# Patient Record
Sex: FEMALE | Race: WHITE | NOT HISPANIC OR LATINO | Employment: OTHER | URBAN - METROPOLITAN AREA
[De-identification: names, ages, dates, MRNs, and addresses within clinical notes are randomized per-mention and may not be internally consistent; named-entity substitution may affect disease eponyms.]

---

## 2017-01-14 ENCOUNTER — MYC MEDICAL ADVICE (OUTPATIENT)
Dept: INTERNAL MEDICINE | Facility: CLINIC | Age: 77
End: 2017-01-14

## 2017-01-14 DIAGNOSIS — N18.30 TYPE 2 DIABETES MELLITUS WITH STAGE 3 CHRONIC KIDNEY DISEASE, WITHOUT LONG-TERM CURRENT USE OF INSULIN (H): Primary | ICD-10-CM

## 2017-01-14 DIAGNOSIS — E11.22 TYPE 2 DIABETES MELLITUS WITH STAGE 3 CHRONIC KIDNEY DISEASE, WITHOUT LONG-TERM CURRENT USE OF INSULIN (H): Primary | ICD-10-CM

## 2017-01-14 DIAGNOSIS — E78.5 HYPERLIPIDEMIA LDL GOAL <70: ICD-10-CM

## 2017-01-24 DIAGNOSIS — I10 ESSENTIAL HYPERTENSION: Primary | ICD-10-CM

## 2017-01-24 RX ORDER — DILTIAZEM HYDROCHLORIDE 360 MG/1
360 CAPSULE, EXTENDED RELEASE ORAL DAILY
Qty: 90 CAPSULE | Refills: 1 | Status: SHIPPED | OUTPATIENT
Start: 2017-01-24 | End: 2017-06-02

## 2017-01-24 NOTE — TELEPHONE ENCOUNTER
diltiazem (CARDIZEM CD) 360 MG 24 hr CD capsule         Last Written Prescription Date: 5/12/2016  Last Fill Quantity: 90, # refills: 3    Last Office Visit with [facility], [facility] or [facility] prescribing provider:  6/14/2016   Future Office Visit:      BP Readings from Last 3 Encounters:   06/14/16 130/68   05/12/16 110/62   03/11/16 140/78     ALT       19   7/26/2016  CHOL      196   7/26/2016  HDL       59   7/26/2016  LDL      105   7/26/2016  LDL      107   4/11/2013  TRIG      158   7/26/2016  CHOLHDLRATIO      4.0   8/3/2015

## 2017-02-20 ENCOUNTER — TELEPHONE (OUTPATIENT)
Dept: INTERNAL MEDICINE | Facility: CLINIC | Age: 77
End: 2017-02-20

## 2017-02-20 NOTE — TELEPHONE ENCOUNTER
Prior authorization    Medication name labetolol  Insurance [facility]  Insurance ID number [id number]  Prior authorization faxed through cover my meds

## 2017-04-27 DIAGNOSIS — J45.30 MILD PERSISTENT ASTHMA WITHOUT COMPLICATION: Primary | ICD-10-CM

## 2017-04-27 NOTE — TELEPHONE ENCOUNTER
Advair Diskus 500-50 mcg/dose diskus inhaler  Last Written Prescription Date: 08/10/2011  Last Fill Quantity: 60, # refills: 11    Last Office Visit with [facility], [facility] or [facility] prescribing provider:  06/14/2016   Future Office Visit:    Next 5 appointments (look out 90 days)     Jun 01, 2017  7:30 AM CDT   [facility] De Jesus [facility]   [facility]    [address] [place] [address] [phone]                   Date of Last Asthma Action Plan Letter:   Asthma Action Plan Q1 Year    Topic Date Due     Asthma Action Plan - yearly  08/10/2016      Asthma Control Test:   ACT Total Scores 5/12/2016   ACT TOTAL SCORE -   ASTHMA ER VISITS -   ASTHMA HOSPITALIZATIONS -   ACT TOTAL SCORE (Goal Greater than or Equal to 20) 18   In the past 12 months, how many times did you visit the emergency room for your asthma without being admitted to the hospital? 0   In the past 12 months, how many times were you hospitalized overnight because of your asthma? 0       Date of Last Spirometry Test:   No results found for this or any previous visit.

## 2017-04-27 NOTE — TELEPHONE ENCOUNTER
Reason for Call:  Medication or medication refill: med refill    Do you use a [facility]?  NO  Name of the pharmacy and phone number for the current request:  [facility] #[id number] [address] - [phone] Fax [phone]    Name of the medication requested:  ADVAIR DISKUS 500-50 MCG/DOSE IN MISC  (inhaler)    Other request: the insurance company doesn't cover the current inhaler, the patient would like to go back onto her old one / advair - please confirm the dosage    Can we leave a detailed message on this number? YES    Phone number patient can be reached at: Home number on file [phone] (home)    Best Time: anytime    Call taken on 4/27/2017 at 9:08 AM by Meredith Granger

## 2017-04-29 DIAGNOSIS — I10 ESSENTIAL HYPERTENSION: ICD-10-CM

## 2017-05-02 RX ORDER — LABETALOL 100 MG/1
TABLET, FILM COATED ORAL
Qty: 180 TABLET | Refills: 0 | Status: SHIPPED | OUTPATIENT
Start: 2017-05-02 | End: 2017-06-02

## 2017-05-02 NOTE — TELEPHONE ENCOUNTER
labetalol (NORMODYNE) 100 MG tablet      Last Written Prescription Date: 5/12/16  Last Fill Quantity: 180, # refills: 3    Last Office Visit with [facility], [facility] or [facility] prescribing provider:  6/14/16   Future Office Visit:    Next 5 appointments (look out 90 days)     Jun 01, 2017  7:30 AM CDT   Office Visit with Jose De Jesus Snow MD   [facility]    [address] [address] [phone]                    BP Readings from Last 3 Encounters:   06/14/16 130/68   05/12/16 110/62   03/11/16 140/78

## 2017-06-02 ENCOUNTER — OFFICE VISIT (OUTPATIENT)
Dept: INTERNAL MEDICINE | Facility: CLINIC | Age: 77
End: 2017-06-02
Payer: COMMERCIAL

## 2017-06-02 VITALS
TEMPERATURE: 98.2 F | SYSTOLIC BLOOD PRESSURE: 102 MMHG | OXYGEN SATURATION: 96 % | HEIGHT: 63 IN | DIASTOLIC BLOOD PRESSURE: 60 MMHG | WEIGHT: 141.7 LBS | BODY MASS INDEX: 25.11 KG/M2 | HEART RATE: 73 BPM

## 2017-06-02 DIAGNOSIS — K21.9 GASTROESOPHAGEAL REFLUX DISEASE WITHOUT ESOPHAGITIS: ICD-10-CM

## 2017-06-02 DIAGNOSIS — N18.30 CKD (CHRONIC KIDNEY DISEASE) STAGE 3, GFR 30-59 ML/MIN (H): ICD-10-CM

## 2017-06-02 DIAGNOSIS — E78.5 HYPERLIPIDEMIA LDL GOAL <70: ICD-10-CM

## 2017-06-02 DIAGNOSIS — I25.10 CORONARY ARTERY DISEASE INVOLVING NATIVE CORONARY ARTERY OF NATIVE HEART WITHOUT ANGINA PECTORIS: ICD-10-CM

## 2017-06-02 DIAGNOSIS — J45.30 MILD PERSISTENT ASTHMA WITHOUT COMPLICATION: ICD-10-CM

## 2017-06-02 DIAGNOSIS — I10 ESSENTIAL HYPERTENSION: ICD-10-CM

## 2017-06-02 DIAGNOSIS — E11.22 TYPE 2 DIABETES MELLITUS WITH STAGE 3 CHRONIC KIDNEY DISEASE, WITHOUT LONG-TERM CURRENT USE OF INSULIN (H): Primary | ICD-10-CM

## 2017-06-02 DIAGNOSIS — K63.5 COLON POLYPS: ICD-10-CM

## 2017-06-02 DIAGNOSIS — N18.30 TYPE 2 DIABETES MELLITUS WITH STAGE 3 CHRONIC KIDNEY DISEASE, WITHOUT LONG-TERM CURRENT USE OF INSULIN (H): Primary | ICD-10-CM

## 2017-06-02 LAB
ALBUMIN SERPL-MCNC: 3.8 G/DL (ref 3.4–5)
ALP SERPL-CCNC: 86 U/L (ref 40–150)
ALT SERPL W P-5'-P-CCNC: 19 U/L (ref 0–50)
ANION GAP SERPL CALCULATED.3IONS-SCNC: 10 MMOL/L (ref 3–14)
AST SERPL W P-5'-P-CCNC: 13 U/L (ref 0–45)
BILIRUB SERPL-MCNC: 0.6 MG/DL (ref 0.2–1.3)
BUN SERPL-MCNC: 20 MG/DL (ref 7–30)
CALCIUM SERPL-MCNC: 8.8 MG/DL (ref 8.5–10.1)
CHLORIDE SERPL-SCNC: 110 MMOL/L (ref 94–109)
CHOLEST SERPL-MCNC: 195 MG/DL
CO2 SERPL-SCNC: 24 MMOL/L (ref 20–32)
CREAT SERPL-MCNC: 1.09 MG/DL (ref 0.52–1.04)
DEPRECATED CALCIDIOL+CALCIFEROL SERPL-MC: 21 UG/L (ref 20–75)
GFR SERPL CREATININE-BSD FRML MDRD: 49 ML/MIN/1.7M2
GLUCOSE SERPL-MCNC: 178 MG/DL (ref 70–99)
HBA1C MFR BLD: 6.7 % (ref 4.3–6)
HDLC SERPL-MCNC: 66 MG/DL
HGB BLD-MCNC: 13.8 G/DL (ref 11.7–15.7)
LDLC SERPL CALC-MCNC: 100 MG/DL
NONHDLC SERPL-MCNC: 129 MG/DL
POTASSIUM SERPL-SCNC: 4 MMOL/L (ref 3.4–5.3)
PROT SERPL-MCNC: 6.8 G/DL (ref 6.8–8.8)
SODIUM SERPL-SCNC: 144 MMOL/L (ref 133–144)
TRIGL SERPL-MCNC: 145 MG/DL

## 2017-06-02 PROCEDURE — 80053 COMPREHEN METABOLIC PANEL: CPT | Performed by: INTERNAL MEDICINE

## 2017-06-02 PROCEDURE — 99215 OFFICE O/P EST HI 40 MIN: CPT | Performed by: INTERNAL MEDICINE

## 2017-06-02 PROCEDURE — 36415 COLL VENOUS BLD VENIPUNCTURE: CPT | Performed by: INTERNAL MEDICINE

## 2017-06-02 PROCEDURE — 83036 HEMOGLOBIN GLYCOSYLATED A1C: CPT | Performed by: INTERNAL MEDICINE

## 2017-06-02 PROCEDURE — 85018 HEMOGLOBIN: CPT | Performed by: INTERNAL MEDICINE

## 2017-06-02 PROCEDURE — 99207 C FOOT EXAM  NO CHARGE: CPT | Performed by: INTERNAL MEDICINE

## 2017-06-02 PROCEDURE — 80061 LIPID PANEL: CPT | Performed by: INTERNAL MEDICINE

## 2017-06-02 PROCEDURE — 82306 VITAMIN D 25 HYDROXY: CPT | Performed by: INTERNAL MEDICINE

## 2017-06-02 RX ORDER — HYDRALAZINE HYDROCHLORIDE 50 MG/1
75 TABLET, FILM COATED ORAL 3 TIMES DAILY
Qty: 405 TABLET | Refills: 3 | Status: SHIPPED | OUTPATIENT
Start: 2017-06-02 | End: 2018-05-07

## 2017-06-02 RX ORDER — LABETALOL 100 MG/1
100 TABLET, FILM COATED ORAL 2 TIMES DAILY
Qty: 180 TABLET | Refills: 3 | Status: SHIPPED | OUTPATIENT
Start: 2017-06-02 | End: 2018-05-07

## 2017-06-02 RX ORDER — LOSARTAN POTASSIUM 100 MG/1
100 TABLET ORAL DAILY
Qty: 90 TABLET | Refills: 3 | Status: SHIPPED | OUTPATIENT
Start: 2017-06-02 | End: 2017-07-24

## 2017-06-02 RX ORDER — OMEPRAZOLE 40 MG/1
40 CAPSULE, DELAYED RELEASE ORAL DAILY
Qty: 90 CAPSULE | Refills: 3 | Status: SHIPPED | OUTPATIENT
Start: 2017-06-02 | End: 2018-05-07

## 2017-06-02 RX ORDER — ALBUTEROL SULFATE 90 UG/1
2 AEROSOL, METERED RESPIRATORY (INHALATION) EVERY 6 HOURS PRN
Qty: 1 INHALER | Refills: 11 | Status: SHIPPED | OUTPATIENT
Start: 2017-06-02 | End: 2018-05-07

## 2017-06-02 RX ORDER — DILTIAZEM HYDROCHLORIDE 360 MG/1
360 CAPSULE, EXTENDED RELEASE ORAL DAILY
Qty: 90 CAPSULE | Refills: 3 | Status: SHIPPED | OUTPATIENT
Start: 2017-06-02 | End: 2018-05-07

## 2017-06-02 RX ORDER — ROSUVASTATIN CALCIUM 40 MG/1
40 TABLET, COATED ORAL DAILY
Qty: 90 TABLET | Refills: 3 | Status: SHIPPED | OUTPATIENT
Start: 2017-06-02 | End: 2018-05-07

## 2017-06-02 NOTE — MR AVS SNAPSHOT
After Visit Summary   6/2/2017    Cristela Peace    MRN: [id number]           Patient Information     Date Of Birth          1940        Visit Information        Provider Department      6/2/2017 8:00 AM Jose De Jesus Snow MD [facility]        Today's Diagnoses     Type 2 diabetes mellitus with stage 3 chronic kidney disease, without long-term current use of insulin (H)    -  1    Essential hypertension        Mild persistent asthma without complication        Hyperlipidemia LDL goal <70        Gastroesophageal reflux disease without esophagitis        Colon polyps        Coronary artery disease involving native coronary artery of native heart without angina pectoris        CKD (chronic kidney disease) stage 3, GFR 30-59 ml/min          Care Instructions    Continue current meds  Prescriptions refilled.    Labs today as ordered  Colonoscopy  with  [facility]. They will call to schedule. If not heard from them in 1 week, then call [phone].   Eye appointment in November  Stress test heart - [facility] will call to schedule  Td vaccine (tetanus) this summer. Check with insurance if prefers to get in pharmacy or clinic                 Follow-ups after your visit        Additional Services     GASTROENTEROLOGY ADULT REF PROCEDURE ONLY                 Future tests that were ordered for you today     Open Future Orders        Priority Expected Expires Ordered    NM Exercise stress test Routine  6/2/2018 6/2/2017            Who to contact     If you have questions or need follow up information about today's clinic visit or your schedule please contact [facility] directly at [phone].  Normal or non-critical lab and imaging results will be communicated to you by MyChart, letter or phone within 4 business days after the clinic has received the results. If you do not hear from us within 7 days, please contact the clinic through [employer] or  "phone. If you have a critical or abnormal lab result, we will notify you by phone as soon as possible.  Submit refill requests through [employer] or call your pharmacy and they will forward the refill request to us. Please allow 3 business days for your refill to be completed.          Additional Information About Your Visit        Offsite Care Resourceshart Information     [employer] lets you send messages to your doctor, view your test results, renew your prescriptions, schedule appointments and more. To sign up, go to [email or web address] . Click on \"Log in\" on the left side of the screen, which will take you to the Welcome page. Then click on \"Sign up Now\" on the right side of the page.     You will be asked to enter the access code listed below, as well as some personal information. Please follow the directions to create your username and password.     Your access code is: [email or web address]  Expires: 2017  8:45 AM     Your access code will  in 90 days. If you need help or a new code, please call your [facility] clinic or [phone].        Care EveryWhere ID     This is your Care EveryWhere ID. This could be used by other organizations to access your [facility] medical records  [id number]        Your Vitals Were     Pulse Temperature Height Pulse Oximetry BMI (Body Mass Index)       73 98.2  F (36.8  C) (Oral) 5' 3\" (1.6 m) 96% 25.1 kg/m2        Blood Pressure from Last 3 Encounters:   17 102/60   16 130/68   16 110/62    Weight from Last 3 Encounters:   17 141 lb 11.2 oz (64.3 kg)   16 140 lb (63.5 kg)   16 142 lb (64.4 kg)              We Performed the Following     Albumin Random Urine Quantitative     Comprehensive metabolic panel     FOOT EXAM     GASTROENTEROLOGY ADULT REF PROCEDURE ONLY     Hemoglobin A1c     Hemoglobin     Lipid panel reflex to direct LDL     Vitamin D Deficiency          Today's Medication Changes          These changes are accurate as of: 17  8:45 AM.  If you " have any questions, ask your nurse or doctor.               Start taking these medicines.        Dose/Directions    albuterol 108 (90 BASE) MCG/ACT Inhaler   Commonly known as:  PROAIR HFA/PROVENTIL HFA/VENTOLIN HFA   Used for:  Mild persistent asthma without complication   Started by:  Jose De Jesus Snow MD        Dose:  2 puff   Inhale 2 puffs into the lungs every 6 hours as needed for shortness of breath / dyspnea or wheezing   Quantity:  1 Inhaler   Refills:  11         These medicines have changed or have updated prescriptions.        Dose/Directions    labetalol 100 MG tablet   Commonly known as:  NORMODYNE   This may have changed:  See the new instructions.   Used for:  Essential hypertension   Changed by:  Jose De Jesus Snow MD        Dose:  100 mg   Take 1 tablet (100 mg) by mouth 2 times daily   Quantity:  180 tablet   Refills:  3         Stop taking these medicines if you haven't already. Please contact your care team if you have questions.     mometasone-formoterol 200-5 MCG/ACT oral inhaler   Commonly known as:  DULERA   Stopped by:  Jose De Jesus Snow MD                Where to get your medicines      These medications were sent to [facility] [id number] [place] [facility] [address]  [address] [address]     Phone:  [phone]     albuterol 108 (90 BASE) MCG/ACT Inhaler    diltiazem 360 MG 24 hr CD capsule    fluticasone-salmeterol 500-50 MCG/DOSE diskus inhaler    hydrALAZINE 50 MG tablet    labetalol 100 MG tablet    losartan 100 MG tablet    metFORMIN 1000 MG tablet    omeprazole 40 MG capsule    rosuvastatin 40 MG tablet                Primary Care Provider Office Phone # Fax #    Jose De Jesus Snow [phone]       [facility] [address]  [facility] [address]        Thank you!     Thank you for choosing [facility]  for your care. Our goal is always to provide you with excellent care. Hearing back from our patients is one way we can continue  to improve our services. Please take a few minutes to complete the written survey that you may receive in the mail after your visit with us. Thank you!             Your Updated Medication List - Protect others around you: Learn how to safely use, store and throw away your medicines at www.disposemymeds.org.          This list is accurate as of: 6/2/17  8:45 AM.  Always use your most recent med list.                   Brand Name Dispense Instructions for use    albuterol 108 (90 BASE) MCG/ACT Inhaler    PROAIR HFA/PROVENTIL HFA/VENTOLIN HFA    1 Inhaler    Inhale 2 puffs into the lungs every 6 hours as needed for shortness of breath / dyspnea or wheezing       aspirin 81 MG tablet      Take 1 tablet by mouth daily.       cholecalciferol 2000 UNITS tablet      Take 1 tablet by mouth daily       diltiazem 360 MG 24 hr CD capsule    CARDIZEM CD    90 capsule    Take 1 capsule (360 mg) by mouth daily       fluticasone-salmeterol 500-50 MCG/DOSE diskus inhaler    ADVAIR DISKUS    3 Inhaler    Inhale 1 puff into the lungs every 12 hours       hydrALAZINE 50 MG tablet    APRESOLINE    405 tablet    Take 1.5 tablets (75 mg) by mouth 3 times daily       labetalol 100 MG tablet    NORMODYNE    180 tablet    Take 1 tablet (100 mg) by mouth 2 times daily       losartan 100 MG tablet    COZAAR    90 tablet    Take 1 tablet (100 mg) by mouth daily       metFORMIN 1000 MG tablet    GLUCOPHAGE    180 tablet    Take 1 tablet (1,000 mg) by mouth 2 times daily (with meals)       omeprazole 40 MG capsule    priLOSEC    90 capsule    Take 1 capsule (40 mg) by mouth daily       order for DME     1 Device    Equipment being ordered: nebulizer with tubing       potassium chloride SA 20 MEQ CR tablet    potassium chloride    90 tablet    Take 1 tablet by mouth daily. INDICATION:POTASSIUM SUPPLEMENTATION       rosuvastatin 40 MG tablet    CRESTOR    90 tablet    Take 1 tablet (40 mg) by mouth daily

## 2017-06-02 NOTE — PROGRESS NOTES
SUBJECTIVE:                                                    Cristela Peace is a 76 year old female who presents to clinic today for the following health issues:      Hypertension Follow-up      Outpatient blood pressures are not being checked.    Low Salt Diet: no added salt       Amount of exercise or physical activity: 6-7 days/week for an average of 15-30 minutes    Problems taking medications regularly: No    Medication side effects: none    Diet: regular (no restrictions)    Pt's past medical history, family history, habits, medications and allergies were reviewed with the patient today.  See snap shot for  HCM status. Most recent lab results reviewed with pt. Problem list and histories reviewed & adjusted, as indicated.  Additional history as below:    Also f/u re: diabetes mellitus and lipids, CKD,  Asthma, CAD. Has not been checking sugars at home recently. Last saw cardiology feb 2015. Suggested then that pt have repeat stress test in 2 years.  ACT = 20.  On Advair BID and  Does not have Albuterol at home and requesting. Denies CP, current SOB, abdominal pain, polyuria, polydipsia,  extremity numbness/parasthesias or skin problems. Eye exam Nov 2016.   Has cataract right eye and may need future surgery. Due for colonoscopy with hx polyps. Last 2009.  Using treadmill for 15-20 min 3x/week. ACT = 20        Lab Results   Component Value Date     07/26/2016     Lab Results   Component Value Date    A1C 6.6 07/26/2016     Lab Results   Component Value Date    CHOL 196 07/26/2016     Lab Results   Component Value Date     07/26/2016     Lab Results   Component Value Date    HDL 59 07/26/2016     Lab Results   Component Value Date    TRIG 158 07/26/2016     Lab Results   Component Value Date    CR 1.02 07/26/2016     Lab Results   Component Value Date    ALT 19 07/26/2016     Lab Results   Component Value Date    AST 9 07/26/2016     Lab Results   Component Value Date    MICROL 18 05/12/2016  "    Lab Results   Component Value Date    TSH 3.59 05/12/2016          Additional ROS:   Constitutional, HEENT, Cardiovascular, Pulmonary, GI and , Neuro, MSK and Psych review of systems/symptoms are otherwise negative or unchanged from previous, except as noted above.      OBJECTIVE:  /60 (BP Location: Left arm, Patient Position: Chair, Cuff Size: Adult Regular)  Pulse 73  Temp 98.2  F (36.8  C) (Oral)  Ht 5' 3\" (1.6 m)  Wt 141 lb 11.2 oz (64.3 kg)  SpO2 96%  BMI 25.1 kg/m2   Estimated body mass index is 25.1 kg/(m^2) as calculated from the following:    Height as of this encounter: 5' 3\" (1.6 m).    Weight as of this encounter: 141 lb 11.2 oz (64.3 kg).  Eye: PERRL, EOMI  HENT: ear canals and TM's normal and nose and mouth without ulcers or lesions   Neck: no adenopathy. Thyroid normal to palpation. No bruits  Pulm: Lungs clear to auscultation   CV: Regular rates and rhythm. 1/6 JANSE RUSB (hx sclerosis)  GI: Soft, nontender, Normal active bowel sounds, No hepatosplenomegaly or masses palpable  Ext: Peripheral pulses intact. No edema. Bilateral nontender 1st MTP bunions  Neuro: Normal strength and tone, sensory exam grossly normal      Assessment/Plan: (See plan discussion below for further details)  1. Type 2 diabetes mellitus with stage 3 chronic kidney disease, without long-term current use of insulin (H)   Controlled previously. Not checking sugars recently  - metFORMIN (GLUCOPHAGE) 1000 MG tablet; Take 1 tablet (1,000 mg) by mouth 2 times daily (with meals)  Dispense: 180 tablet; Refill: 3  - FOOT EXAM  - Hemoglobin A1c  - Comprehensive metabolic panel  - Lipid panel reflex to direct LDL  - Albumin Random Urine Quantitative    2. Essential hypertension  controlled  - labetalol (NORMODYNE) 100 MG tablet; Take 1 tablet (100 mg) by mouth 2 times daily  Dispense: 180 tablet; Refill: 3  - diltiazem (CARDIZEM CD) 360 MG 24 hr CD capsule; Take 1 capsule (360 mg) by mouth daily  Dispense: 90 capsule; " Refill: 3  - losartan (COZAAR) 100 MG tablet; Take 1 tablet (100 mg) by mouth daily  Dispense: 90 tablet; Refill: 3  - hydrALAZINE (APRESOLINE) 50 MG tablet; Take 1.5 tablets (75 mg) by mouth 3 times daily  Dispense: 405 tablet; Refill: 3    3. Mild persistent asthma without complication  Controlled. Needs Albuterol to use prn  - fluticasone-salmeterol (ADVAIR DISKUS) 500-50 MCG/DOSE diskus inhaler; Inhale 1 puff into the lungs every 12 hours  Dispense: 3 Inhaler; Refill: 3  - albuterol (PROAIR HFA/PROVENTIL HFA/VENTOLIN HFA) 108 (90 BASE) MCG/ACT Inhaler; Inhale 2 puffs into the lungs every 6 hours as needed for shortness of breath / dyspnea or wheezing  Dispense: 1 Inhaler; Refill: 11    4. Hyperlipidemia LDL goal <70  Continue meds. Labs as ordered  - rosuvastatin (CRESTOR) 40 MG tablet; Take 1 tablet (40 mg) by mouth daily  Dispense: 90 tablet; Refill: 3  - Comprehensive metabolic panel  - Lipid panel reflex to direct LDL    5. Gastroesophageal reflux disease without esophagitis  Controlled.  Needs longterm with hiatal hernia  - omeprazole (PRILOSEC) 40 MG capsule; Take 1 capsule (40 mg) by mouth daily  Dispense: 90 capsule; Refill: 3    6. Colon polyps  Due for f/u colonoscopy with hx polyps  - GASTROENTEROLOGY ADULT REF PROCEDURE ONLY    7. Coronary artery disease involving native coronary artery of native heart without angina pectoris  No recent CP. Needs stress test per recs of cardiology  - NM Exercise stress test; Future    8. CKD (chronic kidney disease) stage 3, GFR 30-59 ml/min  Labs as ordered. HAs been stable previously. Not using NSAIDS  - Comprehensive metabolic panel  - Lipid panel reflex to direct LDL  - Albumin Random Urine Quantitative  - Vitamin D Deficiency  - Hemoglobin    Plan discussion:  Continue current meds  Prescriptions refilled.    Labs today as ordered  Colonoscopy  with  [facility]. They will call to schedule. If not heard from them in 1 week, then call [phone].    Eye appointment in November  Stress test heart - [facility] will call to schedule  Td vaccine (tetanus) this summer. Check with insurance if prefers to get in pharmacy or clinic       Jose De Jesus Snow MD  Internal Medicine Department  [facility]

## 2017-06-02 NOTE — NURSING NOTE
"Chief Complaint   Patient presents with     Hypertension     f/up      Fatigue     x2 months        Initial /60 (BP Location: Left arm, Patient Position: Chair, Cuff Size: Adult Regular)  Pulse 73  Temp 98.2  F (36.8  C) (Oral)  Ht 5' 3\" (1.6 m)  Wt 141 lb 11.2 oz (64.3 kg)  SpO2 96%  BMI 25.1 kg/m2 Estimated body mass index is 25.1 kg/(m^2) as calculated from the following:    Height as of this encounter: 5' 3\" (1.6 m).    Weight as of this encounter: 141 lb 11.2 oz (64.3 kg).  Medication Reconciliation: complete    "

## 2017-06-02 NOTE — PATIENT INSTRUCTIONS
Continue current meds  Prescriptions refilled.    Labs today as ordered  Colonoscopy  with  [facility]. They will call to schedule. If not heard from them in 1 week, then call [phone].   Eye appointment in November  Stress test heart - [facility] will call to schedule  Td vaccine (tetanus) this summer. Check with insurance if prefers to get in pharmacy or clinic

## 2017-06-03 ASSESSMENT — ASTHMA QUESTIONNAIRES: ACT_TOTALSCORE: 20

## 2017-06-10 DIAGNOSIS — E78.5 HYPERLIPIDEMIA LDL GOAL <70: ICD-10-CM

## 2017-06-10 DIAGNOSIS — E11.22 TYPE 2 DIABETES MELLITUS WITH STAGE 3 CHRONIC KIDNEY DISEASE, WITHOUT LONG-TERM CURRENT USE OF INSULIN (H): ICD-10-CM

## 2017-06-10 DIAGNOSIS — N18.30 TYPE 2 DIABETES MELLITUS WITH STAGE 3 CHRONIC KIDNEY DISEASE, WITHOUT LONG-TERM CURRENT USE OF INSULIN (H): ICD-10-CM

## 2017-06-10 DIAGNOSIS — N18.30 CKD (CHRONIC KIDNEY DISEASE) STAGE 3, GFR 30-59 ML/MIN (H): Primary | ICD-10-CM

## 2017-06-13 ENCOUNTER — HOSPITAL ENCOUNTER (OUTPATIENT)
Age: 77
End: 2017-06-13
Attending: INTERNAL MEDICINE | Admitting: INTERNAL MEDICINE
Payer: MEDICARE

## 2017-06-24 ENCOUNTER — HEALTH MAINTENANCE LETTER (OUTPATIENT)
Age: 77
End: 2017-06-24

## 2017-06-28 ENCOUNTER — TELEPHONE (OUTPATIENT)
Dept: INTERNAL MEDICINE | Facility: CLINIC | Age: 77
End: 2017-06-28

## 2017-06-28 NOTE — TELEPHONE ENCOUNTER
Hold DIltiazem, Metformin and Labetolol that AM of the test. OK to take other meds.  Avoid caffeine for 24 hrs before the test and would not eat for 4 hours prior to the test

## 2017-07-10 ENCOUNTER — HOSPITAL ENCOUNTER (OUTPATIENT)
Dept: NUCLEAR MEDICINE | Facility: CLINIC | Age: 77
Setting detail: NUCLEAR MEDICINE
End: 2017-07-10
Attending: INTERNAL MEDICINE
Payer: MEDICARE

## 2017-07-10 ENCOUNTER — HOSPITAL ENCOUNTER (OUTPATIENT)
Dept: NUCLEAR MEDICINE | Facility: CLINIC | Age: 77
Setting detail: NUCLEAR MEDICINE
Discharge: HOME | End: 2017-07-10
Attending: INTERNAL MEDICINE | Admitting: INTERNAL MEDICINE
Payer: MEDICARE

## 2017-07-10 ENCOUNTER — HOSPITAL ENCOUNTER (OUTPATIENT)
Dept: CARDIOLOGY | Facility: CLINIC | Age: 77
End: 2017-07-10
Attending: INTERNAL MEDICINE
Payer: MEDICARE

## 2017-07-10 DIAGNOSIS — I25.10 CORONARY ARTERY DISEASE INVOLVING NATIVE CORONARY ARTERY OF NATIVE HEART WITHOUT ANGINA PECTORIS: ICD-10-CM

## 2017-07-10 PROCEDURE — 93018 CV STRESS TEST I&R ONLY: CPT | Performed by: INTERNAL MEDICINE

## 2017-07-10 PROCEDURE — 78452 HT MUSCLE IMAGE SPECT MULT: CPT

## 2017-07-10 PROCEDURE — A9502 TC99M TETROFOSMIN: HCPCS | Performed by: INTERNAL MEDICINE

## 2017-07-10 PROCEDURE — 93016 CV STRESS TEST SUPVJ ONLY: CPT | Performed by: INTERNAL MEDICINE

## 2017-07-10 PROCEDURE — 93017 CV STRESS TEST TRACING ONLY: CPT

## 2017-07-10 PROCEDURE — 34300033 ZZH RX 343: Performed by: INTERNAL MEDICINE

## 2017-07-10 PROCEDURE — 78452 HT MUSCLE IMAGE SPECT MULT: CPT | Mod: 26 | Performed by: INTERNAL MEDICINE

## 2017-07-10 RX ADMIN — TETROFOSMIN 30.5 MCI.: 1.38 INJECTION, POWDER, LYOPHILIZED, FOR SOLUTION INTRAVENOUS at 09:36

## 2017-07-10 RX ADMIN — TETROFOSMIN 9.6 MCI.: 1.38 INJECTION, POWDER, LYOPHILIZED, FOR SOLUTION INTRAVENOUS at 08:10

## 2017-07-11 DIAGNOSIS — E78.5 HYPERLIPIDEMIA LDL GOAL <70: ICD-10-CM

## 2017-07-11 RX ORDER — ROSUVASTATIN CALCIUM 40 MG/1
TABLET, COATED ORAL
Qty: 30 TABLET | Refills: 9 | OUTPATIENT
Start: 2017-07-11

## 2017-07-24 DIAGNOSIS — I10 ESSENTIAL HYPERTENSION: ICD-10-CM

## 2017-07-25 ENCOUNTER — TRANSFERRED RECORDS (OUTPATIENT)
Dept: HEALTH INFORMATION MANAGEMENT | Facility: CLINIC | Age: 77
End: 2017-07-25

## 2017-07-25 RX ORDER — LOSARTAN POTASSIUM 100 MG/1
TABLET ORAL
Qty: 90 TABLET | Refills: 3 | Status: SHIPPED | OUTPATIENT
Start: 2017-07-25 | End: 2018-05-07

## 2017-08-19 ENCOUNTER — HEALTH MAINTENANCE LETTER (OUTPATIENT)
Age: 77
End: 2017-08-19

## 2018-02-27 DIAGNOSIS — J45.30 MILD PERSISTENT ASTHMA WITHOUT COMPLICATION: ICD-10-CM

## 2018-02-27 NOTE — TELEPHONE ENCOUNTER
ACT Total Scores 8/10/2015 5/12/2016 6/2/2017   ACT TOTAL SCORE 22 - -   ASTHMA ER VISITS 0 = None - -   ASTHMA HOSPITALIZATIONS 0 = None - -   ACT TOTAL SCORE (Goal Greater than or Equal to 20) - 18 20   In the past 12 months, how many times did you visit the emergency room for your asthma without being admitted to the hospital? - 0 0   In the past 12 months, how many times were you hospitalized overnight because of your asthma? - 0 0     Medication is being filled for 1 time refill only due to:  due for office visit

## 2018-02-27 NOTE — TELEPHONE ENCOUNTER
"Requested Prescriptions   Pending Prescriptions Disp Refills     fluticasone-salmeterol (ADVAIR DISKUS) 500-50 MCG/DOSE diskus inhaler  Last Written Prescription Date:  6/2/17  Last Fill Quantity: 3,  # refills: 3   Last office visit: 6/2/2017 with prescribing provider:  6/27/17   Future Office Visit:   Next 5 appointments (look out 90 days)     Apr 03, 2018  8:00 AM CDT   [facility]    [address] [address] [phone]                3 Inhaler 3     Sig: Inhale 1 puff into the lungs every 12 hours    Inhaled Steroids Protocol Failed    2/27/2018  9:09 AM       Failed - Asthma control test 20 or greater in last 6 months    Please review ACT score.          Failed - Recent (6 mo) or future visit with authorizing provider's specialty    Patient had office visit in the last 6 months or has a visit in the next 30 days with authorizing provider.  See \"Patient Info\" tab in inbasket, or \"Choose Columns\" in Meds & Orders section of the refill encounter.           Passed - Patient is age 12 or older        "

## 2018-02-27 NOTE — LETTER
[facility]  [address] [id number] [phone]            Cristela Salgado  [address] [address] [facility] [address]        February 27, 2018    Dear Cristela,    While refilling your prescription today, we noticed that you are due for an appointment with your provider.  We will refill your prescription for 30 days, but a follow-up appointment must be made before any additional refills can be approved.     Taking care of your health is important to us and we look forward to seeing you in the near future.  Please call us at [phone] or [phone] (or use [employer]) to schedule an appointment.     Please disregard this notice if you have already made an appointment.    Sincerely,        [facility]

## 2018-03-27 DIAGNOSIS — J45.30 MILD PERSISTENT ASTHMA WITHOUT COMPLICATION: ICD-10-CM

## 2018-03-27 NOTE — TELEPHONE ENCOUNTER
"Requested Prescriptions   Pending Prescriptions Disp Refills     ADVAIR DISKUS 500-50 MCG/DOSE diskus inhaler [Pharmacy Med Name: ADVAIR 500-50 DISKUS]  0     Sig: INHALE 1 PUFF INTO THE LUNGS EVERY 12 HOURS    Inhaled Steroids Protocol Failed    3/27/2018  1:47 AM       Failed - Asthma control assessment score within normal limits in last 6 months    Please review ACT score.          Passed - Patient is age 12 or older       Passed - Recent (6 mo) or future (30 days) visit within the authorizing provider's specialty    Patient had office visit in the last 6 months or has a visit in the next 30 days with authorizing provider or within the authorizing provider's specialty.  See \"Patient Info\" tab in inbasket, or \"Choose Columns\" in Meds & Orders section of the refill encounter.            ACT Total Scores 8/10/2015 5/12/2016 6/2/2017   ACT TOTAL SCORE 22 - -   ASTHMA ER VISITS 0 = None - -   ASTHMA HOSPITALIZATIONS 0 = None - -   ACT TOTAL SCORE (Goal Greater than or Equal to 20) - 18 20   In the past 12 months, how many times did you visit the emergency room for your asthma without being admitted to the hospital? - 0 0   In the past 12 months, how many times were you hospitalized overnight because of your asthma? - 0 0     Prescription [facility] per [facility] to the [facility] Refill Protocol.  Upcoming appt  "

## 2018-04-03 ENCOUNTER — OFFICE VISIT (OUTPATIENT)
Dept: INTERNAL MEDICINE | Facility: CLINIC | Age: 78
End: 2018-04-03
Payer: COMMERCIAL

## 2018-04-03 VITALS
RESPIRATION RATE: 16 BRPM | BODY MASS INDEX: 25.68 KG/M2 | WEIGHT: 144.9 LBS | DIASTOLIC BLOOD PRESSURE: 90 MMHG | SYSTOLIC BLOOD PRESSURE: 154 MMHG | HEIGHT: 63 IN | HEART RATE: 82 BPM | TEMPERATURE: 98.3 F

## 2018-04-03 DIAGNOSIS — J45.30 MILD PERSISTENT ASTHMA WITHOUT COMPLICATION: ICD-10-CM

## 2018-04-03 DIAGNOSIS — Z12.31 VISIT FOR SCREENING MAMMOGRAM: ICD-10-CM

## 2018-04-03 DIAGNOSIS — N18.30 TYPE 2 DIABETES MELLITUS WITH STAGE 3 CHRONIC KIDNEY DISEASE, WITHOUT LONG-TERM CURRENT USE OF INSULIN (H): ICD-10-CM

## 2018-04-03 DIAGNOSIS — E11.22 TYPE 2 DIABETES MELLITUS WITH STAGE 3 CHRONIC KIDNEY DISEASE, WITHOUT LONG-TERM CURRENT USE OF INSULIN (H): ICD-10-CM

## 2018-04-03 DIAGNOSIS — N91.2 ABSENCE OF MENSTRUATION: ICD-10-CM

## 2018-04-03 DIAGNOSIS — I10 ESSENTIAL HYPERTENSION: ICD-10-CM

## 2018-04-03 DIAGNOSIS — Z23 NEED FOR PROPHYLACTIC VACCINATION WITH TETANUS-DIPHTHERIA (TD): ICD-10-CM

## 2018-04-03 PROCEDURE — 90471 IMMUNIZATION ADMIN: CPT | Performed by: INTERNAL MEDICINE

## 2018-04-03 PROCEDURE — 90714 TD VACC NO PRESV 7 YRS+ IM: CPT | Performed by: INTERNAL MEDICINE

## 2018-04-03 PROCEDURE — 99214 OFFICE O/P EST MOD 30 MIN: CPT | Mod: 25 | Performed by: INTERNAL MEDICINE

## 2018-04-03 NOTE — PROGRESS NOTES
SUBJECTIVE:   Cristela Salgado is a 77 year old female who presents to clinic today for the following health issues:      Diabetes Follow-up      Patient is checking blood sugars: not at all    Diabetic concerns: None     Symptoms of hypoglycemia (low blood sugar): none     Paresthesias (numbness or burning in feet) or sores: No     Date of last diabetic eye exam: 6/2/17    BP Readings from Last 2 Encounters:   06/02/17 102/60   06/14/16 130/68     Hemoglobin A1C (%)   Date Value   06/02/2017 6.7 (H)   07/26/2016 6.6 (H)     LDL Cholesterol Calculated (mg/dL)   Date Value   06/02/2017 100 (H)   07/26/2016 105 (H)     Hypertension Follow-up      Outpatient blood pressures are being checked at home.  Results are 110-130/70-90.    Low Salt Diet: no added salt      Amount of exercise or physical activity: None    Problems taking medications regularly: No    Medication side effects: none    Diet: regular (no restrictions)    Pt's past medical history, family history, habits, medications and allergies were reviewed with the patient today.  See snap shot for  HCM status. Most recent lab results reviewed with pt. Problem list and histories reviewed & adjusted, as indicated.  Additional history as below:    Denies CP,  abdominal pain, polyuria, polydipsia, vision changes, extremity numbness/parasthesias or skin problems. ACT = 20. Occ use of Albuterol.  Blood pressure elevated today.  Had been well controlled at last visit 10 months ago.  Weight is up 3 pounds since then.  Patient states she is compliant with taking medications though did not take her blood pressure medication this morning.  Has not been checking blood sugars at all.  Diabetes previously well controlled.  Following diabetic diet.    Lab Results   Component Value Date     06/02/2017     Lab Results   Component Value Date    A1C 6.7 06/02/2017     Lab Results   Component Value Date    CHOL 195 06/02/2017     Lab Results   Component Value Date    LDL  "100 06/02/2017     Lab Results   Component Value Date    HDL 66 06/02/2017     Lab Results   Component Value Date    TRIG 145 06/02/2017     Lab Results   Component Value Date    CR 1.09 06/02/2017     Lab Results   Component Value Date    ALT 19 06/02/2017     Lab Results   Component Value Date    AST 13 06/02/2017     Lab Results   Component Value Date    MICROL 18 05/12/2016     Lab Results   Component Value Date    TSH 3.59 05/12/2016        Additional ROS:   Constitutional, HEENT, Cardiovascular, Pulmonary, GI and , Neuro, MSK and Psych review of systems/symptoms are otherwise negative or unchanged from previous, except as noted above.      OBJECTIVE:  /90  Pulse 82  Temp 98.3  F (36.8  C) (Oral)  Resp 16  Ht 5' 3\" (1.6 m)  Wt 144 lb 14.4 oz (65.7 kg)  BMI 25.67 kg/m2   Estimated body mass index is 25.67 kg/(m^2) as calculated from the following:    Height as of this encounter: 5' 3\" (1.6 m).    Weight as of this encounter: 144 lb 14.4 oz (65.7 kg).  Eye: PERRL, EOMI  HENT: ear canals and TM's normal and nose and mouth without ulcers or lesions   Neck: no adenopathy. Thyroid normal to palpation. No bruits  Pulm: Lungs clear to auscultation   CV: Regular rates and rhythm  GI: Soft, nontender, Normal active bowel sounds, No hepatosplenomegaly or masses palpable  Ext: Peripheral pulses intact. No edema.  Neuro: Normal strength and tone, sensory exam grossly normal    Assessment/Plan: (See plan discussion below for further details)  1. Type 2 diabetes mellitus with stage 3 chronic kidney disease, without long-term current use of insulin (H)  Overdue for labs.  Previously controlled.  No recent blood sugars to review.  Await future labs as ordered in the next few weeks  - Comprehensive metabolic panel; Future    2. Essential hypertension  Currently uncontrolled.  Patient not monitoring blood pressures at home.  Did not take meds today.  Will recheck in 1 month and adjust medications as necessary to " goal blood pressure less than 130/80    3. Visit for screening mammogram  Due for mammogram screening  - MA SCREENING DIGITAL BILAT - Future  (s+30); Future    4. Need for prophylactic vaccination with tetanus-diphtheria (TD)  - TD PRESERV FREE >=7 YRS ADS IM    5. Absence of menstruation  Due for bone density screening.  Last normal in 2010  - DX Hip/Pelvis/Spine; Future    6. Mild persistent asthma without complication  Continue current medication. ACT at goal.     Plan discussion:   TD vaccine  Fasting labs in the next 2-4 weeks. For fasting labs, please refrain from eating for 8 hours or more.  Be sure to  drink water and take your  medications the day of the test.   See me in 1 month for follow-up of sugars and blood pressure. Check blood sugars twice a day (before breakfast and bedtime) for 4 days prior to the appointment with me and bring the results to the appointment.  Mammogram and Bone density test - [facility]  Continue current meds       Jose De Jesus Snow MD  Internal Medicine Department  [facility]

## 2018-04-03 NOTE — MR AVS SNAPSHOT
After Visit Summary   4/3/2018    Cristela Salgado    MRN: [id number]           Patient Information     Date Of Birth          1940        Visit Information        Provider Department      4/3/2018 8:00 AM Jose De Jesus Snow MD [facility]        Today's Diagnoses     Absence of menstruation    -  1    Visit for screening mammogram        Need for prophylactic vaccination with tetanus-diphtheria (TD)        Type 2 diabetes mellitus with stage 3 chronic kidney disease, without long-term current use of insulin (H)          Care Instructions     TD vaccine  Fasting labs in the next 2-4 weeks. For fasting labs, please refrain from eating for 8 hours or more.  Be sure to  drink water and take your  medications the day of the test.   See me in 1 month for follow-up of sugars and blood pressure. Check blood sugars twice a day (before breakfast and bedtime) for 4 days prior to the appointment with me and bring the results to the appointment.  Mammogram and Bone density test - [facility]  Continue current meds          Follow-ups after your visit        Future tests that were ordered for you today     Open Future Orders        Priority Expected Expires Ordered    Comprehensive metabolic panel Routine 4/10/2018 4/3/2019 4/3/2018    DX Hip/Pelvis/Spine Routine  4/3/2019 4/3/2018    MA SCREENING DIGITAL BILAT - Future  (s+30) Routine  4/3/2019 4/3/2018            Who to contact     If you have questions or need follow up information about today's clinic visit or your schedule please contact [facility] directly at [phone].  Normal or non-critical lab and imaging results will be communicated to you by MyChart, letter or phone within 4 business days after the clinic has received the results. If you do not hear from us within 7 days, please contact the clinic through MyChart or phone. If you have a critical or abnormal lab result, we will notify you by phone as soon  "as possible.  Submit refill requests through [employer] or call your pharmacy and they will forward the refill request to us. Please allow 3 business days for your refill to be completed.          Additional Information About Your Visit        [employer] Information     [employer] lets you send messages to your doctor, view your test results, renew your prescriptions, schedule appointments and more. To sign up, go to [email or web address] . Click on \"Log in\" on the left side of the screen, which will take you to the Welcome page. Then click on \"Sign up Now\" on the right side of the page.     You will be asked to enter the access code listed below, as well as some personal information. Please follow the directions to create your username and password.     Your access code is: [email or web address]  Expires: 2018  8:38 AM     Your access code will  in 90 days. If you need help or a new code, please call your [place] clinic or [phone].        Care EveryWhere ID     This is your Care EveryWhere ID. This could be used by other organizations to access your [place] medical records  [id number]        Your Vitals Were     Pulse Temperature Respirations Height BMI (Body Mass Index)       82 98.3  F (36.8  C) (Oral) 16 5' 3\" (1.6 m) 25.67 kg/m2        Blood Pressure from Last 3 Encounters:   18 154/90   17 102/60   16 130/68    Weight from Last 3 Encounters:   18 144 lb 14.4 oz (65.7 kg)   17 141 lb 11.2 oz (64.3 kg)   16 140 lb (63.5 kg)                 Today's Medication Changes          These changes are accurate as of 4/3/18  8:38 AM.  If you have any questions, ask your nurse or doctor.               Stop taking these medicines if you haven't already. Please contact your care team if you have questions.     order for DME                    Primary Care Provider Office Phone # Fax #    Jose De Jesus Snow [phone]       [address] [place] [id number]        Equal " Access to Services     [facility] : Hadii duy sher alexa Garcia, waaxda luqadaha, qaybta kaalmagaby prietoamymando de leon. So [facility] [phone].    ATENCIÓN: Si habla español, tiene a castro disposición servicios gratuitos de asistencia lingüística. Llame al [phone].    We comply with applicable federal civil rights laws and [place] laws. We do not discriminate on the basis of race, color, national origin, age, disability, sex, sexual orientation, or gender identity.            Thank you!     Thank you for choosing [facility]  for your care. Our goal is always to provide you with excellent care. Hearing back from our patients is one way we can continue to improve our services. Please take a few minutes to complete the written survey that you may receive in the mail after your visit with us. Thank you!             Your Updated Medication List - Protect others around you: Learn how to safely use, store and throw away your medicines at www.disposemymeds.org.          This list is accurate as of 4/3/18  8:38 AM.  Always use your most recent med list.                   Brand Name Dispense Instructions for use Diagnosis    ADVAIR DISKUS 500-50 MCG/DOSE diskus inhaler   Generic drug:  fluticasone-salmeterol     1 Inhaler    INHALE 1 PUFF INTO THE LUNGS EVERY 12 HOURS    Mild persistent asthma without complication       albuterol 108 (90 BASE) MCG/ACT Inhaler    PROAIR HFA/PROVENTIL HFA/VENTOLIN HFA    1 Inhaler    Inhale 2 puffs into the lungs every 6 hours as needed for shortness of breath / dyspnea or wheezing    Mild persistent asthma without complication       aspirin 81 MG tablet      Take 1 tablet by mouth daily.        cholecalciferol 2000 UNITS tablet      Take 1 tablet by mouth daily        diltiazem 360 MG 24 hr CD capsule    CARDIZEM CD    90 capsule    Take 1 capsule (360 mg) by mouth daily    Essential hypertension       hydrALAZINE 50 MG tablet     APRESOLINE    405 tablet    Take 1.5 tablets (75 mg) by mouth 3 times daily    Essential hypertension       labetalol 100 MG tablet    NORMODYNE    180 tablet    Take 1 tablet (100 mg) by mouth 2 times daily    Essential hypertension       losartan 100 MG tablet    COZAAR    90 tablet    TAKE ONE TABLET BY MOUTH ONE TIME DAILY    Essential hypertension       metFORMIN 1000 MG tablet    GLUCOPHAGE    180 tablet    Take 1 tablet (1,000 mg) by mouth 2 times daily (with meals)    Type 2 diabetes mellitus with stage 3 chronic kidney disease, without long-term current use of insulin (H)       omeprazole 40 MG capsule    priLOSEC    90 capsule    Take 1 capsule (40 mg) by mouth daily    Gastroesophageal reflux disease without esophagitis       potassium chloride SA 20 MEQ CR tablet    KLOR-CON    90 tablet    Take 1 tablet by mouth daily. INDICATION:POTASSIUM SUPPLEMENTATION    Hypokalemia       rosuvastatin 40 MG tablet    CRESTOR    90 tablet    Take 1 tablet (40 mg) by mouth daily    Hyperlipidemia LDL goal <70

## 2018-04-03 NOTE — PATIENT INSTRUCTIONS
TD vaccine  Fasting labs in the next 2-4 weeks. For fasting labs, please refrain from eating for 8 hours or more.  Be sure to  drink water and take your  medications the day of the test.   See me in 1 month for follow-up of sugars and blood pressure. Check blood sugars twice a day (before breakfast and bedtime) for 4 days prior to the appointment with me and bring the results to the appointment.  Mammogram and Bone density test - [place]  Continue current meds

## 2018-04-04 ASSESSMENT — ASTHMA QUESTIONNAIRES: ACT_TOTALSCORE: 20

## 2018-05-02 ENCOUNTER — RADIANT APPOINTMENT (OUTPATIENT)
Dept: MAMMOGRAPHY | Facility: CLINIC | Age: 78
End: 2018-05-02
Attending: INTERNAL MEDICINE
Payer: COMMERCIAL

## 2018-05-02 DIAGNOSIS — Z12.31 VISIT FOR SCREENING MAMMOGRAM: ICD-10-CM

## 2018-05-02 DIAGNOSIS — E11.22 TYPE 2 DIABETES MELLITUS WITH STAGE 3 CHRONIC KIDNEY DISEASE, WITHOUT LONG-TERM CURRENT USE OF INSULIN (H): ICD-10-CM

## 2018-05-02 DIAGNOSIS — E78.5 HYPERLIPIDEMIA LDL GOAL <70: ICD-10-CM

## 2018-05-02 DIAGNOSIS — N18.30 CKD (CHRONIC KIDNEY DISEASE) STAGE 3, GFR 30-59 ML/MIN (H): ICD-10-CM

## 2018-05-02 DIAGNOSIS — N18.30 TYPE 2 DIABETES MELLITUS WITH STAGE 3 CHRONIC KIDNEY DISEASE, WITHOUT LONG-TERM CURRENT USE OF INSULIN (H): ICD-10-CM

## 2018-05-02 LAB
ALBUMIN SERPL-MCNC: 3.6 G/DL (ref 3.4–5)
ALP SERPL-CCNC: 73 U/L (ref 40–150)
ALT SERPL W P-5'-P-CCNC: 22 U/L (ref 0–50)
ANION GAP SERPL CALCULATED.3IONS-SCNC: 12 MMOL/L (ref 3–14)
AST SERPL W P-5'-P-CCNC: 10 U/L (ref 0–45)
BILIRUB SERPL-MCNC: 0.6 MG/DL (ref 0.2–1.3)
BUN SERPL-MCNC: 17 MG/DL (ref 7–30)
CALCIUM SERPL-MCNC: 8.9 MG/DL (ref 8.5–10.1)
CHLORIDE SERPL-SCNC: 109 MMOL/L (ref 94–109)
CHOLEST SERPL-MCNC: 191 MG/DL
CO2 SERPL-SCNC: 22 MMOL/L (ref 20–32)
CREAT SERPL-MCNC: 1.02 MG/DL (ref 0.52–1.04)
CREAT UR-MCNC: 202 MG/DL
GFR SERPL CREATININE-BSD FRML MDRD: 53 ML/MIN/1.7M2
GLUCOSE SERPL-MCNC: 179 MG/DL (ref 70–99)
HBA1C MFR BLD: 7.2 % (ref 0–5.6)
HDLC SERPL-MCNC: 55 MG/DL
LDLC SERPL CALC-MCNC: 107 MG/DL
MICROALBUMIN UR-MCNC: 26 MG/L
MICROALBUMIN/CREAT UR: 12.67 MG/G CR (ref 0–25)
NONHDLC SERPL-MCNC: 136 MG/DL
POTASSIUM SERPL-SCNC: 3.9 MMOL/L (ref 3.4–5.3)
PROT SERPL-MCNC: 6.6 G/DL (ref 6.8–8.8)
SODIUM SERPL-SCNC: 143 MMOL/L (ref 133–144)
TRIGL SERPL-MCNC: 146 MG/DL
TSH SERPL DL<=0.005 MIU/L-ACNC: 3.16 MU/L (ref 0.4–4)

## 2018-05-02 PROCEDURE — 36415 COLL VENOUS BLD VENIPUNCTURE: CPT | Performed by: INTERNAL MEDICINE

## 2018-05-02 PROCEDURE — 84443 ASSAY THYROID STIM HORMONE: CPT | Performed by: INTERNAL MEDICINE

## 2018-05-02 PROCEDURE — 83036 HEMOGLOBIN GLYCOSYLATED A1C: CPT | Performed by: INTERNAL MEDICINE

## 2018-05-02 PROCEDURE — 82043 UR ALBUMIN QUANTITATIVE: CPT | Performed by: INTERNAL MEDICINE

## 2018-05-02 PROCEDURE — 77067 SCR MAMMO BI INCL CAD: CPT | Mod: TC

## 2018-05-02 PROCEDURE — 80061 LIPID PANEL: CPT | Performed by: INTERNAL MEDICINE

## 2018-05-02 PROCEDURE — 80053 COMPREHEN METABOLIC PANEL: CPT | Performed by: INTERNAL MEDICINE

## 2018-05-07 ENCOUNTER — OFFICE VISIT (OUTPATIENT)
Dept: INTERNAL MEDICINE | Facility: CLINIC | Age: 78
End: 2018-05-07
Payer: COMMERCIAL

## 2018-05-07 VITALS
SYSTOLIC BLOOD PRESSURE: 136 MMHG | HEART RATE: 72 BPM | BODY MASS INDEX: 25.33 KG/M2 | DIASTOLIC BLOOD PRESSURE: 66 MMHG | TEMPERATURE: 98.6 F | WEIGHT: 143 LBS | RESPIRATION RATE: 16 BRPM

## 2018-05-07 DIAGNOSIS — K21.9 GASTROESOPHAGEAL REFLUX DISEASE WITHOUT ESOPHAGITIS: ICD-10-CM

## 2018-05-07 DIAGNOSIS — E11.22 TYPE 2 DIABETES MELLITUS WITH STAGE 3 CHRONIC KIDNEY DISEASE, WITHOUT LONG-TERM CURRENT USE OF INSULIN (H): ICD-10-CM

## 2018-05-07 DIAGNOSIS — E78.5 HYPERLIPIDEMIA LDL GOAL <70: ICD-10-CM

## 2018-05-07 DIAGNOSIS — Z13.820 SCREENING FOR OSTEOPOROSIS: ICD-10-CM

## 2018-05-07 DIAGNOSIS — J45.30 MILD PERSISTENT ASTHMA WITHOUT COMPLICATION: ICD-10-CM

## 2018-05-07 DIAGNOSIS — N18.30 TYPE 2 DIABETES MELLITUS WITH STAGE 3 CHRONIC KIDNEY DISEASE, WITHOUT LONG-TERM CURRENT USE OF INSULIN (H): ICD-10-CM

## 2018-05-07 DIAGNOSIS — I10 ESSENTIAL HYPERTENSION: ICD-10-CM

## 2018-05-07 PROCEDURE — 99214 OFFICE O/P EST MOD 30 MIN: CPT | Performed by: INTERNAL MEDICINE

## 2018-05-07 RX ORDER — ROSUVASTATIN CALCIUM 40 MG/1
40 TABLET, COATED ORAL DAILY
Qty: 90 TABLET | Refills: 3 | Status: SHIPPED | OUTPATIENT
Start: 2018-05-07 | End: 2019-04-16

## 2018-05-07 RX ORDER — HYDRALAZINE HYDROCHLORIDE 50 MG/1
75 TABLET, FILM COATED ORAL 3 TIMES DAILY
Qty: 405 TABLET | Refills: 3 | Status: SHIPPED | OUTPATIENT
Start: 2018-05-07 | End: 2019-04-16

## 2018-05-07 RX ORDER — OMEPRAZOLE 40 MG/1
40 CAPSULE, DELAYED RELEASE ORAL DAILY
Qty: 90 CAPSULE | Refills: 3 | Status: SHIPPED | OUTPATIENT
Start: 2018-05-07 | End: 2019-04-16

## 2018-05-07 RX ORDER — LOSARTAN POTASSIUM 100 MG/1
100 TABLET ORAL DAILY
Qty: 90 TABLET | Refills: 3 | Status: SHIPPED | OUTPATIENT
Start: 2018-05-07 | End: 2019-04-16

## 2018-05-07 RX ORDER — DILTIAZEM HYDROCHLORIDE 360 MG/1
360 CAPSULE, EXTENDED RELEASE ORAL DAILY
Qty: 90 CAPSULE | Refills: 3 | Status: SHIPPED | OUTPATIENT
Start: 2018-05-07 | End: 2019-04-16

## 2018-05-07 RX ORDER — LABETALOL 100 MG/1
100 TABLET, FILM COATED ORAL 2 TIMES DAILY
Qty: 180 TABLET | Refills: 3 | Status: SHIPPED | OUTPATIENT
Start: 2018-05-07 | End: 2019-04-16

## 2018-05-07 RX ORDER — EZETIMIBE 10 MG/1
10 TABLET ORAL DAILY
Qty: 30 TABLET | Refills: 1 | Status: SHIPPED | OUTPATIENT
Start: 2018-05-07 | End: 2019-04-16

## 2018-05-07 RX ORDER — ALBUTEROL SULFATE 90 UG/1
2 AEROSOL, METERED RESPIRATORY (INHALATION) EVERY 6 HOURS PRN
Qty: 1 INHALER | Refills: 11 | Status: SHIPPED | OUTPATIENT
Start: 2018-05-07 | End: 2019-04-16

## 2018-05-07 ASSESSMENT — PAIN SCALES - GENERAL: PAINLEVEL: NO PAIN (0)

## 2018-05-07 NOTE — PROGRESS NOTES
SUBJECTIVE:   Cristela Salgado is a 77 year old female who presents to clinic today for the following health issues:  Chief Complaint   Patient presents with     Diabetes     Hypertension     Hyperlipidemia     Diabetes Follow-up      Patient is checking blood sugars: rarely.  Results range from 110's to 130's in AM only    Diabetic concerns: None     Symptoms of hypoglycemia (low blood sugar): none     Paresthesias (numbness or burning in feet) or sores: No     Date of last diabetic eye exam: 06/2/2018    Hyperlipidemia Follow-Up      Rate your low fat/cholesterol diet?: fair    Taking statin?  Yes, no muscle aches, at times from statin    Other lipid medications/supplements?:  none    Hypertension Follow-up      Outpatient blood pressures are not being checked.    Low Salt Diet: no added salt        Amount of exercise or physical activity: 2-3 days/week for an average of 30-45 minutes    Problems taking medications regularly: No    Medication side effects: none    Diet: regular (no restrictions) and no salt      Pt's past medical history, family history, habits, medications and allergies were reviewed with the patient today.  See snap shot for  HCM status. Most recent lab results reviewed with pt. Problem list and histories reviewed & adjusted, as indicated.  Additional history as below:    Sugars as above. Only checking in AM. Denies CP, SOB, abdominal pain, polyuria, polydipsia, vision changes, extremity numbness/parasthesias or skin problems.  Consistent with Advair use. No cough.  Hx GERD when off of PPI. No sx when taking med daily       Lab Results   Component Value Date     05/02/2018     Lab Results   Component Value Date    A1C 7.2 05/02/2018     Lab Results   Component Value Date    CHOL 191 05/02/2018     Lab Results   Component Value Date     05/02/2018     Lab Results   Component Value Date    HDL 55 05/02/2018     Lab Results   Component Value Date    TRIG 146 05/02/2018     Lab  "Results   Component Value Date    CR 1.02 05/02/2018     Lab Results   Component Value Date    ALT 22 05/02/2018     Lab Results   Component Value Date    AST 10 05/02/2018     Lab Results   Component Value Date    MICROL 26 05/02/2018     Lab Results   Component Value Date    TSH 3.16 05/02/2018         Additional ROS:   Constitutional, HEENT, Cardiovascular, Pulmonary, GI and , Neuro, MSK and Psych review of systems/symptoms are otherwise negative or unchanged from previous, except as noted above.      OBJECTIVE:  /66  Pulse 72  Temp 98.6  F (37  C) (Oral)  Resp 16  Wt 143 lb (64.9 kg)  BMI 25.33 kg/m2   Estimated body mass index is 25.33 kg/(m^2) as calculated from the following:    Height as of 4/3/18: 5' 3\" (1.6 m).    Weight as of this encounter: 143 lb (64.9 kg).  Neck: no adenopathy. Thyroid normal to palpation. No bruits  Pulm: Lungs clear to auscultation   CV: Regular rates and rhythm  GI: Soft, nontender, Normal active bowel sounds, No hepatosplenomegaly or masses palpable  Ext: Peripheral pulses intact. No edema.  Neuro: Normal strength and tone, sensory exam grossly normal    Assessment/Plan: (See plan discussion below for further details)  1. Type 2 diabetes mellitus with stage 3 chronic kidney disease, without long-term current use of insulin (H)   A1C mildly above goal. Will improve diet and continue same meds for now. Repeat lab 3 mos  - metFORMIN (GLUCOPHAGE) 1000 MG tablet; Take 1 tablet (1,000 mg) by mouth 2 times daily (with meals)  Dispense: 180 tablet; Refill: 3  - Hemoglobin A1c; Future    2. Essential hypertension  SBP minimally above goal. Continue current meds  - hydrALAZINE (APRESOLINE) 50 MG tablet; Take 1.5 tablets (75 mg) by mouth 3 times daily  Dispense: 405 tablet; Refill: 3  - diltiazem (CARDIZEM CD) 360 MG 24 hr CD capsule; Take 1 capsule (360 mg) by mouth daily  Dispense: 90 capsule; Refill: 3  - labetalol (NORMODYNE) 100 MG tablet; Take 1 tablet (100 mg) by mouth 2 " times daily  Dispense: 180 tablet; Refill: 3  - losartan (COZAAR) 100 MG tablet; Take 1 tablet (100 mg) by mouth daily  Dispense: 90 tablet; Refill: 3    3. Mild persistent asthma without complication  controlled  - albuterol (PROAIR HFA/PROVENTIL HFA/VENTOLIN HFA) 108 (90 Base) MCG/ACT Inhaler; Inhale 2 puffs into the lungs every 6 hours as needed for shortness of breath / dyspnea or wheezing  Dispense: 1 Inhaler; Refill: 11    4. Gastroesophageal reflux disease without esophagitis  controlled  - omeprazole (PRILOSEC) 40 MG capsule; Take 1 capsule (40 mg) by mouth daily  Dispense: 90 capsule; Refill: 3    5. Hyperlipidemia LDL goal <70  LDL above goal. On max dose statin.  See plan discussion below.   - rosuvastatin (CRESTOR) 40 MG tablet; Take 1 tablet (40 mg) by mouth daily  Dispense: 90 tablet; Refill: 3  - ezetimibe (ZETIA) 10 MG tablet; Take 1 tablet (10 mg) by mouth daily  Dispense: 30 tablet; Refill: 1  - Lipid panel reflex to direct LDL Fasting; Future  - Hepatic panel; Future    6. Screening for osteoporosis  Due for DEXA as previously ordered    Plan discussion:   Bone density test   Ezetimibe 10mg tab, 1 tab daily for cholesterol  Continue other meds.  Prescriptions refilled on file  Fasting labs in 1 month  Nonfasting diabetes lab in 3 months  Reduce carbohydrate (sugars, starchy foods such as bread, rice, potato, pasta, etc) intake  in your diet and increase the amount of color on your plate with fruits, vegetables and lean meats.  Increase frequency of walking or other exercise          Jose De Jesus Snow MD  Internal Medicine Department  [facility]

## 2018-05-07 NOTE — MR AVS SNAPSHOT
After Visit Summary   5/7/2018    Cristela Salgado    MRN: [id number]           Patient Information     Date Of Birth          1940        Visit Information        Provider Department      5/7/2018 2:00 PM Jose De Jesus Snow MD [facility]        Today's Diagnoses     Essential hypertension        Mild persistent asthma without complication        Type 2 diabetes mellitus with stage 3 chronic kidney disease, without long-term current use of insulin (H)        Gastroesophageal reflux disease without esophagitis        Hyperlipidemia LDL goal <70          Care Instructions     Bone density test   Ezetimibe 10mg tab, 1 tab daily for cholesterol  Continue other meds.  Prescriptions refilled on file  Fasting labs in 1 month  Nonfasting diabetes lab in 3 months  Reduce carbohydrate (sugars, starchy foods such as bread, rice, potato, pasta, etc) intake  in your diet and increase the amount of color on your plate with fruits, vegetables and lean meats.  Increase frequency of walking or other exercise                  Follow-ups after your visit        Future tests that were ordered for you today     Open Future Orders        Priority Expected Expires Ordered    Lipid panel reflex to direct LDL Fasting Routine 6/6/2018 5/7/2019 5/7/2018    Hepatic panel Routine 6/6/2018 5/7/2019 5/7/2018    Hemoglobin A1c Routine 8/5/2018 5/7/2019 5/7/2018            Who to contact     If you have questions or need follow up information about today's clinic visit or your schedule please contact [facility] directly at [phone].  Normal or non-critical lab and imaging results will be communicated to you by MyChart, letter or phone within 4 business days after the clinic has received the results. If you do not hear from us within 7 days, please contact the clinic through MyChart or phone. If you have a critical or abnormal lab result, we will notify you by phone as soon as  "possible.  Submit refill requests through [employer] or call your pharmacy and they will forward the refill request to us. Please allow 3 business days for your refill to be completed.          Additional Information About Your Visit        Induction Manager Information     In[employer] Manager lets you send messages to your doctor, view your test results, renew your prescriptions, schedule appointments and more. To sign up, go to [email or web address] . Click on \"Log in\" on the left side of the screen, which will take you to the Welcome page. Then click on \"Sign up Now\" on the right side of the page.     You will be asked to enter the access code listed below, as well as some personal information. Please follow the directions to create your username and password.     Your access code is: [email or web address]  Expires: 2018  8:38 AM     Your access code will  in 90 days. If you need help or a new code, please call your [place] clinic or [phone].        Care EveryWhere ID     This is your Care EveryWhere ID. This could be used by other organizations to access your [facility] medical records  [id number]        Your Vitals Were     Pulse Temperature Respirations BMI (Body Mass Index)          72 98.6  F (37  C) (Oral) 16 25.33 kg/m2         Blood Pressure from Last 3 Encounters:   18 136/66   18 154/90   17 102/60    Weight from Last 3 Encounters:   18 143 lb (64.9 kg)   18 144 lb 14.4 oz (65.7 kg)   17 141 lb 11.2 oz (64.3 kg)                 Today's Medication Changes          These changes are accurate as of 18  3:09 PM.  If you have any questions, ask your nurse or doctor.               Start taking these medicines.        Dose/Directions    ezetimibe 10 MG tablet   Commonly known as:  ZETIA   Used for:  Hyperlipidemia LDL goal <70   Started by:  Jose De Jesus Snow MD        Dose:  10 mg   Take 1 tablet (10 mg) by mouth daily   Quantity:  30 tablet   Refills:  1         These medicines have " changed or have updated prescriptions.        Dose/Directions    losartan 100 MG tablet   Commonly known as:  COZAAR   This may have changed:  See the new instructions.   Used for:  Essential hypertension   Changed by:  Jose De Jesus Snow MD        Dose:  100 mg   Take 1 tablet (100 mg) by mouth daily   Quantity:  90 tablet   Refills:  3         Stop taking these medicines if you haven't already. Please contact your care team if you have questions.     potassium chloride SA 20 MEQ CR tablet   Commonly known as:  KLOR-CON   Stopped by:  Jose De Jesus Snow MD                Where to get your medicines      These medications were sent to [facility] [id number] [facility] - [place], [place] - [address]  [address] [facility] [address]     Phone:  [phone]     albuterol 108 (90 Base) MCG/ACT Inhaler    diltiazem 360 MG 24 hr CD capsule    ezetimibe 10 MG tablet    hydrALAZINE 50 MG tablet    labetalol 100 MG tablet    losartan 100 MG tablet    metFORMIN 1000 MG tablet    omeprazole 40 MG capsule    rosuvastatin 40 MG tablet                Primary Care Provider Office Phone # Fax #    Jose De Jesus Snow [phone]       [address]  [facility] [address]        Equal Access to Services     BERT LOMELI AH: Hadii aad ku hadasho Soomaali, waaxda luqadaha, qaybta kaalmada adeegyada, waxay idiin hayaan ademigue de leon. So [facility] [phone].    ATENCIÓN: Si habla español, tiene a castro disposición servicios gratuitos de asistencia lingüística. [facility] [phone].    We comply with applicable federal civil rights laws and [place] laws. We do not discriminate on the basis of race, color, national origin, age, disability, sex, sexual orientation, or gender identity.            Thank you!     Thank you for choosing [facility]  for your care. Our goal is always to provide you with excellent care. Hearing back from our patients is one way we can continue to improve our services. Please take a few  minutes to complete the written survey that you may receive in the mail after your visit with us. Thank you!             Your Updated Medication List - Protect others around you: Learn how to safely use, store and throw away your medicines at www.disposemymeds.org.          This list is accurate as of 5/7/18  3:09 PM.  Always use your most recent med list.                   Brand Name Dispense Instructions for use Diagnosis    ADVAIR DISKUS 500-50 MCG/DOSE diskus inhaler   Generic drug:  fluticasone-salmeterol     1 Inhaler    INHALE 1 PUFF INTO THE LUNGS EVERY 12 HOURS    Mild persistent asthma without complication       albuterol 108 (90 Base) MCG/ACT Inhaler    PROAIR HFA/PROVENTIL HFA/VENTOLIN HFA    1 Inhaler    Inhale 2 puffs into the lungs every 6 hours as needed for shortness of breath / dyspnea or wheezing    Mild persistent asthma without complication       aspirin 81 MG tablet      Take 1 tablet by mouth daily.        cholecalciferol 2000 units tablet      Take 1 tablet by mouth daily        diltiazem 360 MG 24 hr CD capsule    CARDIZEM CD    90 capsule    Take 1 capsule (360 mg) by mouth daily    Essential hypertension       ezetimibe 10 MG tablet    ZETIA    30 tablet    Take 1 tablet (10 mg) by mouth daily    Hyperlipidemia LDL goal <70       hydrALAZINE 50 MG tablet    APRESOLINE    405 tablet    Take 1.5 tablets (75 mg) by mouth 3 times daily    Essential hypertension       labetalol 100 MG tablet    NORMODYNE    180 tablet    Take 1 tablet (100 mg) by mouth 2 times daily    Essential hypertension       losartan 100 MG tablet    COZAAR    90 tablet    Take 1 tablet (100 mg) by mouth daily    Essential hypertension       metFORMIN 1000 MG tablet    GLUCOPHAGE    180 tablet    Take 1 tablet (1,000 mg) by mouth 2 times daily (with meals)    Type 2 diabetes mellitus with stage 3 chronic kidney disease, without long-term current use of insulin (H)       omeprazole 40 MG capsule    priLOSEC    90 capsule     Take 1 capsule (40 mg) by mouth daily    Gastroesophageal reflux disease without esophagitis       rosuvastatin 40 MG tablet    CRESTOR    90 tablet    Take 1 tablet (40 mg) by mouth daily    Hyperlipidemia LDL goal <70

## 2018-05-07 NOTE — PATIENT INSTRUCTIONS
Bone density test   Ezetimibe 10mg tab, 1 tab daily for cholesterol  Continue other meds.  Prescriptions refilled on file  Fasting labs in 1 month  Nonfasting diabetes lab in 3 months  Reduce carbohydrate (sugars, starchy foods such as bread, rice, potato, pasta, etc) intake  in your diet and increase the amount of color on your plate with fruits, vegetables and lean meats.  Increase frequency of walking or other exercise

## 2018-07-01 DIAGNOSIS — J45.30 MILD PERSISTENT ASTHMA WITHOUT COMPLICATION: ICD-10-CM

## 2018-07-02 DIAGNOSIS — E11.22 TYPE 2 DIABETES MELLITUS WITH STAGE 3 CHRONIC KIDNEY DISEASE, WITHOUT LONG-TERM CURRENT USE OF INSULIN (H): ICD-10-CM

## 2018-07-02 DIAGNOSIS — E78.5 HYPERLIPIDEMIA LDL GOAL <70: ICD-10-CM

## 2018-07-02 DIAGNOSIS — N18.30 TYPE 2 DIABETES MELLITUS WITH STAGE 3 CHRONIC KIDNEY DISEASE, WITHOUT LONG-TERM CURRENT USE OF INSULIN (H): ICD-10-CM

## 2018-07-02 LAB
ALBUMIN SERPL-MCNC: 3.3 G/DL (ref 3.4–5)
ALP SERPL-CCNC: 63 U/L (ref 40–150)
ALT SERPL W P-5'-P-CCNC: 21 U/L (ref 0–50)
AST SERPL W P-5'-P-CCNC: 17 U/L (ref 0–45)
BILIRUB DIRECT SERPL-MCNC: 0.2 MG/DL (ref 0–0.2)
BILIRUB SERPL-MCNC: 0.6 MG/DL (ref 0.2–1.3)
CHOLEST SERPL-MCNC: 139 MG/DL
HBA1C MFR BLD: 7 % (ref 0–5.6)
HDLC SERPL-MCNC: 58 MG/DL
LDLC SERPL CALC-MCNC: 62 MG/DL
NONHDLC SERPL-MCNC: 81 MG/DL
PROT SERPL-MCNC: 6.2 G/DL (ref 6.8–8.8)
TRIGL SERPL-MCNC: 95 MG/DL

## 2018-07-02 PROCEDURE — 83036 HEMOGLOBIN GLYCOSYLATED A1C: CPT | Performed by: INTERNAL MEDICINE

## 2018-07-02 PROCEDURE — 99207 ZZC RSCC CODE FOR CODING REVIEW: CPT | Performed by: INTERNAL MEDICINE

## 2018-07-02 PROCEDURE — 80061 LIPID PANEL: CPT | Performed by: INTERNAL MEDICINE

## 2018-07-02 PROCEDURE — 36415 COLL VENOUS BLD VENIPUNCTURE: CPT | Performed by: INTERNAL MEDICINE

## 2018-07-02 PROCEDURE — 80076 HEPATIC FUNCTION PANEL: CPT | Performed by: INTERNAL MEDICINE

## 2018-07-02 NOTE — LETTER
"[facility]  [address] [address]  [phone]      3/23/2019       Cristela Salgado  [address]  [facility] [address]        Dear Cristela,  Review of your chart shows you will be due for follow-up laboratory monitoring studies in April with your current medication use.  Call our appointment desk at [phone] to schedule a  fasting blood and urine  \"lab only\"  appointment at that time  For fasting labs, please refrain from eating for 8 hours or more. You may drink water and take medications.   Please also make an appointment to see me a few days after the lab test is drawn  to review the results and follow-up on blood sugar and. blood pressure  control and  other medical issues as necessary. Check blood sugars twice a day (before breakfast and bedtime) for 4 days prior to the appointment with me and bring the results to the appointment.    Sincerely,      Jose De Jesus Snow MD  Internal Medicine      "

## 2018-07-03 NOTE — TELEPHONE ENCOUNTER
"Requested Prescriptions   Pending Prescriptions Disp Refills     ADVAIR DISKUS 500-50 MCG/DOSE diskus inhaler [Pharmacy Med Name: ADVAIR 500-50 DISKUS]  Last Written Prescription Date:  3/27/18  Last Fill Quantity: 1 inhaler,  # refills: 0   Last Office Visit: 5/7/2018   Future Office Visit:       0     Sig: INHALE 1 PUFF INTO THE LUNGS EVERY 12 HOURS    Inhaled Steroids Protocol Passed    7/1/2018  1:26 AM       Passed - Patient is age 12 or older       Passed - Asthma control assessment score within normal limits in last 6 months    Please review ACT score.   ACT Total Scores 5/12/2016 6/2/2017 4/3/2018   ACT TOTAL SCORE - - -   ASTHMA ER VISITS - - -   ASTHMA HOSPITALIZATIONS - - -   ACT TOTAL SCORE (Goal Greater than or Equal to 20) 18 20 20   In the past 12 months, how many times did you visit the emergency room for your asthma without being admitted to the hospital? 0 0 0   In the past 12 months, how many times were you hospitalized overnight because of your asthma? 0 0 0            Passed - Recent (6 mo) or future (30 days) visit within the authorizing provider's specialty    Patient had office visit in the last 6 months or has a visit in the next 30 days with authorizing provider or within the authorizing provider's specialty.  See \"Patient Info\" tab in inbasket, or \"Choose Columns\" in Meds & Orders section of the refill encounter.              "

## 2019-04-12 DIAGNOSIS — E11.22 TYPE 2 DIABETES MELLITUS WITH STAGE 3 CHRONIC KIDNEY DISEASE, WITHOUT LONG-TERM CURRENT USE OF INSULIN (H): ICD-10-CM

## 2019-04-12 DIAGNOSIS — E78.5 HYPERLIPIDEMIA LDL GOAL <70: ICD-10-CM

## 2019-04-12 DIAGNOSIS — N18.30 TYPE 2 DIABETES MELLITUS WITH STAGE 3 CHRONIC KIDNEY DISEASE, WITHOUT LONG-TERM CURRENT USE OF INSULIN (H): ICD-10-CM

## 2019-04-12 LAB
ALBUMIN SERPL-MCNC: 3.5 G/DL (ref 3.4–5)
ALP SERPL-CCNC: 88 U/L (ref 40–150)
ALT SERPL W P-5'-P-CCNC: 17 U/L (ref 0–50)
ANION GAP SERPL CALCULATED.3IONS-SCNC: 6 MMOL/L (ref 3–14)
AST SERPL W P-5'-P-CCNC: 12 U/L (ref 0–45)
BILIRUB SERPL-MCNC: 0.6 MG/DL (ref 0.2–1.3)
BUN SERPL-MCNC: 13 MG/DL (ref 7–30)
CALCIUM SERPL-MCNC: 9 MG/DL (ref 8.5–10.1)
CHLORIDE SERPL-SCNC: 110 MMOL/L (ref 94–109)
CHOLEST SERPL-MCNC: 190 MG/DL
CO2 SERPL-SCNC: 24 MMOL/L (ref 20–32)
CREAT SERPL-MCNC: 1.04 MG/DL (ref 0.52–1.04)
CREAT UR-MCNC: 187 MG/DL
GFR SERPL CREATININE-BSD FRML MDRD: 52 ML/MIN/{1.73_M2}
GLUCOSE SERPL-MCNC: 157 MG/DL (ref 70–99)
HBA1C MFR BLD: 7.1 % (ref 0–5.6)
HDLC SERPL-MCNC: 62 MG/DL
LDLC SERPL CALC-MCNC: 99 MG/DL
MICROALBUMIN UR-MCNC: 22 MG/L
MICROALBUMIN/CREAT UR: 11.71 MG/G CR (ref 0–25)
NONHDLC SERPL-MCNC: 128 MG/DL
POTASSIUM SERPL-SCNC: 3.8 MMOL/L (ref 3.4–5.3)
PROT SERPL-MCNC: 6.6 G/DL (ref 6.8–8.8)
SODIUM SERPL-SCNC: 140 MMOL/L (ref 133–144)
T4 FREE SERPL-MCNC: 0.99 NG/DL (ref 0.76–1.46)
TRIGL SERPL-MCNC: 143 MG/DL
TSH SERPL DL<=0.005 MIU/L-ACNC: 4.17 MU/L (ref 0.4–4)

## 2019-04-12 PROCEDURE — 82043 UR ALBUMIN QUANTITATIVE: CPT | Performed by: INTERNAL MEDICINE

## 2019-04-12 PROCEDURE — 84443 ASSAY THYROID STIM HORMONE: CPT | Performed by: INTERNAL MEDICINE

## 2019-04-12 PROCEDURE — 80061 LIPID PANEL: CPT | Performed by: INTERNAL MEDICINE

## 2019-04-12 PROCEDURE — 84439 ASSAY OF FREE THYROXINE: CPT | Performed by: INTERNAL MEDICINE

## 2019-04-12 PROCEDURE — 36415 COLL VENOUS BLD VENIPUNCTURE: CPT | Performed by: INTERNAL MEDICINE

## 2019-04-12 PROCEDURE — 83036 HEMOGLOBIN GLYCOSYLATED A1C: CPT | Performed by: INTERNAL MEDICINE

## 2019-04-12 PROCEDURE — 80053 COMPREHEN METABOLIC PANEL: CPT | Performed by: INTERNAL MEDICINE

## 2019-04-15 NOTE — PROGRESS NOTES
SUBJECTIVE:   Cristela Salgado is a 78 year old female who presents to clinic today for the following   health issues:    Chief Complaint   Patient presents with     Diabetes     Hyperlipidemia     Hypertension     Medicare Visit       Diabetes Follow-up      Patient is checking blood sugars: rarely.  Results range from 110's AMs  to 160's later afternoons    Diabetic concerns: None     Symptoms of hypoglycemia (low blood sugar): none     Paresthesias (numbness or burning in feet) or sores: No     Date of last diabetic eye exam: Eye exam  June 2018 at [facility]    Diabetes Management Resources    Hyperlipidemia Follow-Up      Rate your low fat/cholesterol diet?: fair    Taking statin?  Yes, no muscle aches from statin    Other lipid medications/supplements?:  none    Hypertension Follow-up      Outpatient blood pressures are being checked at home.  Results are within normal limits.    Low Salt Diet: low salt    BP Readings from Last 2 Encounters:   05/07/18 136/66   04/03/18 154/90     Hemoglobin A1C (%)   Date Value   04/12/2019 7.1 (H)   07/02/2018 7.0 (H)     LDL Cholesterol Calculated (mg/dL)   Date Value   04/12/2019 99   07/02/2018 62       Amount of exercise or physical activity: Minimal, dur to the Winter    Problems taking medications regularly: No    Medication side effects: none    Diet: low salt and low fat/cholesterol    Component      Latest Ref Rng & Units 5/2/2018 7/2/2018 4/12/2019   Sodium      133 - 144 mmol/L 143  140   Potassium      3.4 - 5.3 mmol/L 3.9  3.8   Chloride      94 - 109 mmol/L 109  110 (H)   Carbon Dioxide      20 - 32 mmol/L 22  24   Anion Gap      3 - 14 mmol/L 12  6   Glucose      70 - 99 mg/dL 179 (H)  157 (H)   Urea Nitrogen      7 - 30 mg/dL 17  13   Creatinine      0.52 - 1.04 mg/dL 1.02  1.04   GFR Estimate      >60 mL/min/1.73:m2 53 (L)  52 (L)   GFR Estimate If Black      >60 mL/min/1.73:m2 64  60 (L)   Calcium      8.5 - 10.1 mg/dL 8.9  9.0   Bilirubin Total    "   0.2 - 1.3 mg/dL 0.6 0.6 0.6   Albumin      3.4 - 5.0 g/dL 3.6 3.3 (L) 3.5   Protein Total      6.8 - 8.8 g/dL 6.6 (L) 6.2 (L) 6.6 (L)   Alkaline Phosphatase      40 - 150 U/L 73 63 88   ALT      0 - 50 U/L 22 21 17   AST      0 - 45 U/L 10 17 12   Bilirubin Direct      0.0 - 0.2 mg/dL  0.2    Cholesterol      <200 mg/dL 191 139 190   Triglycerides      <150 mg/dL 146 95 143   HDL Cholesterol      >49 mg/dL 55 58 62   LDL Cholesterol Calculated      <100 mg/dL 107 (H) 62 99   Non HDL Cholesterol      <130 mg/dL 136 (H) 81 128   Creatinine Urine      mg/dL 202  187   Albumin Urine mg/L      mg/L 26  22   Albumin Urine mg/g Cr      0 - 25 mg/g Cr 12.67  11.71   Hemoglobin A1C      0 - 5.6 % 7.2 (H) 7.0 (H) 7.1 (H)   TSH      0.40 - 4.00 mU/L 3.16  4.17 (H)   T4 Free      0.76 - 1.46 ng/dL   0.99         Annual Wellness Visit    Are you in the first 12 months of your Medicare Part B coverage?  No    Physical Health:    In general, how would you rate your overall physical health? good    If you drink alcohol do you typically have >3 drinks per day or >7 drinks per week? No    Do you usually eat at least 4 servings of fruit and vegetables a day, include whole grains & fiber and avoid regularly eating high fat or \"junk\" foods? Yes    Needs assistance for the following daily activities: no assistance needed    Which of the following safety concerns are present in your home?  none identified     Hearing impairment: No    In the past 6 months, have you been bothered by leaking of urine? no    Mental Health:    In general, how would you rate your overall mental or emotional health? excellent  PHQ-2 Score: 0    Do you feel safe in your environment? Yes    Do you have a Health Care Directive? Yes: Patient states has Advance Directive and will bring in a copy to clinic.    Fall risk:  Fallen 2 or more times in the past year?: No  Any fall with injury in the past year?: No    Cognitive Screenin) Repeat 3 items (Leader, " "Season, Table)    2) Clock draw: NORMAL  3) 3 item recall: Recalls 3 objects  Results: 3 items recalled: COGNITIVE IMPAIRMENT LESS LIKELY    Mini-CogTM Copyright S Breezy. Licensed by the author for use in [facility]; reprinted with permission ([email or web address]@.[facility]). All rights reserved.      Current providers sharing in care for this patient include:   Patient Care Team:  Jose De Jesus Snow MD as PCP - General (Internal Medicine)  Jose De Jesus Snow MD as Assigned PCP        Do you have sleep apnea, excessive snoring or daytime drowsiness?: no    Additional history: none    Reviewed  and updated as needed this visit by clinical staff         Reviewed and updated as needed this visit by Provider       Pt's past medical history, family history, habits, medications and allergies were reviewed with the patient today.  See snap shot for  HCM status. Most recent lab results reviewed with pt. Problem list and histories reviewed & adjusted, as indicated.  Additional history as below:    Denies CP, SOB with use of inhaler therapy, abdominal pain, polyuria, polydipsia, vision changes, extremity numbness/parasthesias or skin problems.  ACT = 24       Additional ROS:   Constitutional, HEENT, Cardiovascular, Pulmonary, GI and , Neuro, MSK and Psych review of systems/symptoms are otherwise negative or unchanged from previous, except as noted above.      OBJECTIVE:  /62   Pulse 74   Temp 98.5  F (36.9  C) (Oral)   Resp 16   Ht 1.562 m (5' 1.5\")   Wt 61.7 kg (136 lb)   SpO2 95%   BMI 25.28 kg/m     Estimated body mass index is 25.28 kg/m  as calculated from the following:    Height as of this encounter: 1.562 m (5' 1.5\").    Weight as of this encounter: 61.7 kg (136 lb).     Neck: no adenopathy. Thyroid normal to palpation. No bruits  Pulm: Lungs clear to auscultation   CV: Regular rates and rhythm. 1/6 JANES LLSB (old)  GI: Soft, nontender, Normal active bowel sounds, No hepatosplenomegaly or masses palpable  Ext: " Peripheral pulses intact. No edema. Mild dryness skin of feet. Bilateral bunion and  right 5th toe turns in slightly  Neuro: Normal strength and tone, sensory exam grossly normal    Assessment/Plan: (See plan discussion below for further details)    1. Type 2 diabetes mellitus with stage 3 chronic kidney disease, without long-term current use of insulin (H)  Controlled for age. Continue current therapy. Will try to lower carb intake in diet and repeat labs  6 mos  - metFORMIN (GLUCOPHAGE) 1000 MG tablet; Take 1 tablet (1,000 mg) by mouth 2 times daily (with meals)  Dispense: 180 tablet; Refill: 3  - Hemoglobin A1c; Future  - Basic metabolic panel; Future  - EYE EXAM (SIMPLE-NONBILLABLE)  - FOOT EXAM    2. CKD (chronic kidney disease) stage 3, GFR 30-59 ml/min (H)  Stable. Repeat labs 6 mos  - Basic metabolic panel; Future  - Hemoglobin; Future    3. Medicare annual wellness visit, subsequent  See below for HCM discussions. Otherwise UTD    4. Mild persistent asthma without complication  controlled  - fluticasone-salmeterol (ADVAIR DISKUS) 500-50 MCG/DOSE inhaler; INHALE 1 PUFF INTO THE LUNGS EVERY 12 HOURS  Dispense: 60 Inhaler; Refill: 11  - albuterol (PROAIR HFA/PROVENTIL HFA/VENTOLIN HFA) 108 (90 Base) MCG/ACT inhaler; Inhale 2 puffs into the lungs every 6 hours as needed for shortness of breath / dyspnea or wheezing  Dispense: 18 g; Refill: 11    5. Essential hypertension  controlled  - diltiazem ER COATED BEADS (CARDIZEM CD) 360 MG 24 hr capsule; Take 1 capsule (360 mg) by mouth daily  Dispense: 90 capsule; Refill: 3  - hydrALAZINE (APRESOLINE) 50 MG tablet; Take 1.5 tablets (75 mg) by mouth 3 times daily  Dispense: 405 tablet; Refill: 3  - labetalol (NORMODYNE) 100 MG tablet; Take 1 tablet (100 mg) by mouth 2 times daily  Dispense: 180 tablet; Refill: 3  - losartan (COZAAR) 100 MG tablet; Take 1 tablet (100 mg) by mouth daily  Dispense: 90 tablet; Refill: 3    6. Gastroesophageal reflux disease without  esophagitis  Controlled. Has recurrence of sx off of med  - omeprazole (PRILOSEC) 40 MG DR capsule; Take 1 capsule (40 mg) by mouth daily  Dispense: 90 capsule; Refill: 3    7. Hyperlipidemia LDL goal <70  controlled  - rosuvastatin (CRESTOR) 40 MG tablet; Take 1 tablet (40 mg) by mouth daily  Dispense: 90 tablet; Refill: 3  - Comprehensive metabolic panel; Future  - Lipid panel reflex to direct LDL Fasting; Future    8. Encounter for screening mammogram for breast cancer  - *MA Screening Digital Bilateral; Future      Plan discussion:  Continue current meds  Prescriptions refilled on file  Call  [phone]  to schedule a future lab appointment  non-fasting in mid October. See me a few days later to review reuslts and follow-up on blood pressure status   Check with insurance or speak with your pharmacist re: Shingrix vaccine coverage for shingles prevention.  This is a 2 shot series done 2-6 months apart  Mammogram and bone density test after 5/3/19       Jose De Jesus Snow MD  Internal Medicine Department  [facility]

## 2019-04-16 ENCOUNTER — OFFICE VISIT (OUTPATIENT)
Dept: INTERNAL MEDICINE | Facility: CLINIC | Age: 79
End: 2019-04-16
Payer: COMMERCIAL

## 2019-04-16 VITALS
BODY MASS INDEX: 25.03 KG/M2 | DIASTOLIC BLOOD PRESSURE: 62 MMHG | TEMPERATURE: 98.5 F | SYSTOLIC BLOOD PRESSURE: 132 MMHG | WEIGHT: 136 LBS | OXYGEN SATURATION: 95 % | HEIGHT: 62 IN | RESPIRATION RATE: 16 BRPM | HEART RATE: 74 BPM

## 2019-04-16 DIAGNOSIS — K21.9 GASTROESOPHAGEAL REFLUX DISEASE WITHOUT ESOPHAGITIS: ICD-10-CM

## 2019-04-16 DIAGNOSIS — Z00.00 MEDICARE ANNUAL WELLNESS VISIT, SUBSEQUENT: ICD-10-CM

## 2019-04-16 DIAGNOSIS — I10 ESSENTIAL HYPERTENSION: ICD-10-CM

## 2019-04-16 DIAGNOSIS — E78.5 HYPERLIPIDEMIA LDL GOAL <70: ICD-10-CM

## 2019-04-16 DIAGNOSIS — Z12.31 ENCOUNTER FOR SCREENING MAMMOGRAM FOR BREAST CANCER: ICD-10-CM

## 2019-04-16 DIAGNOSIS — E11.22 TYPE 2 DIABETES MELLITUS WITH STAGE 3 CHRONIC KIDNEY DISEASE, WITHOUT LONG-TERM CURRENT USE OF INSULIN (H): ICD-10-CM

## 2019-04-16 DIAGNOSIS — N18.30 TYPE 2 DIABETES MELLITUS WITH STAGE 3 CHRONIC KIDNEY DISEASE, WITHOUT LONG-TERM CURRENT USE OF INSULIN (H): ICD-10-CM

## 2019-04-16 DIAGNOSIS — J45.30 MILD PERSISTENT ASTHMA WITHOUT COMPLICATION: ICD-10-CM

## 2019-04-16 DIAGNOSIS — N18.30 CKD (CHRONIC KIDNEY DISEASE) STAGE 3, GFR 30-59 ML/MIN (H): ICD-10-CM

## 2019-04-16 PROCEDURE — 99207 C FOOT EXAM  NO CHARGE: CPT | Mod: 25 | Performed by: INTERNAL MEDICINE

## 2019-04-16 PROCEDURE — G0439 PPPS, SUBSEQ VISIT: HCPCS | Performed by: INTERNAL MEDICINE

## 2019-04-16 PROCEDURE — 99214 OFFICE O/P EST MOD 30 MIN: CPT | Mod: 25 | Performed by: INTERNAL MEDICINE

## 2019-04-16 RX ORDER — LOSARTAN POTASSIUM 100 MG/1
100 TABLET ORAL DAILY
Qty: 90 TABLET | Refills: 3 | Status: SHIPPED | OUTPATIENT
Start: 2019-04-16 | End: 2020-01-20

## 2019-04-16 RX ORDER — HYDRALAZINE HYDROCHLORIDE 50 MG/1
75 TABLET, FILM COATED ORAL 3 TIMES DAILY
Qty: 405 TABLET | Refills: 3 | Status: SHIPPED | OUTPATIENT
Start: 2019-04-16 | End: 2020-03-30

## 2019-04-16 RX ORDER — OMEPRAZOLE 40 MG/1
40 CAPSULE, DELAYED RELEASE ORAL DAILY
Qty: 90 CAPSULE | Refills: 3 | Status: SHIPPED | OUTPATIENT
Start: 2019-04-16 | End: 2020-03-30

## 2019-04-16 RX ORDER — DILTIAZEM HYDROCHLORIDE 360 MG/1
360 CAPSULE, EXTENDED RELEASE ORAL DAILY
Qty: 90 CAPSULE | Refills: 3 | Status: SHIPPED | OUTPATIENT
Start: 2019-04-16 | End: 2020-03-30

## 2019-04-16 RX ORDER — ALBUTEROL SULFATE 90 UG/1
2 AEROSOL, METERED RESPIRATORY (INHALATION) EVERY 6 HOURS PRN
Qty: 18 G | Refills: 11 | Status: SHIPPED | OUTPATIENT
Start: 2019-04-16 | End: 2020-03-30

## 2019-04-16 RX ORDER — LABETALOL 100 MG/1
100 TABLET, FILM COATED ORAL 2 TIMES DAILY
Qty: 180 TABLET | Refills: 3 | Status: SHIPPED | OUTPATIENT
Start: 2019-04-16 | End: 2020-03-30

## 2019-04-16 RX ORDER — ROSUVASTATIN CALCIUM 40 MG/1
40 TABLET, COATED ORAL DAILY
Qty: 90 TABLET | Refills: 3 | Status: SHIPPED | OUTPATIENT
Start: 2019-04-16 | End: 2020-03-30

## 2019-04-16 ASSESSMENT — MIFFLIN-ST. JEOR: SCORE: 1047.2

## 2019-04-16 ASSESSMENT — ASTHMA QUESTIONNAIRES
QUESTION_3 LAST FOUR WEEKS HOW OFTEN DID YOUR ASTHMA SYMPTOMS (WHEEZING, COUGHING, SHORTNESS OF BREATH, CHEST TIGHTNESS OR PAIN) WAKE YOU UP AT NIGHT OR EARLIER THAN USUAL IN THE MORNING: NOT AT ALL
ACT_TOTALSCORE: 24
QUESTION_5 LAST FOUR WEEKS HOW WOULD YOU RATE YOUR ASTHMA CONTROL: WELL CONTROLLED
QUESTION_1 LAST FOUR WEEKS HOW MUCH OF THE TIME DID YOUR ASTHMA KEEP YOU FROM GETTING AS MUCH DONE AT WORK, SCHOOL OR AT HOME: NONE OF THE TIME
ACUTE_EXACERBATION_TODAY: NO
QUESTION_2 LAST FOUR WEEKS HOW OFTEN HAVE YOU HAD SHORTNESS OF BREATH: NOT AT ALL
QUESTION_4 LAST FOUR WEEKS HOW OFTEN HAVE YOU USED YOUR RESCUE INHALER OR NEBULIZER MEDICATION (SUCH AS ALBUTEROL): NOT AT ALL

## 2019-04-16 NOTE — LETTER
[facility]  [address] [address]  [phone]        February 21, 2020    Cristela Salgado  [address]  [facility] [address]              Dear Cristela Salgado    This is to remind you that your non-fasting labs is due.    You may call our office at [phone] to schedule an appointment.    Please disregard this notice if you have already had your labs drawn or made an appointment.        Sincerely,        Jose De Jesus Snwo MD

## 2019-04-16 NOTE — PATIENT INSTRUCTIONS
Continue current meds  Prescriptions refilled on file  Call  [phone]  to schedule a future lab appointment  non-fasting in mid October. See me a few days later to review reuslts and follow-up on blood pressure status   Check with insurance or speak with your pharmacist re: Shingrix vaccine coverage for shingles prevention.  This is a 2 shot series done 2-6 months apart  Mammogram and bone density test after 5/3/19

## 2019-04-17 ASSESSMENT — ASTHMA QUESTIONNAIRES: ACT_TOTALSCORE: 24

## 2019-05-17 ENCOUNTER — HOSPITAL ENCOUNTER (OUTPATIENT)
Dept: CT IMAGING | Facility: CLINIC | Age: 79
Discharge: HOME | End: 2019-05-17
Attending: PHYSICIAN ASSISTANT | Admitting: PHYSICIAN ASSISTANT
Payer: COMMERCIAL

## 2019-05-17 ENCOUNTER — OFFICE VISIT (OUTPATIENT)
Dept: INTERNAL MEDICINE | Facility: CLINIC | Age: 79
End: 2019-05-17
Payer: COMMERCIAL

## 2019-05-17 VITALS
BODY MASS INDEX: 25.47 KG/M2 | HEART RATE: 76 BPM | DIASTOLIC BLOOD PRESSURE: 70 MMHG | SYSTOLIC BLOOD PRESSURE: 112 MMHG | OXYGEN SATURATION: 98 % | TEMPERATURE: 98 F | WEIGHT: 137 LBS

## 2019-05-17 DIAGNOSIS — R19.7 DIARRHEA, UNSPECIFIED TYPE: ICD-10-CM

## 2019-05-17 DIAGNOSIS — R10.32 LLQ ABDOMINAL PAIN: ICD-10-CM

## 2019-05-17 DIAGNOSIS — R10.32 LLQ ABDOMINAL PAIN: Primary | ICD-10-CM

## 2019-05-17 LAB
ALBUMIN SERPL-MCNC: 3.4 G/DL (ref 3.4–5)
ALP SERPL-CCNC: 82 U/L (ref 40–150)
ALT SERPL W P-5'-P-CCNC: 16 U/L (ref 0–50)
ANION GAP SERPL CALCULATED.3IONS-SCNC: 8 MMOL/L (ref 3–14)
AST SERPL W P-5'-P-CCNC: 9 U/L (ref 0–45)
BASOPHILS # BLD AUTO: 0.1 10E9/L (ref 0–0.2)
BASOPHILS NFR BLD AUTO: 0.9 %
BILIRUB SERPL-MCNC: 0.5 MG/DL (ref 0.2–1.3)
BUN SERPL-MCNC: 12 MG/DL (ref 7–30)
CALCIUM SERPL-MCNC: 9.1 MG/DL (ref 8.5–10.1)
CHLORIDE SERPL-SCNC: 111 MMOL/L (ref 94–109)
CO2 SERPL-SCNC: 22 MMOL/L (ref 20–32)
CREAT SERPL-MCNC: 1.06 MG/DL (ref 0.52–1.04)
DIFFERENTIAL METHOD BLD: NORMAL
EOSINOPHIL # BLD AUTO: 0.3 10E9/L (ref 0–0.7)
EOSINOPHIL NFR BLD AUTO: 4.7 %
ERYTHROCYTE [DISTWIDTH] IN BLOOD BY AUTOMATED COUNT: 13.2 % (ref 10–15)
GFR SERPL CREATININE-BSD FRML MDRD: 50 ML/MIN/{1.73_M2}
GLUCOSE SERPL-MCNC: 153 MG/DL (ref 70–99)
HCT VFR BLD AUTO: 39.4 % (ref 35–47)
HGB BLD-MCNC: 13.4 G/DL (ref 11.7–15.7)
LYMPHOCYTES # BLD AUTO: 1.3 10E9/L (ref 0.8–5.3)
LYMPHOCYTES NFR BLD AUTO: 18.9 %
MCH RBC QN AUTO: 30.2 PG (ref 26.5–33)
MCHC RBC AUTO-ENTMCNC: 34 G/DL (ref 31.5–36.5)
MCV RBC AUTO: 89 FL (ref 78–100)
MONOCYTES # BLD AUTO: 0.7 10E9/L (ref 0–1.3)
MONOCYTES NFR BLD AUTO: 10.3 %
NEUTROPHILS # BLD AUTO: 4.3 10E9/L (ref 1.6–8.3)
NEUTROPHILS NFR BLD AUTO: 65.2 %
PLATELET # BLD AUTO: 245 10E9/L (ref 150–450)
POTASSIUM SERPL-SCNC: 3.7 MMOL/L (ref 3.4–5.3)
PROT SERPL-MCNC: 6.9 G/DL (ref 6.8–8.8)
RBC # BLD AUTO: 4.43 10E12/L (ref 3.8–5.2)
SODIUM SERPL-SCNC: 141 MMOL/L (ref 133–144)
WBC # BLD AUTO: 6.6 10E9/L (ref 4–11)

## 2019-05-17 PROCEDURE — 85025 COMPLETE CBC W/AUTO DIFF WBC: CPT | Performed by: PHYSICIAN ASSISTANT

## 2019-05-17 PROCEDURE — 80053 COMPREHEN METABOLIC PANEL: CPT | Performed by: PHYSICIAN ASSISTANT

## 2019-05-17 PROCEDURE — 99214 OFFICE O/P EST MOD 30 MIN: CPT | Performed by: PHYSICIAN ASSISTANT

## 2019-05-17 PROCEDURE — 36415 COLL VENOUS BLD VENIPUNCTURE: CPT | Performed by: PHYSICIAN ASSISTANT

## 2019-05-17 PROCEDURE — 74176 CT ABD & PELVIS W/O CONTRAST: CPT

## 2019-05-17 NOTE — PROGRESS NOTES
SUBJECTIVE:   Cristela Salgado is a 78 year old female who presents to clinic today for the following   health issues:    Diarrhea  Onset: 2 weeks, worsening     Description:   Consistency of stool: watery  Blood in stool: no   Number of loose stools in past 24 hours: 10    Progression of Symptoms:  worsening    Accompanying Signs & Symptoms:  Fever: no   Nausea or vomiting; no   Abdominal pain: YES, notes mostly cramping   Episodes of constipation: no   Weight loss: YES     History:   Ill contacts: no   Recent use of antibiotics: no    Recent travels: no          Recent medication-new or changes(Rx or OTC): no     Precipitating factors:   none    Alleviating factors:   none    Therapies Tried and outcome:  none; Outcome: none      Additional history: as documented    Reviewed  and updated as needed this visit by clinical staff  Tobacco  Allergies  Meds  Problems         Reviewed and updated as needed this visit by Provider  Meds  Problems         OBJECTIVE:     /70   Pulse 76   Temp 98  F (36.7  C) (Temporal)   Wt 62.1 kg (137 lb)   SpO2 98%   BMI 25.47 kg/m    Body mass index is 25.47 kg/m .  GENERAL: healthy, alert and no distress  RESP: lungs clear to auscultation - no rales, rhonchi or wheezes  CV: regular rates and rhythm, normal S1 S2, no S3 or S4 and no murmur, click or rub  ABDOMEN: soft, without hepatosplenomegaly or masses, tenderness lower abdomen, more prominent on LLQ and bowel sounds normal    ASSESSMENT/PLAN:       1. LLQ abdominal pain  - unsure cause, needs further work up as below  - differential includes viral gastroenteritis, c diff and diverticulitis as most likely causes at this time   - CT ordered as stat   - reviewed signs and symptoms that should prompt seeking emergency care   - CT Abdomen Pelvis w Contrast; Future  - CBC with platelets and differential  - Comprehensive metabolic panel    2. Diarrhea, unspecified type  - CBC with platelets and differential  -  Comprehensive metabolic panel  - Clostridium difficile Toxin B PCR; Future  - Enteric Bacteria and Virus Panel by HUMBERTO Stool; Future    Marialuisa Kurtz PA-C  [facility]

## 2019-05-19 PROCEDURE — 87506 IADNA-DNA/RNA PROBE TQ 6-11: CPT | Performed by: PHYSICIAN ASSISTANT

## 2019-05-20 DIAGNOSIS — R19.7 DIARRHEA, UNSPECIFIED TYPE: ICD-10-CM

## 2019-05-20 LAB
C COLI+JEJUNI+LARI FUSA STL QL NAA+PROBE: NOT DETECTED
C DIFF TOX B STL QL: NEGATIVE
EC STX1 GENE STL QL NAA+PROBE: NOT DETECTED
EC STX2 GENE STL QL NAA+PROBE: NOT DETECTED
ENTERIC PATHOGEN COMMENT: NORMAL
NOROV GI+II ORF1-ORF2 JNC STL QL NAA+PR: NOT DETECTED
RVA NSP5 STL QL NAA+PROBE: NOT DETECTED
SALMONELLA SP RPOD STL QL NAA+PROBE: NOT DETECTED
SHIGELLA SP+EIEC IPAH STL QL NAA+PROBE: NOT DETECTED
SPECIMEN SOURCE: NORMAL
V CHOL+PARA RFBL+TRKH+TNAA STL QL NAA+PR: NOT DETECTED
Y ENTERO RECN STL QL NAA+PROBE: NOT DETECTED

## 2019-05-20 PROCEDURE — 87493 C DIFF AMPLIFIED PROBE: CPT | Performed by: PHYSICIAN ASSISTANT

## 2019-06-11 ENCOUNTER — ANCILLARY PROCEDURE (OUTPATIENT)
Dept: MAMMOGRAPHY | Facility: CLINIC | Age: 79
End: 2019-06-11
Attending: INTERNAL MEDICINE
Payer: COMMERCIAL

## 2019-06-11 DIAGNOSIS — Z12.31 VISIT FOR SCREENING MAMMOGRAM: ICD-10-CM

## 2019-06-11 DIAGNOSIS — Z12.31 ENCOUNTER FOR SCREENING MAMMOGRAM FOR BREAST CANCER: ICD-10-CM

## 2019-06-11 PROCEDURE — 77067 SCR MAMMO BI INCL CAD: CPT | Mod: TC

## 2020-01-20 ENCOUNTER — TELEPHONE (OUTPATIENT)
Dept: INTERNAL MEDICINE | Facility: CLINIC | Age: 80
End: 2020-01-20

## 2020-01-20 DIAGNOSIS — I10 ESSENTIAL HYPERTENSION: Primary | ICD-10-CM

## 2020-01-20 RX ORDER — IRBESARTAN 300 MG/1
300 TABLET ORAL AT BEDTIME
Qty: 90 TABLET | Refills: 1 | Status: SHIPPED | OUTPATIENT
Start: 2020-01-20 | End: 2020-01-20

## 2020-01-20 RX ORDER — LOSARTAN POTASSIUM 100 MG/1
100 TABLET ORAL DAILY
COMMUNITY
Start: 2019-10-20 | End: 2020-07-13

## 2020-01-20 NOTE — TELEPHONE ENCOUNTER
Stop Losartan if not available and start Irbesartan 300mg tab,1 tab daily which  would be equivalent in BP control. Rx faxed. Inform pt.   Pt to have fasting labs in late March as previously ordered and then see me a few days later to review results and blood pressure status with med change. Assist with scheduling future appts now if pt wishes

## 2020-01-20 NOTE — TELEPHONE ENCOUNTER
Spoke with patients , patient was able to get Losartan rx with [facility]. [facility] notified to cancel Irbesartan. Please add Losartan back to medication list. Patient is aware about labs and appointment due in March.

## 2020-02-05 NOTE — TELEPHONE ENCOUNTER
"""Informed patient that their cataract(s) are not visually significant or do not meet the criteria for "" Patient gt has a stress test on 7/10/17 wants to know if she should hold any medications prior to this test please call patient about this

## 2020-03-30 ENCOUNTER — DOCUMENTATION ONLY (OUTPATIENT)
Dept: LAB | Facility: CLINIC | Age: 80
End: 2020-03-30

## 2020-03-30 DIAGNOSIS — E78.5 HYPERLIPIDEMIA LDL GOAL <70: ICD-10-CM

## 2020-03-30 DIAGNOSIS — N18.30 TYPE 2 DIABETES MELLITUS WITH STAGE 3 CHRONIC KIDNEY DISEASE, WITHOUT LONG-TERM CURRENT USE OF INSULIN (H): ICD-10-CM

## 2020-03-30 DIAGNOSIS — J45.30 MILD PERSISTENT ASTHMA WITHOUT COMPLICATION: ICD-10-CM

## 2020-03-30 DIAGNOSIS — K21.9 GASTROESOPHAGEAL REFLUX DISEASE WITHOUT ESOPHAGITIS: ICD-10-CM

## 2020-03-30 DIAGNOSIS — E11.22 TYPE 2 DIABETES MELLITUS WITH STAGE 3 CHRONIC KIDNEY DISEASE, WITHOUT LONG-TERM CURRENT USE OF INSULIN (H): ICD-10-CM

## 2020-03-30 DIAGNOSIS — I10 ESSENTIAL HYPERTENSION: ICD-10-CM

## 2020-03-30 RX ORDER — ALBUTEROL SULFATE 90 UG/1
2 AEROSOL, METERED RESPIRATORY (INHALATION) EVERY 6 HOURS PRN
Qty: 18 G | Refills: 5 | Status: SHIPPED | OUTPATIENT
Start: 2020-03-30 | End: 2020-08-10

## 2020-03-30 RX ORDER — LABETALOL 100 MG/1
100 TABLET, FILM COATED ORAL 2 TIMES DAILY
Qty: 180 TABLET | Refills: 1 | Status: SHIPPED | OUTPATIENT
Start: 2020-03-30 | End: 2020-08-10

## 2020-03-30 RX ORDER — ROSUVASTATIN CALCIUM 40 MG/1
40 TABLET, COATED ORAL DAILY
Qty: 90 TABLET | Refills: 1 | Status: SHIPPED | OUTPATIENT
Start: 2020-03-30 | End: 2020-08-10

## 2020-03-30 RX ORDER — HYDRALAZINE HYDROCHLORIDE 50 MG/1
75 TABLET, FILM COATED ORAL 3 TIMES DAILY
Qty: 405 TABLET | Refills: 1 | Status: SHIPPED | OUTPATIENT
Start: 2020-03-30 | End: 2020-08-10

## 2020-03-30 RX ORDER — OMEPRAZOLE 40 MG/1
40 CAPSULE, DELAYED RELEASE ORAL DAILY
Qty: 90 CAPSULE | Refills: 1 | Status: SHIPPED | OUTPATIENT
Start: 2020-03-30 | End: 2020-08-10

## 2020-03-30 RX ORDER — DILTIAZEM HYDROCHLORIDE 360 MG/1
360 CAPSULE, EXTENDED RELEASE ORAL DAILY
Qty: 90 CAPSULE | Refills: 1 | Status: SHIPPED | OUTPATIENT
Start: 2020-03-30 | End: 2020-08-10

## 2020-03-30 NOTE — PROGRESS NOTES
If pt has any acute acute concerns, then do tele visit encounter. Otherwise, due to current Covid19 isolation issues,  pt to have fasting labs done in 3 mos and then see me a few days to review results and other medical issues.  Lab orders extended. Check blood sugars twice a day (before breakfast and bedtime) for 4 days prior to the appointment with me and bring the results to the appointment.

## 2020-03-30 NOTE — PROGRESS NOTES
PATIENT COMING IN FOR LABS [id number].    DOES PATIENT NEED LABS DONE NOW, OR SHOULD THEY RESCHEDULE?  THANK YOU.

## 2020-07-13 DIAGNOSIS — I10 ESSENTIAL HYPERTENSION: Primary | ICD-10-CM

## 2020-07-13 RX ORDER — LOSARTAN POTASSIUM 100 MG/1
100 TABLET ORAL DAILY
Qty: 30 TABLET | Refills: 0 | Status: SHIPPED | OUTPATIENT
Start: 2020-07-13 | End: 2020-08-07

## 2020-07-13 NOTE — TELEPHONE ENCOUNTER
Pt overdue for labs and f/u appt with MD gamboa: medical issues. Please call pt and schedule for the following appts     1. Fasting lab appt with [facility] lab in next 2 weeks. Explain concept of [facility] lab to pt and reducing risks for  exposures to other patients. For fasting labs, pt to  refrain from eating for 8 hours  prior to the lab but have pt drink 2 glasses of  water that morning and take usual medications the morning of the test.     2. Virtual visit appt with me the week of 7/27/20. If pt is able to do video visit, that is the preference. If does not have smartphone or computer  with camera or does not feel would be able to download and use reba for video visit, then schedule telephone visit instead    3. Check with pt to see if has a blood pressure monitor at home. If so, then pt to check blood pressure in AM and PM for 3 days prior to the appt with me. If does not have a home blood pressure monitor, then also schedule pt for a nurse-only blood pressure check in the [facility] at same time when has the lab appt so will have the blood pressure status  information when pt has virtual visit with me.    4. Check blood sugars twice a day (before breakfast and bedtime) for 4 days prior to the appointment with me, write them down and have them available to review with the appointment      Meds refilled for 30 days. Further refills will be addressed at future virtual visit.

## 2020-07-23 ENCOUNTER — OFFICE VISIT (OUTPATIENT)
Dept: NURSING | Facility: CLINIC | Age: 80
End: 2020-07-23
Payer: COMMERCIAL

## 2020-07-23 VITALS — HEART RATE: 68 BPM | OXYGEN SATURATION: 97 % | SYSTOLIC BLOOD PRESSURE: 117 MMHG | DIASTOLIC BLOOD PRESSURE: 66 MMHG

## 2020-07-23 DIAGNOSIS — N18.30 CKD (CHRONIC KIDNEY DISEASE) STAGE 3, GFR 30-59 ML/MIN (H): ICD-10-CM

## 2020-07-23 DIAGNOSIS — Z01.30 BP CHECK: Primary | ICD-10-CM

## 2020-07-23 DIAGNOSIS — N18.30 TYPE 2 DIABETES MELLITUS WITH STAGE 3 CHRONIC KIDNEY DISEASE, WITHOUT LONG-TERM CURRENT USE OF INSULIN (H): ICD-10-CM

## 2020-07-23 DIAGNOSIS — E11.22 TYPE 2 DIABETES MELLITUS WITH STAGE 3 CHRONIC KIDNEY DISEASE, WITHOUT LONG-TERM CURRENT USE OF INSULIN (H): ICD-10-CM

## 2020-07-23 DIAGNOSIS — E78.5 HYPERLIPIDEMIA LDL GOAL <70: ICD-10-CM

## 2020-07-23 LAB
ALBUMIN SERPL-MCNC: 3.4 G/DL (ref 3.4–5)
ALP SERPL-CCNC: 73 U/L (ref 40–150)
ALT SERPL W P-5'-P-CCNC: 18 U/L (ref 0–50)
ANION GAP SERPL CALCULATED.3IONS-SCNC: 7 MMOL/L (ref 3–14)
AST SERPL W P-5'-P-CCNC: 14 U/L (ref 0–45)
BILIRUB SERPL-MCNC: 0.5 MG/DL (ref 0.2–1.3)
BUN SERPL-MCNC: 18 MG/DL (ref 7–30)
CALCIUM SERPL-MCNC: 8.2 MG/DL (ref 8.5–10.1)
CHLORIDE SERPL-SCNC: 110 MMOL/L (ref 94–109)
CHOLEST SERPL-MCNC: 182 MG/DL
CO2 SERPL-SCNC: 23 MMOL/L (ref 20–32)
CREAT SERPL-MCNC: 0.95 MG/DL (ref 0.52–1.04)
GFR SERPL CREATININE-BSD FRML MDRD: 57 ML/MIN/{1.73_M2}
GLUCOSE SERPL-MCNC: 168 MG/DL (ref 70–99)
HBA1C MFR BLD: 6.9 % (ref 0–5.6)
HDLC SERPL-MCNC: 63 MG/DL
HGB BLD-MCNC: 13 G/DL (ref 11.7–15.7)
LDLC SERPL CALC-MCNC: 91 MG/DL
NONHDLC SERPL-MCNC: 119 MG/DL
POTASSIUM SERPL-SCNC: 4.1 MMOL/L (ref 3.4–5.3)
PROT SERPL-MCNC: 6.6 G/DL (ref 6.8–8.8)
SODIUM SERPL-SCNC: 140 MMOL/L (ref 133–144)
TRIGL SERPL-MCNC: 140 MG/DL

## 2020-07-23 PROCEDURE — 36415 COLL VENOUS BLD VENIPUNCTURE: CPT | Performed by: INTERNAL MEDICINE

## 2020-07-23 PROCEDURE — 99207 ZZC NO CHARGE NURSE ONLY: CPT

## 2020-07-23 PROCEDURE — 80061 LIPID PANEL: CPT | Performed by: INTERNAL MEDICINE

## 2020-07-23 PROCEDURE — 83036 HEMOGLOBIN GLYCOSYLATED A1C: CPT | Performed by: INTERNAL MEDICINE

## 2020-07-23 PROCEDURE — 85018 HEMOGLOBIN: CPT | Performed by: INTERNAL MEDICINE

## 2020-07-23 PROCEDURE — 80053 COMPREHEN METABOLIC PANEL: CPT | Performed by: INTERNAL MEDICINE

## 2020-07-23 NOTE — PROGRESS NOTES
I met with Cristela Salgado at the request of Dr. Snow to recheck her blood pressure.  Blood pressure medications on the med list were reviewed with patient.    Patient has taken all medications as per usual regimen: yes  Patient reports tolerating them without any issues or concerns: yes    There were no vitals filed for this visit.    Blood pressure was taken, previous encounter was reviewed, recorded blood pressure below 140/90. Patient was discharged and the note will be sent to the provider for final review.

## 2020-08-07 DIAGNOSIS — I10 ESSENTIAL HYPERTENSION: ICD-10-CM

## 2020-08-07 RX ORDER — LOSARTAN POTASSIUM 100 MG/1
TABLET ORAL
Qty: 30 TABLET | Refills: 0 | Status: SHIPPED | OUTPATIENT
Start: 2020-08-07 | End: 2020-08-10

## 2020-08-10 ENCOUNTER — VIRTUAL VISIT (OUTPATIENT)
Dept: INTERNAL MEDICINE | Facility: CLINIC | Age: 80
End: 2020-08-10
Payer: COMMERCIAL

## 2020-08-10 DIAGNOSIS — K21.9 GASTROESOPHAGEAL REFLUX DISEASE WITHOUT ESOPHAGITIS: ICD-10-CM

## 2020-08-10 DIAGNOSIS — J45.30 MILD PERSISTENT ASTHMA WITHOUT COMPLICATION: ICD-10-CM

## 2020-08-10 DIAGNOSIS — E78.5 HYPERLIPIDEMIA LDL GOAL <70: ICD-10-CM

## 2020-08-10 DIAGNOSIS — N18.30 CKD (CHRONIC KIDNEY DISEASE) STAGE 3, GFR 30-59 ML/MIN (H): ICD-10-CM

## 2020-08-10 DIAGNOSIS — E83.51 HYPOCALCEMIA: ICD-10-CM

## 2020-08-10 DIAGNOSIS — I10 ESSENTIAL HYPERTENSION: ICD-10-CM

## 2020-08-10 DIAGNOSIS — Z12.31 ENCOUNTER FOR SCREENING MAMMOGRAM FOR BREAST CANCER: ICD-10-CM

## 2020-08-10 DIAGNOSIS — E11.22 TYPE 2 DIABETES MELLITUS WITH STAGE 3 CHRONIC KIDNEY DISEASE, WITHOUT LONG-TERM CURRENT USE OF INSULIN (H): ICD-10-CM

## 2020-08-10 DIAGNOSIS — N18.30 TYPE 2 DIABETES MELLITUS WITH STAGE 3 CHRONIC KIDNEY DISEASE, WITHOUT LONG-TERM CURRENT USE OF INSULIN (H): ICD-10-CM

## 2020-08-10 DIAGNOSIS — H26.9 CATARACT OF BOTH EYES, UNSPECIFIED CATARACT TYPE: ICD-10-CM

## 2020-08-10 PROCEDURE — 99214 OFFICE O/P EST MOD 30 MIN: CPT | Mod: 95 | Performed by: INTERNAL MEDICINE

## 2020-08-10 RX ORDER — ROSUVASTATIN CALCIUM 40 MG/1
40 TABLET, COATED ORAL DAILY
Qty: 90 TABLET | Refills: 3 | Status: SHIPPED | OUTPATIENT
Start: 2020-08-10 | End: 2021-11-24

## 2020-08-10 RX ORDER — OMEPRAZOLE 40 MG/1
40 CAPSULE, DELAYED RELEASE ORAL DAILY
Qty: 90 CAPSULE | Refills: 3 | Status: SHIPPED | OUTPATIENT
Start: 2020-08-10 | End: 2022-01-12

## 2020-08-10 RX ORDER — LABETALOL 100 MG/1
100 TABLET, FILM COATED ORAL 2 TIMES DAILY
Qty: 180 TABLET | Refills: 3 | Status: SHIPPED | OUTPATIENT
Start: 2020-08-10 | End: 2021-08-06

## 2020-08-10 RX ORDER — LOSARTAN POTASSIUM 100 MG/1
100 TABLET ORAL DAILY
Qty: 90 TABLET | Refills: 3 | Status: SHIPPED | OUTPATIENT
Start: 2020-08-10 | End: 2021-08-06

## 2020-08-10 RX ORDER — ALBUTEROL SULFATE 90 UG/1
2 AEROSOL, METERED RESPIRATORY (INHALATION) EVERY 6 HOURS PRN
Qty: 18 G | Refills: 11 | Status: SHIPPED | OUTPATIENT
Start: 2020-08-10 | End: 2024-03-06

## 2020-08-10 RX ORDER — DILTIAZEM HYDROCHLORIDE 360 MG/1
360 CAPSULE, EXTENDED RELEASE ORAL DAILY
Qty: 90 CAPSULE | Refills: 3 | Status: SHIPPED | OUTPATIENT
Start: 2020-08-10 | End: 2021-11-05

## 2020-08-10 RX ORDER — HYDRALAZINE HYDROCHLORIDE 50 MG/1
75 TABLET, FILM COATED ORAL 3 TIMES DAILY
Qty: 405 TABLET | Refills: 3 | Status: SHIPPED | OUTPATIENT
Start: 2020-08-10 | End: 2021-09-09

## 2020-08-10 NOTE — PATIENT INSTRUCTIONS
Continue current meds  Prescriptions refilled on file at pharmacy to fill in future when needed.    Call  [phone] or use [employer] to schedule a future lab appointment  non-fasting in 6 months.   Schedule a follow up appointment with me in clinic a few days after these future labs are drawn to review results and other medical issues as necessary and for brief exam  Mammogram. May be scheduled to be done at the [facility]  I would recommend you receive an influenza (flu) vaccine  this Fall (September or October)  Schedule future preop appointment within 30 days of both future cataract surgery dates if possible so as to only have to done one preop appt

## 2020-08-10 NOTE — PROGRESS NOTES
"Cristela Salgado is a 79 year old female who is being evaluated via a billable telephone visit.      The patient has been notified of following:     \"This telephone visit will be conducted via a call between you and your physician/provider. We have found that certain health care needs can be provided without the need for a physical exam.  This service lets us provide the care you need with a short phone conversation.  If a prescription is necessary we can send it directly to your pharmacy.  If lab work is needed we can place an order for that and you can then stop by our lab to have the test done at a later time.    Telephone visits are billed at different rates depending on your insurance coverage. During this emergency period, for some insurers they may be billed the same as an in-person visit.  Please reach out to your insurance provider with any questions.    If during the course of the call the physician/provider feels a telephone visit is not appropriate, you will not be charged for this service.\"    Patient has given verbal consent for Telephone visit?  Yes    What phone number would you like to be contacted at? [phone]    How would you like to obtain your AVS? Mail a copy    Subjective     Cristela Salgado is a 79 year old female who presents via phone visit today for the following health issues:    HPI    Review lab results     Due to the current impact of the Covid19 virus and recommendations by [facility] administration, CDC, etc to limit  clinic visits and pt exposure risk, was recommend pt proceed with virtual phone visit to address acute/stable  medical needs with plan to defer other non-acute health maintenance issues/exam to a future date when less self-isolation is required.    I have reviewed the nursing note as documented above.   See below for other information/data  and my personal notes capturing the substance of my conversation with the patient.       HPI:      Patient stopped metformin about 6 " months ago due to loose stools.  Has improved her diet significantly and exercising more and controlling diabetes with diet only currently.  Patient states her weight is down. Denies CP, SOB, abdominal pain, polyuria, polydipsia,  extremity numbness/parasthesias or skin problems.  Due for breast cancer screening.  Denies breast pain  Has bilateral cataracts and considering surgery later in November.  Only minimal reduction in acuity currently  Asthma well controlled with Advair.  Occasional use of albuterol.  No recent coughing. ACT = 24      Component      Latest Ref Rng & Units 4/12/2019 5/17/2019 7/23/2020   Sodium      133 - 144 mmol/L 140 141 140   Potassium      3.4 - 5.3 mmol/L 3.8 3.7 4.1   Chloride      94 - 109 mmol/L 110 (H) 111 (H) 110 (H)   Carbon Dioxide      20 - 32 mmol/L 24 22 23   Anion Gap      3 - 14 mmol/L 6 8 7   Glucose      70 - 99 mg/dL 157 (H) 153 (H) 168 (H)   Urea Nitrogen      7 - 30 mg/dL 13 12 18   Creatinine      0.52 - 1.04 mg/dL 1.04 1.06 (H) 0.95   GFR Estimate      >60 mL/min/1.73:m2 52 (L) 50 (L) 57 (L)   GFR Estimate If Black      >60 mL/min/1.73:m2 60 (L) 58 (L) 66   Calcium      8.5 - 10.1 mg/dL 9.0 9.1 8.2 (L)   Bilirubin Total      0.2 - 1.3 mg/dL 0.6 0.5 0.5   Albumin      3.4 - 5.0 g/dL 3.5 3.4 3.4   Protein Total      6.8 - 8.8 g/dL 6.6 (L) 6.9 6.6 (L)   Alkaline Phosphatase      40 - 150 U/L 88 82 73   ALT      0 - 50 U/L 17 16 18   AST      0 - 45 U/L 12 9 14   Cholesterol      <200 mg/dL 190  182   Triglycerides      <150 mg/dL 143  140   HDL Cholesterol      >49 mg/dL 62  63   LDL Cholesterol Calculated      <100 mg/dL 99  91   Non HDL Cholesterol      <130 mg/dL 128  119   Creatinine Urine      mg/dL 187     Albumin Urine mg/L      mg/L 22     Albumin Urine mg/g Cr      0 - 25 mg/g Cr 11.71     TSH      0.40 - 4.00 mU/L 4.17 (H)     Hemoglobin A1C      0 - 5.6 % 7.1 (H)  6.9 (H)   T4 Free      0.76 - 1.46 ng/dL 0.99     Hemoglobin      11.7 - 15.7 g/dL   13.0         Additional ROS:   Constitutional, HEENT, Cardiovascular, Pulmonary, GI and , Neuro, MSK and Psych review of systems/symptoms are otherwise negative or unchanged from previous, except as noted above.      ASSESSMENT:    1. Type 2 diabetes mellitus with stage 3 chronic kidney disease, without long-term current use of insulin (H)  Controlled with diet therapy.  Had side effects with metformin.  Continue diet and exercise repeat lab 6 months  - Hemoglobin A1c; Future  - Basic metabolic panel; Future  - Albumin Random Urine Quantitative with Creat Ratio; Future    2. CKD (chronic kidney disease) stage 3, GFR 30-59 ml/min (H)  Improved.  Recheck labs 6 months  - Basic metabolic panel; Future  - Comprehensive metabolic panel; Future  - Albumin Random Urine Quantitative with Creat Ratio; Future    3. Hypocalcemia  Minimal.  Recheck with future lab  - Basic metabolic panel; Future  - Vitamin D Deficiency; Future  - Parathyroid Hormone Intact; Future    4. Essential hypertension  Recent nurse blood pressure check at goal <  130/80.  Continue current medication  - diltiazem ER COATED BEADS (CARDIZEM CD) 360 MG 24 hr capsule; Take 1 capsule (360 mg) by mouth daily  Dispense: 90 capsule; Refill: 3  - hydrALAZINE (APRESOLINE) 50 MG tablet; Take 1.5 tablets (75 mg) by mouth 3 times daily  Dispense: 405 tablet; Refill: 3  - labetalol (NORMODYNE) 100 MG tablet; Take 1 tablet (100 mg) by mouth 2 times daily  Dispense: 180 tablet; Refill: 3  - losartan (COZAAR) 100 MG tablet; Take 1 tablet (100 mg) by mouth daily  Dispense: 90 tablet; Refill: 3    5. Mild persistent asthma without complication  Controlled.  Continue current medication. ACT = 24  - fluticasone-salmeterol (ADVAIR DISKUS) 500-50 MCG/DOSE inhaler; INHALE 1 PUFF INTO THE LUNGS EVERY 12 HOURS  Dispense: 3 Inhaler; Refill: 3  - albuterol (PROAIR HFA/PROVENTIL HFA/VENTOLIN HFA) 108 (90 Base) MCG/ACT inhaler; Inhale 2 puffs into the lungs every 6 hours as needed for  shortness of breath / dyspnea or wheezing  Dispense: 18 g; Refill: 11    6. Hyperlipidemia LDL goal <70  Controlled.  Continue current medication.  Repeat lab 1 year  - rosuvastatin (CRESTOR) 40 MG tablet; Take 1 tablet (40 mg) by mouth daily  Dispense: 90 tablet; Refill: 3  - Lipid panel reflex to direct LDL Fasting; Future  - Comprehensive metabolic panel; Future    7. Gastroesophageal reflux disease without esophagitis  Patient states symptoms well controlled but has recurrence off of medication.  Continue PPI therapy  - omeprazole (PRILOSEC) 40 MG DR capsule; Take 1 capsule (40 mg) by mouth daily  Dispense: 90 capsule; Refill: 3    8. Encounter for screening mammogram for breast cancer  Due for mammogram screening.  Patient to schedule appointment  - *MA Screening Digital Bilateral; Future    9. Cataract of both eyes, unspecified cataract type  Future surgery later this year plan per patient.  Will schedule preop exam within 30 days of both surgeries      PLAN:  Continue current meds  Prescriptions refilled on file at pharmacy to fill in future when needed.    Call  [phone] or use [employer] to schedule a future lab appointment  non-fasting in 6 months.   Schedule a follow up appointment with me in clinic a few days after these future labs are drawn to review results and other medical issues as necessary and for brief exam  Mammogram. May be scheduled to be done at the [facility]  I would recommend you receive an influenza (flu) vaccine  this Fall (September or October)  Schedule future preop appointment within 30 days of both future cataract surgery dates if possible so as to only have to done one preop appt  Discussed Shingrix.  Patient declines at this time      Phone call contact time  Call Started at 2:22pm  Call Ended at 2:36 pm  Total minutes: 14 min    (Chart documentation was completed, in part, with [employer] voice-recognition software. Even though reviewed, some grammatical, spelling,  and word errors may remain.)    Jose De Jesus Snow MD  Internal Medicine Department  [facility]

## 2020-08-11 ASSESSMENT — ASTHMA QUESTIONNAIRES: ACT_TOTALSCORE: 24

## 2020-09-08 ENCOUNTER — TELEPHONE (OUTPATIENT)
Dept: INTERNAL MEDICINE | Facility: CLINIC | Age: 80
End: 2020-09-08

## 2020-09-08 NOTE — TELEPHONE ENCOUNTER
Patient Quality Outreach      Summary:    Patient is due/failing the following:   Eye Exam and Microalbumin, AAP, Annual wellness, date due: 04/16/2020 and Immunizations    Type of outreach:    Sent letter.    Questions for provider review:    None                                                                                   **Start Working phrase here:**       Patient has the following on her problem list/HM:     Asthma review       ACT Total Scores 8/10/2020   ACT TOTAL SCORE -   ASTHMA ER VISITS -   ASTHMA HOSPITALIZATIONS -   ACT TOTAL SCORE (Goal Greater than or Equal to 20) 24   In the past 12 months, how many times did you visit the emergency room for your asthma without being admitted to the hospital? 0   In the past 12 months, how many times were you hospitalized overnight because of your asthma? 0          Immunizations       Health Maintenance Due   Topic     Hepatitis B Vaccine (1 of 3 - Risk 3-dose series)     Flu Vaccine (1)                                                     Karen Blair [facility]       Chart routed to none.

## 2020-09-08 NOTE — LETTER
[facility]  [address] [place] [address]  [phone]  September 8, 2020    Cristela Salgado  [address]  [facility] [address]    Dear Cristela,    We care about your health and based on a review of your medical records, recommend the the following, to better manage your health:      You are in particular need of attention regarding:  -Wellness (Physical) Visit     I am recommending that you:     -schedule a WELLNESS (Physical) APPOINTMENT with me.         Please call us at [phone] or [phone] (or use [employer]) to address the above recommendations.     Thank you for trusting [facility].  We appreciate the opportunity to serve you and look forward to supporting your healthcare needs in the future.    If you have (or plan to have) any of these tests done at a facility other than a [facility] or a [facility], please have the results from these tests sent to your primary physician at [facility].    Healthy Regards,    Jose De Jesus Snow MD/Karen Blair, [facility]

## 2021-02-01 DIAGNOSIS — E83.51 HYPOCALCEMIA: ICD-10-CM

## 2021-02-01 DIAGNOSIS — N18.30 TYPE 2 DIABETES MELLITUS WITH STAGE 3 CHRONIC KIDNEY DISEASE, WITHOUT LONG-TERM CURRENT USE OF INSULIN (H): ICD-10-CM

## 2021-02-01 DIAGNOSIS — N18.30 CKD (CHRONIC KIDNEY DISEASE) STAGE 3, GFR 30-59 ML/MIN (H): ICD-10-CM

## 2021-02-01 DIAGNOSIS — E11.22 TYPE 2 DIABETES MELLITUS WITH STAGE 3 CHRONIC KIDNEY DISEASE, WITHOUT LONG-TERM CURRENT USE OF INSULIN (H): ICD-10-CM

## 2021-02-01 LAB
ANION GAP SERPL CALCULATED.3IONS-SCNC: 4 MMOL/L (ref 3–14)
BUN SERPL-MCNC: 15 MG/DL (ref 7–30)
CALCIUM SERPL-MCNC: 9.1 MG/DL (ref 8.5–10.1)
CHLORIDE SERPL-SCNC: 106 MMOL/L (ref 94–109)
CO2 SERPL-SCNC: 27 MMOL/L (ref 20–32)
CREAT SERPL-MCNC: 1.09 MG/DL (ref 0.52–1.04)
DEPRECATED CALCIDIOL+CALCIFEROL SERPL-MC: 7 UG/L (ref 20–75)
GFR SERPL CREATININE-BSD FRML MDRD: 48 ML/MIN/{1.73_M2}
GLUCOSE SERPL-MCNC: 147 MG/DL (ref 70–99)
HBA1C MFR BLD: 6.9 % (ref 0–5.6)
POTASSIUM SERPL-SCNC: 3.9 MMOL/L (ref 3.4–5.3)
PTH-INTACT SERPL-MCNC: 70 PG/ML (ref 18–80)
SODIUM SERPL-SCNC: 137 MMOL/L (ref 133–144)

## 2021-02-01 PROCEDURE — 83036 HEMOGLOBIN GLYCOSYLATED A1C: CPT | Performed by: INTERNAL MEDICINE

## 2021-02-01 PROCEDURE — 36415 COLL VENOUS BLD VENIPUNCTURE: CPT | Performed by: INTERNAL MEDICINE

## 2021-02-01 PROCEDURE — 80048 BASIC METABOLIC PNL TOTAL CA: CPT | Performed by: INTERNAL MEDICINE

## 2021-02-01 PROCEDURE — 82306 VITAMIN D 25 HYDROXY: CPT | Performed by: INTERNAL MEDICINE

## 2021-02-01 PROCEDURE — 83970 ASSAY OF PARATHORMONE: CPT | Performed by: INTERNAL MEDICINE

## 2021-02-02 NOTE — RESULT ENCOUNTER NOTE
Results reviewed. Will discuss them soon with pt at upcoming scheduled appt. See clinic note from that visit for future plan 2/8/21

## 2021-02-08 ENCOUNTER — OFFICE VISIT (OUTPATIENT)
Dept: INTERNAL MEDICINE | Facility: CLINIC | Age: 81
End: 2021-02-08
Payer: COMMERCIAL

## 2021-02-08 VITALS
WEIGHT: 133 LBS | TEMPERATURE: 97.2 F | DIASTOLIC BLOOD PRESSURE: 78 MMHG | RESPIRATION RATE: 16 BRPM | BODY MASS INDEX: 23.57 KG/M2 | OXYGEN SATURATION: 100 % | SYSTOLIC BLOOD PRESSURE: 116 MMHG | HEIGHT: 63 IN | HEART RATE: 74 BPM

## 2021-02-08 DIAGNOSIS — R01.1 HEART MURMUR: ICD-10-CM

## 2021-02-08 DIAGNOSIS — N18.31 STAGE 3A CHRONIC KIDNEY DISEASE (H): ICD-10-CM

## 2021-02-08 DIAGNOSIS — Z13.820 SCREENING FOR OSTEOPOROSIS: ICD-10-CM

## 2021-02-08 DIAGNOSIS — E55.9 VITAMIN D DEFICIENCY: ICD-10-CM

## 2021-02-08 DIAGNOSIS — N18.31 TYPE 2 DIABETES MELLITUS WITH STAGE 3A CHRONIC KIDNEY DISEASE, WITHOUT LONG-TERM CURRENT USE OF INSULIN (H): ICD-10-CM

## 2021-02-08 DIAGNOSIS — E11.22 TYPE 2 DIABETES MELLITUS WITH STAGE 3A CHRONIC KIDNEY DISEASE, WITHOUT LONG-TERM CURRENT USE OF INSULIN (H): ICD-10-CM

## 2021-02-08 DIAGNOSIS — Z12.31 ENCOUNTER FOR SCREENING MAMMOGRAM FOR BREAST CANCER: ICD-10-CM

## 2021-02-08 DIAGNOSIS — E78.5 HYPERLIPIDEMIA LDL GOAL <70: ICD-10-CM

## 2021-02-08 PROCEDURE — 99214 OFFICE O/P EST MOD 30 MIN: CPT | Performed by: INTERNAL MEDICINE

## 2021-02-08 RX ORDER — CHOLECALCIFEROL (VITAMIN D3) 50 MCG
1 TABLET ORAL DAILY
Start: 2021-02-08

## 2021-02-08 ASSESSMENT — ASTHMA QUESTIONNAIRES
QUESTION_4 LAST FOUR WEEKS HOW OFTEN HAVE YOU USED YOUR RESCUE INHALER OR NEBULIZER MEDICATION (SUCH AS ALBUTEROL): TWO OR THREE TIMES PER WEEK
QUESTION_2 LAST FOUR WEEKS HOW OFTEN HAVE YOU HAD SHORTNESS OF BREATH: ONCE OR TWICE A WEEK
QUESTION_1 LAST FOUR WEEKS HOW MUCH OF THE TIME DID YOUR ASTHMA KEEP YOU FROM GETTING AS MUCH DONE AT WORK, SCHOOL OR AT HOME: NONE OF THE TIME
QUESTION_5 LAST FOUR WEEKS HOW WOULD YOU RATE YOUR ASTHMA CONTROL: WELL CONTROLLED
ACT_TOTALSCORE: 21
QUESTION_3 LAST FOUR WEEKS HOW OFTEN DID YOUR ASTHMA SYMPTOMS (WHEEZING, COUGHING, SHORTNESS OF BREATH, CHEST TIGHTNESS OR PAIN) WAKE YOU UP AT NIGHT OR EARLIER THAN USUAL IN THE MORNING: NOT AT ALL

## 2021-02-08 ASSESSMENT — MIFFLIN-ST. JEOR: SCORE: 1039.47

## 2021-02-08 NOTE — PATIENT INSTRUCTIONS
Continue current meds  Call  [phone] or use [employer] to schedule a future lab appointment  fasting in 3 months.   For fasting labs, please refrain from eating for 8 hours or more.   Drink 2 glasses of water before your lab appointment. It is fine to take your  oral medications on the morning of the lab test as usual  Would recommend you receive a coronavirus/COVID-19 vaccination when it becomes available. [phone] or [employer]  Please start Vitamin D3 (2,000 IU tablet), 2 tab daily for 1 month, then 1 tab daily. You can purchase this over the counter at any pharmacy.   Possible future cataract surgery. Need  preop within 30 days of surgeries   ECHO of the heart at  [facility] cardiology for heart murmur. Call [phone] to schedule appt with heart clinic

## 2021-02-08 NOTE — PROGRESS NOTES
Assessment & Plan     ASSESSMENT:   1. Type 2 diabetes mellitus with stage 3a chronic kidney disease, without long-term current use of insulin (H)  Controlled. Continue diet therapy. Repeat labs 3 months  - Hemoglobin A1c; Future  - Comprehensive metabolic panel; Future  - Albumin Random Urine Quantitative with Creat Ratio; Future    2. Stage 3a chronic kidney disease  Slight worsening. Patient encouraged to increase water intake. Recheck labs 3 months  - Comprehensive metabolic panel; Future    3. Hyperlipidemia LDL goal <70  On high-dose statin therapy. Lipids previously at goal except for LDL 91. Continue current medication for now. Repeat labs 3 months. If LDL remains above goal, will consider adding Zetia  - Lipid panel reflex to direct LDL Fasting; Future  - Comprehensive metabolic panel; Future    4. Vitamin D deficiency  Vitamin D quite low. We will start vitamin D supplementation as below with repeat lab 3 months  - vitamin D3 (CHOLECALCIFEROL) 50 mcg (2000 units) tablet; Take 1 tablet (50 mcg) by mouth daily  Dispense:    - Vitamin D Deficiency; Future    5. Heart murmur  Known tricuspid regurgitation. Possible aortic stenosis/sclerosis based on right upper sternal border murmur. Patient to have echo to assess valves further  - Echocardiogram Complete; Future    6. Encounter for screening mammogram for breast cancer  - MA Screening Digital Bilateral; Future    7. Screening for osteoporosis  - DX Hip/Pelvis/Spine; Future      PLAN:  Continue current meds  Call  [phone] or use [email or web address] to schedule a future lab appointment  fasting in 3 months.   For fasting labs, please refrain from eating for 8 hours or more.   Drink 2 glasses of water before your lab appointment. It is fine to take your  oral medications on the morning of the lab test as usual  Would recommend you receive a coronavirus/COVID-19 vaccination when it becomes available. [phone] or [email or web address]  Please start Vitamin D3 (2,000 IU tablet),  2 tab daily for 1 month, then 1 tab daily. You can purchase this over the counter at any pharmacy.   Possible future cataract surgery. Need  preop within 30 days of surgeries   ECHO of the heart at  [facility] cardiology. Call [phone] to schedule appt with heart clinic  Mammogram and Bone density test - schedule at [facility]              (Chart documentation was completed, in part, with [employer] voice-recognition software. Even though reviewed, some grammatical, spelling, and word errors may remain.)    Jose De Jesus Snow MD  Internal Medicine Department  [employer]      Wilton Archibald is a 80 year old who presents to clinic today for the following health issues  accompanied by herself:    HPI       Diabetes Follow-up    How often are you checking your blood sugar? A few times a week  What time of day are you checking your blood sugars (select all that apply)?  Varies  Have you had any blood sugars above 200?  No  Have you had any blood sugars below 70?  No    What symptoms do you notice when your blood sugar is low?  None    What concerns do you have today about your diabetes? None     Do you have any of these symptoms? (Select all that apply)  No numbness or tingling in feet.  No redness, sores or blisters on feet.  No complaints of excessive thirst.  No reports of blurry vision.  No significant changes to weight.    Have you had a diabetic eye exam in the last 12 months? No                Hyperlipidemia Follow-Up      Are you regularly taking any medication or supplement to lower your cholesterol?   Yes- Rosustatin    Are you having muscle aches or other side effects that you think could be caused by your cholesterol lowering medication?  No    Hypertension Follow-up      Do you check your blood pressure regularly outside of the clinic? No     Are you following a low salt diet? Yes    Are your blood pressures ever more than 140 on the top number (systolic) OR  more   than 90 on the bottom number (diastolic), for example 140/90? Unknown    BP Readings from Last 2 Encounters:   07/23/20 117/66   05/17/19 112/70     Hemoglobin A1C (%)   Date Value   02/01/2021 6.9 (H)   07/23/2020 6.9 (H)     LDL Cholesterol Calculated (mg/dL)   Date Value   07/23/2020 91   04/12/2019 99      Most recent lab results reviewed with pt.        Component      Latest Ref Rng & Units 5/17/2019 7/23/2020 2/1/2021   Sodium      133 - 144 mmol/L 141 140 137   Potassium      3.4 - 5.3 mmol/L 3.7 4.1 3.9   Chloride      94 - 109 mmol/L 111 (H) 110 (H) 106   Carbon Dioxide      20 - 32 mmol/L 22 23 27   Anion Gap      3 - 14 mmol/L 8 7 4   Glucose      70 - 99 mg/dL 153 (H) 168 (H) 147 (H)   Urea Nitrogen      7 - 30 mg/dL 12 18 15   Creatinine      0.52 - 1.04 mg/dL 1.06 (H) 0.95 1.09 (H)   GFR Estimate      >60 mL/min/1.73:m2 50 (L) 57 (L) 48 (L)   GFR Estimate If Black      >60 mL/min/1.73:m2 58 (L) 66 56 (L)   Calcium      8.5 - 10.1 mg/dL 9.1 8.2 (L) 9.1   Bilirubin Total      0.2 - 1.3 mg/dL 0.5 0.5    Albumin      3.4 - 5.0 g/dL 3.4 3.4    Protein Total      6.8 - 8.8 g/dL 6.9 6.6 (L)    Alkaline Phosphatase      40 - 150 U/L 82 73    ALT      0 - 50 U/L 16 18    AST      0 - 45 U/L 9 14    Cholesterol      <200 mg/dL  182    Triglycerides      <150 mg/dL  140    HDL Cholesterol      >49 mg/dL  63    LDL Cholesterol Calculated      <100 mg/dL  91    Non HDL Cholesterol      <130 mg/dL  119    Hemoglobin      11.7 - 15.7 g/dL  13.0    Hemoglobin A1C      0 - 5.6 %  6.9 (H) 6.9 (H)   Parathyroid Hormone Intact      18 - 80 pg/mL   70   Vitamin D Deficiency screening      20 - 75 ug/L   7 (L)       Denies CP, SOB with inhaler therapy, abdominal pain, polyuria, polydipsia,  extremity numbness/parasthesias or skin problems. Has bilateral cataracts and right worse than left. Thinking about future surgery  Has  not been checking sugars   Due for mammogram and bone density assessment. Denies breast  "issues. Last DEXA 2010 and normal then         Additional ROS:   Constitutional, HEENT, Cardiovascular, Pulmonary, GI and , Neuro, MSK and Psych review of systems/symptoms are otherwise negative or unchanged from previous, except as noted above.      OBJECTIVE:  /78   Pulse 74   Temp 97.2  F (36.2  C) (Temporal)   Resp 16   Ht 1.588 m (5' 2.5\")   Wt 60.3 kg (133 lb)   SpO2 100%   BMI 23.94 kg/m     Estimated body mass index is 23.94 kg/m  as calculated from the following:    Height as of this encounter: 1.588 m (5' 2.5\").    Weight as of this encounter: 60.3 kg (133 lb).  Eye: PERRL, EOMI. Faint bilateral cataracts noted with nondilated eye exam  Neck: no adenopathy. Thyroid normal to palpation. No bruits  Pulm: Lungs clear to auscultation   CV: Regular rates and rhythm 1/6 JANES RUSB and apex  GI: Soft, nontender, Normal active bowel sounds, No hepatosplenomegaly or masses palpable  Ext: Peripheral pulses intact. No edema.  Neuro: Normal strength and tone, sensory exam grossly normal         "

## 2021-02-09 ASSESSMENT — ASTHMA QUESTIONNAIRES: ACT_TOTALSCORE: 21

## 2021-02-15 ENCOUNTER — HOSPITAL ENCOUNTER (OUTPATIENT)
Dept: CARDIOLOGY | Facility: CLINIC | Age: 81
Discharge: HOME | End: 2021-02-15
Attending: INTERNAL MEDICINE | Admitting: INTERNAL MEDICINE
Payer: COMMERCIAL

## 2021-02-15 DIAGNOSIS — R01.1 HEART MURMUR: ICD-10-CM

## 2021-02-15 PROCEDURE — 93306 TTE W/DOPPLER COMPLETE: CPT | Mod: 26 | Performed by: INTERNAL MEDICINE

## 2021-02-15 PROCEDURE — 93306 TTE W/DOPPLER COMPLETE: CPT

## 2021-02-18 ENCOUNTER — TELEPHONE (OUTPATIENT)
Dept: INTERNAL MEDICINE | Facility: CLINIC | Age: 81
End: 2021-02-18

## 2021-02-18 NOTE — TELEPHONE ENCOUNTER
Pt calling for results of Echo from 2/15/2021.  Needs to be reviewed by PCP.  Please advise.  Pt can be reached at [phone].  OK to leave detailed message.  Ewa Pink

## 2021-02-20 NOTE — TELEPHONE ENCOUNTER
Spoke with patient and reviewed echo results.  Comparison is to 2011.  Ejection fraction remains normal.  Similar moderate tricuspid regurgitation.  Patient has new mild aortic stenosis and mild to moderate mitral stenosis.  On ARB therapy along with other blood pressure medications.  Blood pressure at goal.  We will repeat echo in 2 years

## 2021-02-24 ENCOUNTER — IMMUNIZATION (OUTPATIENT)
Dept: NURSING | Facility: CLINIC | Age: 81
End: 2021-02-24
Payer: COMMERCIAL

## 2021-02-24 PROCEDURE — 91300 PR COVID VAC PFIZER DIL RECON 30 MCG/0.3 ML IM: CPT

## 2021-02-24 PROCEDURE — 0001A PR COVID VAC PFIZER DIL RECON 30 MCG/0.3 ML IM: CPT

## 2021-03-01 ENCOUNTER — ANCILLARY PROCEDURE (OUTPATIENT)
Dept: BONE DENSITY | Facility: CLINIC | Age: 81
End: 2021-03-01
Attending: INTERNAL MEDICINE
Payer: COMMERCIAL

## 2021-03-01 ENCOUNTER — ANCILLARY PROCEDURE (OUTPATIENT)
Dept: MAMMOGRAPHY | Facility: CLINIC | Age: 81
End: 2021-03-01
Attending: INTERNAL MEDICINE
Payer: COMMERCIAL

## 2021-03-01 DIAGNOSIS — Z12.31 ENCOUNTER FOR SCREENING MAMMOGRAM FOR BREAST CANCER: ICD-10-CM

## 2021-03-01 DIAGNOSIS — Z12.31 VISIT FOR SCREENING MAMMOGRAM: ICD-10-CM

## 2021-03-01 DIAGNOSIS — Z13.820 SCREENING FOR OSTEOPOROSIS: ICD-10-CM

## 2021-03-01 PROCEDURE — 77067 SCR MAMMO BI INCL CAD: CPT | Mod: TC | Performed by: RADIOLOGY

## 2021-03-01 PROCEDURE — 77085 DXA BONE DENSITY AXL VRT FX: CPT | Performed by: INTERNAL MEDICINE

## 2021-03-17 ENCOUNTER — IMMUNIZATION (OUTPATIENT)
Dept: NURSING | Facility: CLINIC | Age: 81
End: 2021-03-17
Attending: INTERNAL MEDICINE
Payer: COMMERCIAL

## 2021-03-17 PROCEDURE — 91300 PR COVID VAC PFIZER DIL RECON 30 MCG/0.3 ML IM: CPT

## 2021-03-17 PROCEDURE — 0002A PR COVID VAC PFIZER DIL RECON 30 MCG/0.3 ML IM: CPT

## 2021-06-08 ENCOUNTER — OFFICE VISIT (OUTPATIENT)
Dept: INTERNAL MEDICINE | Facility: CLINIC | Age: 81
End: 2021-06-08
Payer: COMMERCIAL

## 2021-06-08 VITALS
TEMPERATURE: 97.6 F | BODY MASS INDEX: 23.57 KG/M2 | DIASTOLIC BLOOD PRESSURE: 64 MMHG | SYSTOLIC BLOOD PRESSURE: 130 MMHG | WEIGHT: 133 LBS | HEIGHT: 63 IN | RESPIRATION RATE: 16 BRPM | HEART RATE: 77 BPM | OXYGEN SATURATION: 98 %

## 2021-06-08 DIAGNOSIS — N18.31 TYPE 2 DIABETES MELLITUS WITH STAGE 3A CHRONIC KIDNEY DISEASE, WITHOUT LONG-TERM CURRENT USE OF INSULIN (H): ICD-10-CM

## 2021-06-08 DIAGNOSIS — Z01.818 PREOPERATIVE EXAMINATION: Primary | ICD-10-CM

## 2021-06-08 DIAGNOSIS — R01.1 HEART MURMUR: ICD-10-CM

## 2021-06-08 DIAGNOSIS — E11.22 TYPE 2 DIABETES MELLITUS WITH STAGE 3A CHRONIC KIDNEY DISEASE, WITHOUT LONG-TERM CURRENT USE OF INSULIN (H): ICD-10-CM

## 2021-06-08 DIAGNOSIS — I27.20 PULMONARY HYPERTENSION (H): ICD-10-CM

## 2021-06-08 DIAGNOSIS — E55.9 VITAMIN D DEFICIENCY: ICD-10-CM

## 2021-06-08 DIAGNOSIS — H26.9 CATARACT OF BOTH EYES, UNSPECIFIED CATARACT TYPE: ICD-10-CM

## 2021-06-08 DIAGNOSIS — E78.5 HYPERLIPIDEMIA LDL GOAL <70: ICD-10-CM

## 2021-06-08 DIAGNOSIS — N18.31 STAGE 3A CHRONIC KIDNEY DISEASE (H): ICD-10-CM

## 2021-06-08 DIAGNOSIS — I25.10 CORONARY ARTERY DISEASE INVOLVING NATIVE CORONARY ARTERY OF NATIVE HEART WITHOUT ANGINA PECTORIS: ICD-10-CM

## 2021-06-08 LAB
ALBUMIN SERPL-MCNC: 3.8 G/DL (ref 3.4–5)
ALP SERPL-CCNC: 84 U/L (ref 40–150)
ALT SERPL W P-5'-P-CCNC: 18 U/L (ref 0–50)
ANION GAP SERPL CALCULATED.3IONS-SCNC: 6 MMOL/L (ref 3–14)
AST SERPL W P-5'-P-CCNC: 15 U/L (ref 0–45)
BILIRUB SERPL-MCNC: 0.6 MG/DL (ref 0.2–1.3)
BUN SERPL-MCNC: 17 MG/DL (ref 7–30)
CALCIUM SERPL-MCNC: 8.9 MG/DL (ref 8.5–10.1)
CHLORIDE SERPL-SCNC: 103 MMOL/L (ref 94–109)
CO2 SERPL-SCNC: 26 MMOL/L (ref 20–32)
CREAT SERPL-MCNC: 1.23 MG/DL (ref 0.52–1.04)
GFR SERPL CREATININE-BSD FRML MDRD: 42 ML/MIN/{1.73_M2}
GLUCOSE SERPL-MCNC: 124 MG/DL (ref 70–99)
HBA1C MFR BLD: 6.7 % (ref 0–5.6)
POTASSIUM SERPL-SCNC: 4.1 MMOL/L (ref 3.4–5.3)
PROT SERPL-MCNC: 6.9 G/DL (ref 6.8–8.8)
SODIUM SERPL-SCNC: 135 MMOL/L (ref 133–144)

## 2021-06-08 PROCEDURE — 83036 HEMOGLOBIN GLYCOSYLATED A1C: CPT | Performed by: INTERNAL MEDICINE

## 2021-06-08 PROCEDURE — 80053 COMPREHEN METABOLIC PANEL: CPT | Performed by: INTERNAL MEDICINE

## 2021-06-08 PROCEDURE — 99214 OFFICE O/P EST MOD 30 MIN: CPT | Performed by: INTERNAL MEDICINE

## 2021-06-08 PROCEDURE — 82306 VITAMIN D 25 HYDROXY: CPT | Performed by: INTERNAL MEDICINE

## 2021-06-08 PROCEDURE — 36415 COLL VENOUS BLD VENIPUNCTURE: CPT | Performed by: INTERNAL MEDICINE

## 2021-06-08 ASSESSMENT — MIFFLIN-ST. JEOR: SCORE: 1039.47

## 2021-06-08 NOTE — PROGRESS NOTES
[address] [address]  Phone: [phone]  Primary Provider: Jose De Jesus Pineda  Pre-op Performing Provider: JOSE DE JESUS PINEDA      PREOPERATIVE EVALUATION:  Today's date: 6/8/2021    Cristela Salgado is a 80 year old female who presents for a preoperative evaluation.    Surgical Information:  Surgery/Procedure: Cataracts  Surgery Location: [facility] [place]  Surgeon: Dr Molly Baltazar  Surgery Date: 06/22/2021-Right Eye and 06/29/2021-Left Eye   Time of Surgery: TBD  Where patient plans to recover: At home with family  Fax number for surgical facility: [phone]    Type of Anesthesia Anticipated: MAC    Assessment & Plan     The proposed surgical procedure is considered LOW risk.    Preoperative examination  Patient appears medically stable to proceed with surgery as scheduled    Cataract of both eyes, unspecified cataract type    Stage 3a chronic kidney disease  Mild worsening. Will monitor  - Comprehensive metabolic panel    Type 2 diabetes mellitus with stage 3a chronic kidney disease, without long-term current use of insulin (H)  Controlled with diet therapy.  A1c at goal <7  - Comprehensive metabolic panel  - Hemoglobin A1c    Hyperlipidemia LDL goal <70  On statin therapy.   Prior LDL mildly above goal with history of CAD.  Has  future fasting lipid panel ordered  - Comprehensive metabolic panel    Vitamin D deficiency  Controlled with vitamin D supplementation  - Vitamin D Deficiency    Coronary artery disease involving native coronary artery of native heart without angina pectoris  Minimal nonobstructive coronary disease seen on prior angiogram.  Last stress test 2017 negative for ischemia.  Patient asymptomatic with exercise currently      Heart murmur  Mild aortic stenosis that radiates up into the left carotid artery area.  Good strong carotid pulse.  Patient also has moderate tricuspid vegetation mild to moderate mitral  regurgitation    Pulmonary HTN   Stable. Recent ECHO heart showed the right ventricular systolic pressure is approximated at 40.2 mmHg plus the right atrial pressure. Right  ventricular systolic pressure is elevated, consistent with mild pulmonary hypertension.      Risks and Recommendations:  The patient has the following additional risks and recommendations for perioperative complications:   - No identified additional risk factors other than previously addressed    Patient instructions:  Take usual PM medications the night before surgery   Take  Losartan, Hydralazine and  Labetalol the  AM of surgery with a small sip water. Otherwise nothing to eat/drink after midnight prior to surgery        RECOMMENDATION:  APPROVAL GIVEN to proceed with proposed procedure, without further diagnostic evaluation.         Subjective     HPI related to upcoming procedure:   Clouding of vision right eye. Minimal reduced acuity left. Found to have bilateral cataracts. Presents for clearance to undergo surgical correction. See  below for other medical issues. Stable asthma with ACT = 21.   Regarding exercise tolerance, walking 1 mile 2-3x/week without chest pain or SOB    1. No - Have you ever had a heart attack or stroke?  2. No - Have you ever had surgery on your heart or blood vessels, such as a stent, coronary (heart) bypass, or surgery on an artery in the head, neck, heart, or legs?  3. No - Do you have chest pain when you are physically active?  4. No - Do you have a history of heart failure?  5. No - Do you currently have a cold, bronchitis, or symptoms of other respiratory (head and chest) infections?  6. No - Do you have a cough, shortness of breath, or wheezing?  7. No - Do you or anyone in your family have a history of blood clots?  8. No - Do you or anyone in your family have a serious bleeding problem, such as long-lasting bleeding after surgeries or cuts?  9. No - Have you ever had anemia or been told to take iron  pills?  10. No - Have you had any abnormal blood loss such as black, tarry or bloody stools, or abnormal vaginal bleeding?  11. No - Have you ever had a blood transfusion?  12. Yes - Are you willing to have a blood transfusion if it is medically needed before, during, or after your surgery?  13. No - Have you or anyone in your family ever had problems with anesthesia (sedation for surgery)?  14. No - Do you have sleep apnea, excessive snoring, or daytime drowsiness?   15. No - Do you have any artifical heart valves or other implanted medical devices, such as a pacemaker, defibrillator, or continuous glucose monitor?  16. No - Do you have any artifical joints?  17. No - Are you allergic to latex?  18. No - Is there any chance that you may be pregnant?      Health Care Directive:  Patient has a Health Care Directive or Living Will:     Preoperative Review of :   reviewed - no record of controlled substances prescribed.      Status of Chronic Conditions:  ASTHMA - Patient has a longstanding history of moderate-severe Asthma . Patient has been doing well overall noting NO SYMPTOMS and continues on medication regimen consisting of  Advair without adverse reactions or side effects.  Very rare use of Albuterol. ACT = 21    HYPERLIPIDEMIA - Patient has a long history of significant Hyperlipidemia requiring medication for treatment with recent good control. Patient reports no problems or side effects with the medication.     HYPERTENSION - Patient has longstanding history of HTN , currently denies any symptoms referable to elevated blood pressure. Specifically denies chest pain, palpitations, dyspnea, orthopnea, PND or peripheral edema. Blood pressure readings have been in normal range. Current medication regimen is as listed below. Patient denies any side effects of medication.     AORTIC STENOSIS - mild along with moderate tricuspid regurgitation and mild to moderate mitral regurgitation    CAD - asymptomatic. Last  stress test heart done 2017 and negative for ischemia.  CT coronary angiography in 2007 showed only mild nonobstructive coronary artery disease presents     DIABETES -diet controlled.  Not checking blood sugars recently.  A1c today 6.7.  Prior A1c 6.9     CKD3a - stable    Review of Systems  CONSTITUTIONAL: NEGATIVE for fever, chills, change in weight  INTEGUMENTARY/SKIN: NEGATIVE for worrisome rashes, moles or lesions  EYES:   See HPI above  ENT/MOUTH: NEGATIVE for ear, mouth and throat problems. Rare nasal congestion  RESP: NEGATIVE for significant cough or SOB with meds. ACT = 21. Mild Pulm  HTN  BREAST: NEGATIVE for masses, tenderness or discharge  CV: NEGATIVE for chest pain, palpitations or peripheral edema. On BP meds  GI: NEGATIVE for nausea, abdominal pain, heartburn, or change in bowel habits  : NEGATIVE for frequency, dysuria, or hematuria. No vaginal sx. Hx CKD3a  MUSCULOSKELETAL: NEGATIVE for significant arthralgias or myalgia  NEURO: NEGATIVE for weakness, dizziness or paresthesias  ENDOCRINE: NEGATIVE for temperature intolerance. On statin therapy for lipids. Diet controlled diabetes  HEME: NEGATIVE for bleeding problems  PSYCHIATRIC: NEGATIVE for changes in mood or affect    Patient Active Problem List    Diagnosis Date Noted     Heart murmur 02/08/2021     Priority: Medium     Cataract of both eyes, unspecified cataract type 08/10/2020     Priority: Medium     Hypocalcemia 08/10/2020     Priority: Medium     Essential hypertension 04/03/2018     Priority: Medium     Type 2 diabetes mellitus with stage 3 chronic kidney disease, without long-term current use of insulin (H) 01/14/2017     Priority: Medium     Hyperlipidemia LDL goal <70      Priority: Medium     CKD (chronic kidney disease) stage 3, GFR 30-59 ml/min      Priority: Medium     Colon polyps      Priority: Medium     CAD (coronary artery disease)      Priority: Medium     Sicca (H) 04/11/2013     Priority: Medium     Pulmonary  hypertension (H) 08/20/2012     Priority: Medium     MILD       Macular degeneration 01/03/2012     Priority: Medium     Vitamin D deficiency 01/03/2012     Priority: Medium     Advanced directives, counseling/discussion 08/04/2011     Priority: Medium     Advance Directive Problem List Overview:   Name Relationship Phone    Primary Health Care Agent            Alternative Health Care Agent          Discussed advance care planning with patient; information given to patient to review. 8/4/2011          Esophageal reflux 04/30/2008     Priority: Medium     Mild persistent asthma      Priority: Medium      Past Medical History:   Diagnosis Date     CAD (coronary artery disease)      mild on CT angio     CKD (chronic kidney disease) stage 3, GFR 30-59 ml/min      Colon polyps 2009     Esophageal reflux 4/30/2008     Family history of ischemic heart disease      Fatigue 4/11/2013     Hiatal hernia 2009     History of blood transfusion 4/08    after hiatal hernia surgery     Hyperlipidemia LDL goal <70      Macular degeneration 1/3/2012     Mild persistent asthma      Pulmonary hypertension (H) 8/20/2012    mild     Scoliosis     mild      Type 2 diabetes mellitus with diabetic chronic kidney disease  (goal A1C<8) 10/20/2015     Ulcer of the stomach and intestine 2009    Anthony's ulcer     Unspecified essential hypertension      Vitamin D deficiency 1/3/2012     Past Surgical History:   Procedure Laterality Date     ABDOMEN SURGERY  4/08    hiatal hernia     BREAST SURGERY  1974    biopsy-begign     C OPEN STOMACH,REPR ULCER,SUTURE  2009    Hiatal hernia, and Anthony's ulcer     CARDIAC SURGERY  1995    angiogram     COLONOSCOPY  2008     HERNIA REPAIR  2008     HYSTERECTOMY, IDRIS  1974    Fibroids     Current Outpatient Medications   Medication Sig Dispense Refill     albuterol (PROAIR HFA/PROVENTIL HFA/VENTOLIN HFA) 108 (90 Base) MCG/ACT inhaler Inhale 2 puffs into the lungs every 6 hours as needed for shortness of  "breath / dyspnea or wheezing 18 g 11     aspirin 81 MG tablet Take 1 tablet by mouth daily.       diltiazem ER COATED BEADS (CARDIZEM CD) 360 MG 24 hr capsule Take 1 capsule (360 mg) by mouth daily 90 capsule 3     fluticasone-salmeterol (ADVAIR DISKUS) 500-50 MCG/DOSE inhaler INHALE 1 PUFF INTO THE LUNGS EVERY 12 HOURS 3 Inhaler 3     hydrALAZINE (APRESOLINE) 50 MG tablet Take 1.5 tablets (75 mg) by mouth 3 times daily 405 tablet 3     labetalol (NORMODYNE) 100 MG tablet Take 1 tablet (100 mg) by mouth 2 times daily 180 tablet 3     losartan (COZAAR) 100 MG tablet Take 1 tablet (100 mg) by mouth daily 90 tablet 3     omeprazole (PRILOSEC) 40 MG DR capsule Take 1 capsule (40 mg) by mouth daily 90 capsule 3     rosuvastatin (CRESTOR) 40 MG tablet Take 1 tablet (40 mg) by mouth daily 90 tablet 3     vitamin D3 (CHOLECALCIFEROL) 50 mcg (2000 units) tablet Take 1 tablet (50 mcg) by mouth daily         Allergies   Allergen Reactions     Lisinopril Cough     Hydrochlorothiazide      Renal insufficiency     Iodine Hives     Iodine contrast     Metformin      Loose stools     Niacin      LEG CRAMPS     Norvasc [Amlodipine Besylate] Swelling     Simvastatin      ACHINESS          Social History     Tobacco Use     Smoking status: Never Smoker     Smokeless tobacco: Never Used   Substance Use Topics     Alcohol use: Yes     Alcohol/week: 0.0 standard drinks     Comment: 2-3 weekly     Family History   Problem Relation Age of Onset     Heart Disease Father         CAD, DM, PVD     C.A.D. Father         1st MI in 80s     Heart Disease Mother         Rheumatic fever     Hypertension Sister         3 sisters, all with hypertension     C.A.D. Maternal Uncle         age 54 yrs, sudden cardiac     C.A.D. Maternal Uncle         age 55 yrs     Hypertension Sister      History   Drug Use No         Objective     /64   Pulse 77   Temp 97.6  F (36.4  C) (Temporal)   Resp 16   Ht 1.588 m (5' 2.5\")   Wt 60.3 kg (133 lb)   SpO2 " 98%   BMI 23.94 kg/m      Physical Exam  Eye: PERRL, EOMI. Bilateral catracts  HENT: ear canals and TM's normal and nose and mouth without ulcers or lesions   Neck: no adenopathy. Thyroid normal to palpation.  Strong carotid pulses bilaterally  Pulm: Lungs clear to auscultation   CV: Regular rates and rhythm. 1/6 JANES RUSB that radiates to the left carotid area and 1/6   SEMLLSB  GI: Soft, nontender, Normal active bowel sounds, No hepatosplenomegaly or masses palpable  Ext: Peripheral pulses intact. No edema.  Neuro: Normal strength and tone, sensory exam grossly normal      Recent Labs   Lab Test 02/01/21  0813 07/23/20  0901   HGB  --  13.0    140   POTASSIUM 3.9 4.1   CR 1.09* 0.95   A1C 6.9* 6.9*        Diagnostics:  Component      Latest Ref Rng & Units 7/23/2020 2/1/2021 6/8/2021   Sodium      133 - 144 mmol/L  137 135   Potassium      3.4 - 5.3 mmol/L  3.9 4.1   Chloride      94 - 109 mmol/L  106 103   Carbon Dioxide      20 - 32 mmol/L  27 26   Anion Gap      3 - 14 mmol/L  4 6   Glucose      70 - 99 mg/dL  147 (H) 124 (H)   Urea Nitrogen      7 - 30 mg/dL  15 17   Creatinine      0.52 - 1.04 mg/dL  1.09 (H) 1.23 (H)   GFR Estimate      >60 mL/min/1.73:m2  48 (L) 42 (L)   GFR Estimate If Black      >60 mL/min/1.73:m2  56 (L) 48 (L)   Calcium      8.5 - 10.1 mg/dL  9.1 8.9   Bilirubin Total      0.2 - 1.3 mg/dL   0.6   Albumin      3.4 - 5.0 g/dL   3.8   Protein Total      6.8 - 8.8 g/dL   6.9   Alkaline Phosphatase      40 - 150 U/L   84   ALT      0 - 50 U/L   18   AST      0 - 45 U/L   15   Hemoglobin A1C      0 - 5.6 % 6.9 (H)  6.7 (H)   Vitamin D Deficiency screening      20 - 75 ug/L   22         No EKG required for low risk surgery (cataract)      Revised Cardiac Risk Index (RCRI):  The patient has the following serious cardiovascular risks for perioperative complications:   - Coronary Artery Disease (MI, positive stress test, angina, Qs on EKG) = 1 point     RCRI Interpretation: 1 point: Class  II (low risk - 0.9% complication rate)           Signed Electronically by: Jose De Jesus Snow MD  Copy of this evaluation report is provided to requesting physician.

## 2021-06-08 NOTE — PATIENT INSTRUCTIONS
Take usual PM medications the night before surgery   Take  Losartan, Hydralazine and  Labetalol the  AM of surgery with a small sip water. Otherwise nothing to eat/drink after midnight prior to surgery  Labs as ordered

## 2021-06-09 PROBLEM — E83.51 HYPOCALCEMIA: Status: RESOLVED | Noted: 2020-08-10 | Resolved: 2021-06-09

## 2021-06-09 PROBLEM — N18.31 STAGE 3A CHRONIC KIDNEY DISEASE (H): Status: ACTIVE | Noted: 2021-06-09

## 2021-06-09 LAB — DEPRECATED CALCIDIOL+CALCIFEROL SERPL-MC: 22 UG/L (ref 20–75)

## 2021-06-10 ENCOUNTER — TELEPHONE (OUTPATIENT)
Dept: INTERNAL MEDICINE | Facility: CLINIC | Age: 81
End: 2021-06-10

## 2021-07-06 ENCOUNTER — TELEPHONE (OUTPATIENT)
Dept: INTERNAL MEDICINE | Facility: CLINIC | Age: 81
End: 2021-07-06

## 2021-07-06 NOTE — TELEPHONE ENCOUNTER
Patient Quality Outreach      Summary:    Patient has the following on her problem list/HM:     Asthma review       ACT Total Scores 2/8/2021   ACT TOTAL SCORE -   ASTHMA ER VISITS -   ASTHMA HOSPITALIZATIONS -   ACT TOTAL SCORE (Goal Greater than or Equal to 20) 21   In the past 12 months, how many times did you visit the emergency room for your asthma without being admitted to the hospital? 0   In the past 12 months, how many times were you hospitalized overnight because of your asthma? 0          Diabetes    Last A1C:  Lab Results   Component Value Date    A1C 6.7 06/08/2021    A1C 6.9 02/01/2021       Last LDL:    Lab Results   Component Value Date    LDL 91 07/23/2020       Is the patient on a Statin? Yes          Is the patient on Aspirin? Yes    Medications     HMG CoA Reductase Inhibitors     rosuvastatin (CRESTOR) 40 MG tablet       Salicylates     aspirin 81 MG tablet             Last three blood pressure readings:  BP Readings from Last 3 Encounters:   06/08/21 130/64   02/08/21 116/78   07/23/20 117/66            Tobacco Use      Smoking status: Never Smoker      Smokeless tobacco: Never Used          Immunizations     No immunizations due        Patient is due/failing the following:   Eye Exam, AAP and Annual wellness, date due: 04/16/2020    Type of outreach:    Sent letter.    Questions for provider review:    None                                                                                                                                     Karen Blair [facility]       Chart routed to none.

## 2021-07-06 NOTE — LETTER
Assessment:     Well adolescent  1  Encounter for routine child health examination with abnormal findings  SYRINGE/SINGLE-DOSE VIAL: influenza vaccine, [id number], quadrivalent, 0 5 mL, preservative-free, for patients 3+ yr (FLUZONE)    MENINGOCOCCAL CONJUGATE VACCINE MCV4P IM   2  Body mass index, pediatric, 5th percentile to less than 85th percentile for age     1  Exercise counseling     4  Nutritional counseling     5  Acne, unspecified acne type  clindamycin (CLINDAGEL) 1 % gel   6  Adolescent idiopathic scoliosis, unspecified spinal region          Plan:         1  Anticipatory guidance discussed  Specific topics reviewed: bicycle helmets, breast self-exam, drugs, ETOH, and tobacco, importance of regular dental care, importance of regular exercise, importance of varied diet, limit TV, media violence, minimize junk food, puberty, safe storage of any firearms in the home, seat belts and sex; STD and pregnancy prevention  Nutrition and Exercise Counseling: The patient's Body mass index is 21 32 kg/m²  This is 61 %ile (Z= 0 27) based on CDC (Girls, 2-20 Years) BMI-for-age based on BMI available as of 2/12/2020  Nutrition counseling provided:  Reviewed long term health goals and risks of obesity  Avoid juice/sugary drinks  Anticipatory guidance for nutrition given and counseled on healthy eating habits  5 servings of fruits/vegetables  Exercise counseling provided:  Anticipatory guidance and counseling on exercise and physical activity given  Reduce screen time to less than 2 hours per day  1 hour of aerobic exercise daily  Reviewed long term health goals and risks of obesity  2  Development: appropriate for age    1  Immunizations today: per orders  Discussed with: mother  The benefits, contraindication and side effects for the following vaccines were reviewed: Meningococcal and influenza  Total number of components reveiwed: 2    4   Follow-up visit in 1 year for next well child visit, [facility]  [address] [address]  [phone]      7/6/2021       Cristela Salgado  [address]  [facility] [address]        Dear Cristela,  Your healthcare team cares about your health. To provide you with the best care,   we have reviewed your chart and based on our findings, we see that you are due to:     Annual Wellness Follow up-Please contact number listed above to schedule or schedule via [employer].     If you have already completed these items, please contact the clinic via phone or   [employer] so your care team can review and update your records. Thank you for   choosing [facility] for your healthcare needs. For any questions,   concerns, or to schedule an appointment please contact the clinic.     Healthy Regards,    Your [facility] Care Team         or sooner as needed  Discussed scoliosis and patient was cleared for follow-up only as needed by [facility] in December of 2017  Will try clindamycin for acne and if no improvement in 6 weeks, will consider referral to dermatology  Return as needed as well  Mother verbalized understanding  Subjective:     Julia Stone is a 13 y o  female who is here for this well-child visit  Current Issues:  Current concerns include   regular periods, no issues, menarche 13 and LMP : 2 weeks ago    The following portions of the patient's history were reviewed and updated as appropriate: allergies, current medications, past family history, past medical history, past social history, past surgical history and problem list     Well Child Assessment:  History was provided by the mother  Ana Rosa Fleming lives with her mother and brother  Nutrition  Types of intake include fruits, vegetables, meats, eggs and fish (some healthy and some not, yogurt)  Dental  The patient has a dental home  The patient brushes teeth regularly  The patient flosses regularly  Last dental exam was less than 6 months ago  Elimination  Elimination problems do not include constipation, diarrhea or urinary symptoms  There is no bed wetting  Behavioral  Disciplinary methods include consistency among caregivers, praising good behavior and taking away privileges  Sleep  Average sleep duration is 6 hours  The patient does not snore  There are sleep problems  Safety  There is no smoking in the home  Home has working smoke alarms? yes  Home has working carbon monoxide alarms? no  There is no gun in home  School  Current grade level is 10th  There are no signs of learning disabilities  Child is doing well in school  Screening  There are no risk factors for hearing loss  There are no risk factors for anemia  There are no risk factors for dyslipidemia  There are no risk factors for tuberculosis  There are no risk factors for vision problems   There are no risk factors related to diet  There are no risk factors at school  There are no risk factors related to alcohol  There are no risk factors related to relationships  There are no risk factors related to friends or family  There are no risk factors related to emotions  There are no risk factors related to drugs  There are no risk factors related to personal safety  There are no risk factors related to tobacco    Social  The caregiver enjoys the child  After school activity: works at [employer]  Sibling interactions are good  The child spends 3 hours in front of a screen (tv or computer) per day  Objective:       Vitals:    02/12/20 1356   BP: (!) 100/62   BP Location: Left arm   Patient Position: Sitting   Cuff Size: Adult   Temp: 98 3 °F (36 8 °C)   TempSrc: Tympanic   Weight: 50 3 kg (111 lb)   Height: 5' 0 5" (1 537 m)     Growth parameters are noted and are appropriate for age  Wt Readings from Last 1 Encounters:   02/12/20 50 3 kg (111 lb) (34 %, Z= -0 43)*     * Growth percentiles are based on CDC (Girls, 2-20 Years) data  Ht Readings from Last 1 Encounters:   02/12/20 5' 0 5" (1 537 m) (8 %, Z= -1 37)*     * Growth percentiles are based on CDC (Girls, 2-20 Years) data  Body mass index is 21 32 kg/m²  Vitals:    02/12/20 1356   BP: (!) 100/62   BP Location: Left arm   Patient Position: Sitting   Cuff Size: Adult   Temp: 98 3 °F (36 8 °C)   TempSrc: Tympanic   Weight: 50 3 kg (111 lb)   Height: 5' 0 5" (1 537 m)       No exam data present    Physical Exam   Constitutional: She is oriented to person, place, and time  She appears well-developed and well-nourished  HENT:   Head: Normocephalic  Right Ear: External ear normal    Left Ear: External ear normal    Nose: Nose normal    Mouth/Throat: Oropharynx is clear and moist    Eyes: Pupils are equal, round, and reactive to light  Conjunctivae and EOM are normal    Neck: Normal range of motion  Neck supple     Cardiovascular: Normal rate, regular rhythm, normal heart sounds and intact distal pulses  Pulmonary/Chest: Effort normal and breath sounds normal    Abdominal: Soft  Bowel sounds are normal    Genitourinary: Vagina normal    Genitourinary Comments: Normal female genitalia, Dustin stage 5  Musculoskeletal: Normal range of motion  Significant scoliosis in the thoracic to lumbar area  Neurological: She is alert and oriented to person, place, and time  Skin: Skin is warm  Capillary refill takes less than 2 seconds  Mild to moderate papular acne on face  Psychiatric: She has a normal mood and affect  Her behavior is normal    Nursing note and vitals reviewed

## 2021-08-06 DIAGNOSIS — I10 ESSENTIAL HYPERTENSION: ICD-10-CM

## 2021-08-06 RX ORDER — LABETALOL 100 MG/1
TABLET, FILM COATED ORAL
Qty: 180 TABLET | Refills: 3 | Status: SHIPPED | OUTPATIENT
Start: 2021-08-06 | End: 2022-08-09

## 2021-08-06 RX ORDER — LOSARTAN POTASSIUM 100 MG/1
TABLET ORAL
Qty: 90 TABLET | Refills: 3 | Status: SHIPPED | OUTPATIENT
Start: 2021-08-06 | End: 2022-08-17

## 2021-08-06 NOTE — TELEPHONE ENCOUNTER
Routing refill request to provider for review/approval because:  Labs out of range:    Creatinine   Date Value Ref Range Status   06/08/2021 1.23 (H) 0.52 - 1.04 mg/dL Final         Damián Robertson RN  [facility] Triage Nurse

## 2021-09-09 ENCOUNTER — TELEPHONE (OUTPATIENT)
Dept: INTERNAL MEDICINE | Facility: CLINIC | Age: 81
End: 2021-09-09

## 2021-09-09 NOTE — TELEPHONE ENCOUNTER
Pharmacy calling needing verbal to clarify that Hydralazine is to be TID as daily is not on script, huddled with Dr. Snow and verbal given to pharmacist     Damián Robertson RN

## 2021-11-05 DIAGNOSIS — I10 ESSENTIAL HYPERTENSION: ICD-10-CM

## 2021-11-05 RX ORDER — DILTIAZEM HYDROCHLORIDE 360 MG/1
CAPSULE, EXTENDED RELEASE ORAL
Qty: 90 CAPSULE | Refills: 2 | Status: SHIPPED | OUTPATIENT
Start: 2021-11-05 | End: 2022-08-13

## 2021-11-05 NOTE — TELEPHONE ENCOUNTER
Routing refill request to provider for review/approval because:  Creatinine   Date Value Ref Range Status   06/08/2021 1.23 (H) 0.52 - 1.04 mg/dL Final             Aida Roque RN  [facility]

## 2021-11-24 DIAGNOSIS — N18.31 STAGE 3A CHRONIC KIDNEY DISEASE (H): Primary | ICD-10-CM

## 2021-11-24 DIAGNOSIS — N18.31 TYPE 2 DIABETES MELLITUS WITH STAGE 3A CHRONIC KIDNEY DISEASE, WITHOUT LONG-TERM CURRENT USE OF INSULIN (H): ICD-10-CM

## 2021-11-24 DIAGNOSIS — E78.5 HYPERLIPIDEMIA LDL GOAL <70: ICD-10-CM

## 2021-11-24 DIAGNOSIS — E11.22 TYPE 2 DIABETES MELLITUS WITH STAGE 3A CHRONIC KIDNEY DISEASE, WITHOUT LONG-TERM CURRENT USE OF INSULIN (H): ICD-10-CM

## 2021-11-24 RX ORDER — ROSUVASTATIN CALCIUM 40 MG/1
TABLET, COATED ORAL
Qty: 90 TABLET | Refills: 0 | Status: SHIPPED | OUTPATIENT
Start: 2021-11-24 | End: 2021-12-23

## 2021-11-24 NOTE — TELEPHONE ENCOUNTER
Routing refill request to provider for review/approval because:  LDL Cholesterol Calculated   Date Value Ref Range Status   07/23/2020 91 <100 mg/dL Final     Comment:     Desirable:       <100 mg/dl         Damián Robertson RN  [facility] Triage Nurse

## 2021-11-24 NOTE — TELEPHONE ENCOUNTER
Patient due for fasting labs to follow-up on cholesterol status and other medical issues.  Medication refilled for 3 days.  Call patient and assist in scheduling lab appointment in the next few weeks as ordered

## 2021-11-26 ENCOUNTER — MYC MEDICAL ADVICE (OUTPATIENT)
Dept: INTERNAL MEDICINE | Facility: CLINIC | Age: 81
End: 2021-11-26
Payer: COMMERCIAL

## 2021-12-21 ENCOUNTER — NURSE TRIAGE (OUTPATIENT)
Dept: INTERNAL MEDICINE | Facility: CLINIC | Age: 81
End: 2021-12-21

## 2021-12-21 ENCOUNTER — ALLIED HEALTH/NURSE VISIT (OUTPATIENT)
Dept: INTERNAL MEDICINE | Facility: CLINIC | Age: 81
End: 2021-12-21

## 2021-12-21 ENCOUNTER — LAB (OUTPATIENT)
Dept: LAB | Facility: CLINIC | Age: 81
End: 2021-12-21
Payer: COMMERCIAL

## 2021-12-21 VITALS — SYSTOLIC BLOOD PRESSURE: 138 MMHG | DIASTOLIC BLOOD PRESSURE: 64 MMHG

## 2021-12-21 DIAGNOSIS — I10 ESSENTIAL HYPERTENSION: Primary | ICD-10-CM

## 2021-12-21 DIAGNOSIS — N18.31 STAGE 3A CHRONIC KIDNEY DISEASE (H): Primary | ICD-10-CM

## 2021-12-21 DIAGNOSIS — N18.31 TYPE 2 DIABETES MELLITUS WITH STAGE 3A CHRONIC KIDNEY DISEASE, WITHOUT LONG-TERM CURRENT USE OF INSULIN (H): ICD-10-CM

## 2021-12-21 DIAGNOSIS — E11.22 TYPE 2 DIABETES MELLITUS WITH STAGE 3A CHRONIC KIDNEY DISEASE, WITHOUT LONG-TERM CURRENT USE OF INSULIN (H): ICD-10-CM

## 2021-12-21 DIAGNOSIS — N18.31 STAGE 3A CHRONIC KIDNEY DISEASE (H): ICD-10-CM

## 2021-12-21 DIAGNOSIS — E78.5 HYPERLIPIDEMIA LDL GOAL <70: ICD-10-CM

## 2021-12-21 LAB
ANION GAP SERPL CALCULATED.3IONS-SCNC: 5 MMOL/L (ref 3–14)
BUN SERPL-MCNC: 15 MG/DL (ref 7–30)
CALCIUM SERPL-MCNC: 9.5 MG/DL (ref 8.5–10.1)
CHLORIDE BLD-SCNC: 105 MMOL/L (ref 94–109)
CO2 SERPL-SCNC: 25 MMOL/L (ref 20–32)
CREAT SERPL-MCNC: 1.05 MG/DL (ref 0.52–1.04)
CREAT UR-MCNC: 89 MG/DL
GFR SERPL CREATININE-BSD FRML MDRD: 53 ML/MIN/1.73M2
GLUCOSE BLD-MCNC: 157 MG/DL (ref 70–99)
HBA1C MFR BLD: 6.8 % (ref 0–5.6)
MICROALBUMIN UR-MCNC: 16 MG/L
MICROALBUMIN/CREAT UR: 17.98 MG/G CR (ref 0–25)
POTASSIUM BLD-SCNC: 4 MMOL/L (ref 3.4–5.3)
SODIUM SERPL-SCNC: 135 MMOL/L (ref 133–144)

## 2021-12-21 PROCEDURE — 80061 LIPID PANEL: CPT

## 2021-12-21 PROCEDURE — 36415 COLL VENOUS BLD VENIPUNCTURE: CPT

## 2021-12-21 PROCEDURE — 82043 UR ALBUMIN QUANTITATIVE: CPT

## 2021-12-21 PROCEDURE — 80048 BASIC METABOLIC PNL TOTAL CA: CPT

## 2021-12-21 PROCEDURE — 83036 HEMOGLOBIN GLYCOSYLATED A1C: CPT

## 2021-12-21 PROCEDURE — 99207 PR NO CHARGE NURSE ONLY: CPT | Performed by: INTERNAL MEDICINE

## 2021-12-21 NOTE — TELEPHONE ENCOUNTER
Reason for Call:  Other appointment and medical questions    Detailed comments: Patient is wondering if she is having some hernia problems. Burning feeling in throat and neck. Patient also having trouble with legs. Legs went out on her yesterday. Patient made appointment but could not get in till Feb. Patient would like to come in sooner to see Dr Snow    Phone Number Patient can be reached at: Cell number on file:    Telephone Information:   Mobile [phone]       Best Time: anytime    Can we leave a detailed message on this number? Not Applicable    Call taken on 12/21/2021 at 11:10 AM by Kisha Cortes

## 2021-12-21 NOTE — TELEPHONE ENCOUNTER
Have 2 next day openings on 12-28-21. Pls advise if this would be appropriate to use for this patient. Thank you, Emma Santillan, CMA

## 2021-12-21 NOTE — TELEPHONE ENCOUNTER
Dr. Snow,     yesterday pt was walking up the stairs, and suddenly her legs got weak & legs gave out, she lowered herself to the stairs so she wouldn't fall.   She is Wondering if she was dehydrated? Or low BP? (Pt does not have a BP machine at home).  Yesterday, after this episode, her  helped her get up, she went to the bedroom, drank a lot of water, and then rested/napped for few hours. After nap, she felt pretty good when she got up.    Today, her legs just feel a little weak, and tired.     FYI--  Several years ago, pt had similar symptoms, woke up one morninig, legs gave out, and she went down. Went to ER, and determined she was severely dehydrated. (was on high dose of hydralazine then)    Currently taking Hydralazine 50mg BID.  The Directions are: TAKE ONE AND ONE-HALF TABLETS BY MOUTH THREE TIMES.   So she is not taking this med as per the directions (last ordered on 9/9/2021).    Please advise, what do you recommend for pt?    OK to schedule her next week in one of your same-day slots?  She would prefer:  1st) the Thurs 2pm  2nd) Mon 11:30am  3rd) Fri 11:30am.      Reason for Disposition    MODERATE weakness (i.e., interferes with work, school, normal activities) and cause unknown (Exceptions: weakness with acute minor illness, or weakness from poor fluid intake)    Additional Information    Negative: Severe difficulty breathing (e.g., struggling for each breath, speaks in single words)    Negative: Shock suspected (e.g., cold/pale/clammy skin, too weak to stand, low BP, rapid pulse)    Negative: Difficult to awaken or acting confused (e.g., disoriented, slurred speech)    Negative: Fainted > 15 minutes ago and still feels too weak or dizzy to stand    Negative: SEVERE weakness (i.e., unable to walk or barely able to walk, requires support) and new onset or worsening    Negative: Sounds like a life-threatening emergency to the triager    Negative: Difficulty breathing    Negative: Heart beating < 50  beats per minute OR > 140 beats per minute    Negative: Extra heartbeats OR irregular heart beating (i.e., 'palpitations')    Negative: Follows bleeding (e.g., from vomiting, rectum, vagina) (Exception: small transient weakness from sight of a small amount blood)    Negative: Bloody, black, or tarry bowel movements (Exception: chronic-unchanged  black-grey bowel movements and is taking iron pills or Pepto-bismol)    Negative: MODERATE weakness from poor fluid intake with no improvement after 2 hours of rest and fluids    Negative: Drinking very little and dehydration suspected (e.g., no urine > 12 hours, very dry mouth, very lightheaded)    Negative: Patient sounds very sick or weak to the triager    Protocols used: WEAKNESS (GENERALIZED) AND FATIGUE-A-OH

## 2021-12-21 NOTE — PROGRESS NOTES
Cristela Salgado was evaluated at [facility] on December 21, 2021 at which time her blood pressure was:    BP Readings from Last 3 Encounters:   12/21/21 138/64   06/08/21 130/64   02/08/21 116/78     Pulse Readings from Last 3 Encounters:   06/08/21 77   02/08/21 74   07/23/20 68       Reviewed lifestyle modifications for blood pressure control and reduction: including making healthy food choices, managing weight, getting regular exercise, smoking cessation, reducing alcohol consumption, monitoring blood pressure regularly.     Symptoms: None    BP Goal:< 140/90 mmHg    BP Assessment:  BP at goal    Potential Reasons for BP too high: NA - Not applicable    BP Follow-Up Plan: Recheck BP in 6 months at pharmacy    Recommendation to Provider: Continue current therapy    Note completed by: Leandro Genao, PharmD  [facility]  [phone]

## 2021-12-22 LAB
CHOLEST SERPL-MCNC: 214 MG/DL
FASTING STATUS PATIENT QL REPORTED: YES
HDLC SERPL-MCNC: 74 MG/DL
LDLC SERPL CALC-MCNC: 111 MG/DL
NONHDLC SERPL-MCNC: 140 MG/DL
TRIGL SERPL-MCNC: 146 MG/DL

## 2021-12-23 RX ORDER — ROSUVASTATIN CALCIUM 40 MG/1
40 TABLET, COATED ORAL DAILY
Qty: 90 TABLET | Refills: 3 | Status: SHIPPED | OUTPATIENT
Start: 2021-12-23 | End: 2022-03-16

## 2021-12-23 NOTE — TELEPHONE ENCOUNTER
"Noted.  Reduce saturated fats (red meats, fried and processed foods) in her diet and increase the amount of color on her plate with fruits and vegetables.  Continue current medication.  Call our appointment desk at [phone] or use [employer] to schedule a \"lab only\" to have a Basic Metabolic Panel and Lipid profile (Cholesterol Panel) rechecked fasting in 3 months. For fasting labs, pt to  refrain from eating for 8 hours or more.  Be sure to  drink water and take  medications the day of the test.    "

## 2021-12-23 NOTE — TELEPHONE ENCOUNTER
Spoke to pt, she is feeling better. No longer feeling the weakness in her legs. Still feeling tired. BP was done at the pharmacy:    BP Readings from Last 6 Encounters:   12/21/21 138/64   06/08/21 130/64   02/08/21 116/78   07/23/20 117/66   05/17/19 112/70   04/16/19 132/62     And she had labs done Monday.         Notified pt that if the weakness returns, or if she is unable to stand/walk, passes out, or any breathing difficulty - then she needs to be seen right away UC/ER.    Patient stated understanding, and agreed to plan of care.

## 2021-12-23 NOTE — TELEPHONE ENCOUNTER
Blood pressure at goal.  Recent labs showed good diabetic control and kidney function improved with history of CKD.  Lipids elevated from 1 year ago.   Patient on rosuvastatin/Crestor per chart.  Call patient to find if she is still taking rosuvastatin every day and route updated information back to MD.  With regards to leg symptoms, plan patient doing better.  Maintain hydration with a couple glasses of water per day and monitor.  Will make determination    regarding timing of future lab follow-up based on updated information as requested above

## 2022-01-11 DIAGNOSIS — K21.9 GASTROESOPHAGEAL REFLUX DISEASE WITHOUT ESOPHAGITIS: ICD-10-CM

## 2022-01-12 RX ORDER — OMEPRAZOLE 40 MG/1
CAPSULE, DELAYED RELEASE ORAL
Qty: 90 CAPSULE | Refills: 1 | Status: SHIPPED | OUTPATIENT
Start: 2022-01-12 | End: 2022-08-17

## 2022-01-12 NOTE — TELEPHONE ENCOUNTER
Prescription approved per [facility] Refill Protocol.  Asuncion Rapp RN

## 2022-02-08 ENCOUNTER — OFFICE VISIT (OUTPATIENT)
Dept: INTERNAL MEDICINE | Facility: CLINIC | Age: 82
End: 2022-02-08
Payer: COMMERCIAL

## 2022-02-08 VITALS
OXYGEN SATURATION: 98 % | RESPIRATION RATE: 16 BRPM | HEART RATE: 75 BPM | BODY MASS INDEX: 23.74 KG/M2 | WEIGHT: 134 LBS | HEIGHT: 63 IN | DIASTOLIC BLOOD PRESSURE: 78 MMHG | SYSTOLIC BLOOD PRESSURE: 124 MMHG | TEMPERATURE: 97.5 F

## 2022-02-08 DIAGNOSIS — M62.81 MUSCLE WEAKNESS (GENERALIZED): ICD-10-CM

## 2022-02-08 DIAGNOSIS — E78.5 HYPERLIPIDEMIA LDL GOAL <70: ICD-10-CM

## 2022-02-08 DIAGNOSIS — N18.31 TYPE 2 DIABETES MELLITUS WITH STAGE 3A CHRONIC KIDNEY DISEASE, WITHOUT LONG-TERM CURRENT USE OF INSULIN (H): ICD-10-CM

## 2022-02-08 DIAGNOSIS — E11.22 TYPE 2 DIABETES MELLITUS WITH STAGE 3A CHRONIC KIDNEY DISEASE, WITHOUT LONG-TERM CURRENT USE OF INSULIN (H): ICD-10-CM

## 2022-02-08 DIAGNOSIS — M85.80 OSTEOPENIA, UNSPECIFIED LOCATION: ICD-10-CM

## 2022-02-08 DIAGNOSIS — M54.16 LUMBAR RADICULOPATHY: Primary | ICD-10-CM

## 2022-02-08 LAB
ALBUMIN SERPL-MCNC: 3.5 G/DL (ref 3.4–5)
ALP SERPL-CCNC: 86 U/L (ref 40–150)
ALT SERPL W P-5'-P-CCNC: 18 U/L (ref 0–50)
ANION GAP SERPL CALCULATED.3IONS-SCNC: 7 MMOL/L (ref 3–14)
AST SERPL W P-5'-P-CCNC: 13 U/L (ref 0–45)
BILIRUB SERPL-MCNC: 0.4 MG/DL (ref 0.2–1.3)
BUN SERPL-MCNC: 16 MG/DL (ref 7–30)
CALCIUM SERPL-MCNC: 8.8 MG/DL (ref 8.5–10.1)
CHLORIDE BLD-SCNC: 107 MMOL/L (ref 94–109)
CK SERPL-CCNC: 63 U/L (ref 30–225)
CO2 SERPL-SCNC: 25 MMOL/L (ref 20–32)
CORTIS SERPL-MCNC: 7.7 UG/DL (ref 4–22)
CREAT SERPL-MCNC: 1.21 MG/DL (ref 0.52–1.04)
ERYTHROCYTE [DISTWIDTH] IN BLOOD BY AUTOMATED COUNT: 13.2 % (ref 10–15)
ERYTHROCYTE [SEDIMENTATION RATE] IN BLOOD BY WESTERGREN METHOD: 13 MM/HR (ref 0–30)
GFR SERPL CREATININE-BSD FRML MDRD: 45 ML/MIN/1.73M2
GLUCOSE BLD-MCNC: 162 MG/DL (ref 70–99)
HCT VFR BLD AUTO: 38.1 % (ref 35–47)
HGB BLD-MCNC: 12.1 G/DL (ref 11.7–15.7)
MCH RBC QN AUTO: 28.5 PG (ref 26.5–33)
MCHC RBC AUTO-ENTMCNC: 31.8 G/DL (ref 31.5–36.5)
MCV RBC AUTO: 90 FL (ref 78–100)
PLATELET # BLD AUTO: 243 10E3/UL (ref 150–450)
POTASSIUM BLD-SCNC: 3.7 MMOL/L (ref 3.4–5.3)
PROT SERPL-MCNC: 6.7 G/DL (ref 6.8–8.8)
RBC # BLD AUTO: 4.25 10E6/UL (ref 3.8–5.2)
SODIUM SERPL-SCNC: 139 MMOL/L (ref 133–144)
TSH SERPL DL<=0.005 MIU/L-ACNC: 2.65 MU/L (ref 0.4–4)
WBC # BLD AUTO: 6.1 10E3/UL (ref 4–11)

## 2022-02-08 PROCEDURE — 82550 ASSAY OF CK (CPK): CPT | Performed by: INTERNAL MEDICINE

## 2022-02-08 PROCEDURE — 36415 COLL VENOUS BLD VENIPUNCTURE: CPT | Performed by: INTERNAL MEDICINE

## 2022-02-08 PROCEDURE — 84443 ASSAY THYROID STIM HORMONE: CPT | Performed by: INTERNAL MEDICINE

## 2022-02-08 PROCEDURE — 80053 COMPREHEN METABOLIC PANEL: CPT | Performed by: INTERNAL MEDICINE

## 2022-02-08 PROCEDURE — 85652 RBC SED RATE AUTOMATED: CPT | Performed by: INTERNAL MEDICINE

## 2022-02-08 PROCEDURE — 99214 OFFICE O/P EST MOD 30 MIN: CPT | Performed by: INTERNAL MEDICINE

## 2022-02-08 PROCEDURE — 82533 TOTAL CORTISOL: CPT | Performed by: INTERNAL MEDICINE

## 2022-02-08 PROCEDURE — 85027 COMPLETE CBC AUTOMATED: CPT | Performed by: INTERNAL MEDICINE

## 2022-02-08 ASSESSMENT — ASTHMA QUESTIONNAIRES
ACT_TOTALSCORE: 25
QUESTION_4 LAST FOUR WEEKS HOW OFTEN HAVE YOU USED YOUR RESCUE INHALER OR NEBULIZER MEDICATION (SUCH AS ALBUTEROL): NOT AT ALL
QUESTION_3 LAST FOUR WEEKS HOW OFTEN DID YOUR ASTHMA SYMPTOMS (WHEEZING, COUGHING, SHORTNESS OF BREATH, CHEST TIGHTNESS OR PAIN) WAKE YOU UP AT NIGHT OR EARLIER THAN USUAL IN THE MORNING: NOT AT ALL
QUESTION_2 LAST FOUR WEEKS HOW OFTEN HAVE YOU HAD SHORTNESS OF BREATH: NOT AT ALL
ACT_TOTALSCORE: 25
QUESTION_1 LAST FOUR WEEKS HOW MUCH OF THE TIME DID YOUR ASTHMA KEEP YOU FROM GETTING AS MUCH DONE AT WORK, SCHOOL OR AT HOME: NONE OF THE TIME
QUESTION_5 LAST FOUR WEEKS HOW WOULD YOU RATE YOUR ASTHMA CONTROL: COMPLETELY CONTROLLED

## 2022-02-08 ASSESSMENT — MIFFLIN-ST. JEOR: SCORE: 1039.01

## 2022-02-08 NOTE — PROGRESS NOTES
ASSESSMENT:   1. Lumbar radiculopathy  Symptoms suggestive of foraminal or spinal stenosis.  Given the symptoms, will check lumbar sacral MRI  - MR Lumbar Spine w/o Contrast; Future    2. Muscle weakness (generalized)  Subjective sense of weakness.  Strength testing okay in legs today.  Labs as ordered.  We will also have patient hold statin to see if contributing to side effects  - CK total; Future  - CBC with platelets; Future  - TSH with free T4 reflex; Future  - Erythrocyte sedimentation rate auto; Future  - Comprehensive metabolic panel; Future  - Cortisol; Future    3. Osteopenia, unspecified location  Mild osteopenia.  Not to the point requiring prescription therapy.  Recheck 2024    4. Type 2 diabetes mellitus with stage 3a chronic kidney disease, without long-term current use of insulin (H)  Controlled with diet therapy.  Recheck A1c in 6 months.  Order in chart already through [facility] lab order protocol    5. Hyperlipidemia LDL goal <70  Statin questionably contributing to leg symptoms.  We will have patient hold rosuvastatin for couple weeks to see if it makes a difference.  Patient will update me in MyChart         PLAN:   Labs as ordered   Hold Rosuvastatin for 2 weeks to see if legs  and other muscle symptoms improve and update me in Mychart  note   MRI Lumbar spine. [facility] will call to schedule or you may call [phone]  Continue other medications  Repeat bone density scan again in 2024      (Chart documentation was completed, in part, with [employer] voice-recognition software. Even though reviewed, some grammatical, spelling, and word errors may remain.)    Jose De Jesus Snow MD  Internal Medicine Department  [facility]      Wilton Archibald is a 81 year old who presents for the following health issues     HPI     Chief Complaint   Patient presents with     Numbness     Patient loss feeling in both legs in December was told to go to ED if it happened again.     Radiology  Visit     Review previous DEXA        Most recent lab results reviewed with pt.     Had near fall  Mid December  with legs weak.  ? Dehydration. Was not seen and no recurrence re: weakness but gets numbness and occ pain   in RLE to the toes. No sx left leg. No B/B incontinence   Not checking sugars at home.  Watching carbs  somewhat   No chest pain or shortness of breath. On Advair for asthma  Muscles feel more tired in legs. Not sleepy. Sleeping 10 hours. refreshed in AM  No neck pain. No UE sx    Component      Latest Ref Rng & Units 6/8/2021 12/21/2021   Sodium      133 - 144 mmol/L 135 135   Potassium      3.4 - 5.3 mmol/L 4.1 4.0   Chloride      94 - 109 mmol/L 103 105   Carbon Dioxide      20 - 32 mmol/L 26 25   Anion Gap      3 - 14 mmol/L 6 5   Glucose      70 - 99 mg/dL 124 (H) 157 (H)   Urea Nitrogen      7 - 30 mg/dL 17 15   Creatinine      0.52 - 1.04 mg/dL 1.23 (H) 1.05 (H)   GFR Estimate      >60 mL/min/1.73m2 42 (L) 53 (L)   GFR Estimate If Black      >60 mL/min/1.73:m2 48 (L)    Calcium      8.5 - 10.1 mg/dL 8.9 9.5   Bilirubin Total      0.2 - 1.3 mg/dL 0.6    Albumin      3.4 - 5.0 g/dL 3.8    Protein Total      6.8 - 8.8 g/dL 6.9    Alkaline Phosphatase      40 - 150 U/L 84    ALT      0 - 50 U/L 18    AST      0 - 45 U/L 15    Cholesterol      <200 mg/dL  214 (H)   Triglycerides      <150 mg/dL  146   HDL Cholesterol      >=50 mg/dL  74   LDL Cholesterol Calculated      <=100 mg/dL  111 (H)   Non HDL Cholesterol      <130 mg/dL  140 (H)   Patient Fasting > 8hrs?        Yes   Creatinine Urine      mg/dL  89   Albumin Urine mg/L      mg/L  16   Albumin Urine mg/g Cr      0.00 - 25.00 mg/g Cr  17.98   Vitamin D Deficiency screening      20 - 75 ug/L 22    Hemoglobin A1C POCT      0 - 5.6 % 6.7 (H)    Hemoglobin A1C      0.0 - 5.6 %  6.8 (H)       DEXA 3/1/21      FINDINGS:               Lumbar Spine (L1-L4)      T-score: 0.6     Scoliosis, degenerative and/or osteosclerotic changes are present,  falsely improving result.                   Left Femoral Neck            T-score:  -1.3               Right Femoral Neck          T-score:  -0.7                             Lumbar (L1-L4) BMD: 1.268  Previous: 1.303                          Total Hip Mean BMD: 1.000  Previous: 1.131     Comparison is made to another DXA performed on the same Lunar Prodigy  machine on 6/10.       LATERAL VERTEBRAL ASSESSMENT  Procedure:  Vertebral fracture assessment was performed in the lateral decubitus position using a Lunar Prodigy  densitometer.  Indications for VFA: T-score of -1.0 or worse and age (female>69)  Confounding factors for VFA: Arthritis/degenerative disc disease and rib shadows.  The LVA scan is interpretable from T7 to T9 and L1 to L2.     VFA Findings: Using the semi-quantitative analysis of Susy there was evidence of no spinal deformity  VFA Impression: Cristela Salgado has no vertebral fractures identified on the VFA.   Further evaluation may be warranted due to presence of confounding factors      IMPRESSION  Osteopenia (low bone mass)  Recommendations include ensuring adequate daily Calcium and Vitamin D intake     Compared to previous bone densitometry performed on this patient, there is the suggestion of significant worsening of the lumbar spine and the (total) hip.     Current NOF guidelines recommend treatment for patients with the following:  - Prior hip or vertebral fracture  - T-score -2.5 or below  - A 10 year risk of any major osteoporotic fracture >20% or 10 year risk of hip fracture >3%, as calculated using the FRAX calculator (www.shef.ac.uk/FRAX).       This patient's risks with the use of FRAX (based on available information) are 12 % for major osteoporotic fracture and 2.7 % for hip fracture.   Based on these guidelines, treatment (in addition to calcium and vitamin D) is not recommended for this patient.  While this is meant as an aid to clinical decision-making, clinical judgment must still  "be used.        Additional ROS:   Constitutional, HEENT, Cardiovascular, Pulmonary, GI and , Neuro, MSK and Psych review of systems/symptoms are otherwise negative or unchanged from previous, except as noted above.      OBJECTIVE:  /78   Pulse 75   Temp 97.5  F (36.4  C) (Temporal)   Resp 16   Ht 1.588 m (5' 2.5\")   Wt 60.8 kg (134 lb)   SpO2 98%   BMI 24.12 kg/m     Estimated body mass index is 24.12 kg/m  as calculated from the following:    Height as of this encounter: 1.588 m (5' 2.5\").    Weight as of this encounter: 60.8 kg (134 lb).     Neck: no adenopathy. Thyroid normal to palpation. No bruits  Pulm: Lungs clear to auscultation   CV: Regular rates and rhythm. 1/6 JANES RUSB  GI: Soft, nontender, Normal active bowel sounds, No hepatosplenomegaly or masses palpable  Ext: Peripheral pulses intact. No edema. Minimal achiness to deep palpation bilateral LE musculature of calves and thighs  Neuro: Normal strength and tone despite subjective sx. Sensory exam grossly normal  Back: Tenderness to palpation bilateral paralumbar musculature.  Negative straight leg raise test bilaterally             "

## 2022-02-08 NOTE — PATIENT INSTRUCTIONS
Labs as ordered   Hold Rosuvastatin for 2 weeks to see if legs  and other muscle symptoms improve and update me in Mychart  note   MRI Lumbar spine. [facility] will call to schedule or you may call [phone]  Continue other medications  Repeat bone density scan again in 2024

## 2022-02-14 DIAGNOSIS — I10 ESSENTIAL HYPERTENSION: ICD-10-CM

## 2022-02-15 ENCOUNTER — HOSPITAL ENCOUNTER (OUTPATIENT)
Dept: MRI IMAGING | Facility: CLINIC | Age: 82
Discharge: HOME | End: 2022-02-15
Attending: INTERNAL MEDICINE | Admitting: INTERNAL MEDICINE
Payer: COMMERCIAL

## 2022-02-15 DIAGNOSIS — M54.16 LUMBAR RADICULOPATHY: ICD-10-CM

## 2022-02-15 PROCEDURE — 72148 MRI LUMBAR SPINE W/O DYE: CPT

## 2022-02-15 RX ORDER — HYDRALAZINE HYDROCHLORIDE 50 MG/1
TABLET, FILM COATED ORAL
Qty: 405 TABLET | Refills: 3 | Status: SHIPPED | OUTPATIENT
Start: 2022-02-15 | End: 2022-08-26

## 2022-02-15 NOTE — TELEPHONE ENCOUNTER
Prescription approved per [facility] Refill Protocol.  Tonya Brown, RN  [facility] Triage Nurse

## 2022-02-28 ENCOUNTER — TELEPHONE (OUTPATIENT)
Dept: INTERNAL MEDICINE | Facility: CLINIC | Age: 82
End: 2022-02-28
Payer: COMMERCIAL

## 2022-03-15 DIAGNOSIS — E78.5 HYPERLIPIDEMIA LDL GOAL <70: ICD-10-CM

## 2022-03-16 RX ORDER — ROSUVASTATIN CALCIUM 40 MG/1
TABLET, COATED ORAL
Qty: 90 TABLET | Refills: 2 | Status: SHIPPED | OUTPATIENT
Start: 2022-03-16 | End: 2023-03-12

## 2022-03-16 NOTE — TELEPHONE ENCOUNTER
Prescription approved per [facility] Refill Protocol.  Sandra LÓPEZ RN  [facility]

## 2022-05-27 ENCOUNTER — VIRTUAL VISIT (OUTPATIENT)
Dept: INTERNAL MEDICINE | Facility: CLINIC | Age: 82
End: 2022-05-27
Payer: COMMERCIAL

## 2022-05-27 DIAGNOSIS — N18.31 TYPE 2 DIABETES MELLITUS WITH STAGE 3A CHRONIC KIDNEY DISEASE, WITHOUT LONG-TERM CURRENT USE OF INSULIN (H): ICD-10-CM

## 2022-05-27 DIAGNOSIS — Z12.31 VISIT FOR SCREENING MAMMOGRAM: ICD-10-CM

## 2022-05-27 DIAGNOSIS — E11.22 TYPE 2 DIABETES MELLITUS WITH STAGE 3A CHRONIC KIDNEY DISEASE, WITHOUT LONG-TERM CURRENT USE OF INSULIN (H): ICD-10-CM

## 2022-05-27 DIAGNOSIS — R60.9 EDEMA, UNSPECIFIED TYPE: ICD-10-CM

## 2022-05-27 DIAGNOSIS — E78.5 HYPERLIPIDEMIA LDL GOAL <70: ICD-10-CM

## 2022-05-27 DIAGNOSIS — M48.062 SPINAL STENOSIS OF LUMBAR REGION WITH NEUROGENIC CLAUDICATION: Primary | ICD-10-CM

## 2022-05-27 DIAGNOSIS — N18.31 STAGE 3A CHRONIC KIDNEY DISEASE (H): ICD-10-CM

## 2022-05-27 PROCEDURE — 99214 OFFICE O/P EST MOD 30 MIN: CPT | Mod: 95 | Performed by: INTERNAL MEDICINE

## 2022-05-27 RX ORDER — METHYLPREDNISOLONE 4 MG
TABLET, DOSE PACK ORAL
Qty: 21 TABLET | Refills: 0 | Status: SHIPPED | OUTPATIENT
Start: 2022-05-27 | End: 2022-08-13

## 2022-05-27 NOTE — PROGRESS NOTES
Cristela is a 81 year old who is being evaluated via a billable telephone visit.      What phone number would you like to be contacted at? [phone]  How would you like to obtain your AVS? Catalina    ASSESSMENT:    1. Spinal stenosis of lumbar region with neurogenic claudication  Worsening back pain.  MRI lumbar spine with scoliosis and L3-4 subluxation and severe spinal stenosis along with some bilateral foraminal stenosis.  Patient to stop NSAIDs.  We will have her see neurosurgery to discuss treatment options.  Short-term Medrol Dosepak for comfort  - Neurosurgery Referral; Future  - methylPREDNISolone (MEDROL DOSEPAK) 4 MG tablet therapy pack; Follow Package Directions  Dispense: 21 tablet; Refill: 0    2. Type 2 diabetes mellitus with stage 3a chronic kidney disease, without long-term current use of insulin (H)  Previously controlled with diet therapy.  Not checking blood sugars at home.  A1c lab as previously ordered in the next week.  Discussed with patient that Medrol Dosepak will raise blood sugars over short-term but feel potential symptom benefit outweigh short-term blood sugar rise.  Further management based on A1c result    3. Stage 3a chronic kidney disease (H)  History of CKD.  Has been using NSAIDs recently per patient report.  Patient structured to discontinue NSAIDs.  BMP lab in the next week as previously ordered    4. Hyperlipidemia LDL goal <70  On maximum dose statin.  Previous lipids above goal.  Recheck in the next week with lipid panel as previously ordered.  If LDL remains greater than 70, will add Zetia    5. Edema, unspecified type  Patient denies shortness of breath, orthopnea.  Possibly due to NSAIDs.  Patient to discontinue Aleve/ibuprofen and monitor swelling over the next week.  If not improving, she will let me know and then add Lasix    6. Visit for screening mammogram  Patient wishes to continue screening.  Mammogram ordered  - *MA Screening Digital Bilateral;  "Future      PLAN:  Stop using Aleve/ibuprofen.  Okay for Tylenol   Medrol steroid Dosepak over 6 days to reduce back inflammation  Monitor leg swelling over the next week.  If not improving off of ibuprofen/Aleve, then contact clinic and will use short-term course of furosemide (diuretic) to reduce leg swelling  Call  [phone] or use [facility] to schedule a future lab appointment  fasting in the next 1 week.   Use [facility] or call [phone] to schedule a mammogram appointment at the clinic  Continue other medications pending lab results.  Call pharmacy if refills needed  Appointment with [facility] regarding spinal scoliosis/stenosis of the lumbar spine. Pt to call for appt    [facility]   [address]   [place] [address]   Phone: [phone]       Phone call contact time  Call Started at 4:18PM  Call Ended at 4:38PM  Total minutes: 20 min    (Chart documentation was completed, in part, with [employer] voice-recognition software. Even though reviewed, some grammatical, spelling, and word errors may remain.)    Jose De Jesus Snow MD  Internal Medicine Department  [facility]              (Chart documentation was completed, in part, with [employer] voice-recognition software. Even though reviewed, some grammatical, spelling, and word errors may remain.)    Jose De Jesus Snow MD  Internal Medicine Department  [facility]      Wilton Archibald is a 81 year old who presents for the following health issues     HPI     Chief Complaint   Patient presents with     Chronic Leg pain     Swelling in legs.     MRI Transcription     Patient wants to discuss MRI results.             Most recent MRI LS spine reviewed with pt:\"    MRI OF THE LUMBAR SPINE WITHOUT CONTRAST February 15, 2022 3:21 PM      HISTORY: Low back pain with worsening right lower extremity radicular  pain.     TECHNIQUE: Multiplanar, multisequence MRI " images of the lumbar spine  were acquired without IV contrast.     COMPARISON: None.     FINDINGS: There are five lumbar-type vertebrae for the purposes of  this dictation.      Five lumbar type vertebral bodies. Normal vertebral body heights. 25  degree levoscoliosis apex at L2-L3. Mild Modic 1 marrow change in the  L3-L4 apposing endplate on the right. The conus tip is identified at  L1-L2. Visualized extraspinal soft tissues are within normal limits.     T12-L1: Moderate to severe disc height and T2 signal loss. Mild  circumferential disc osteophyte complex. Mild bilateral facet  arthropathy. No spinal canal or neural foraminal stenosis.      L1-L2: Moderate to severe disc height and T2 signal loss. Mild  posterior annular bulge. Mild bilateral facet arthropathy. No spinal  canal or neural foraminal stenosis.     L2-L3: Moderate to severe disc height and T2 signal loss. Mild  circumferential disc osteophyte complex. Mild bilateral facet  arthropathy and thickening of ligamenta flava. No spinal canal  stenosis. No neural foraminal stenosis.     L3-L4: 9 mm leftward subluxation. Moderate disc height and T2 signal  loss. Mild-to-moderate circumferential disc osteophyte complex. Mild  bilateral facet arthropathy. Moderate to severe spinal canal stenosis.  Severe right and mild to moderate left neural foraminal stenosis.      L4-L5: 2 mm degenerative anterolisthesis. Mild disc height loss.  Moderate disc T2 signal loss. Mild posterior annular bulge and left  foraminal disc osteophyte complex. Moderate bilateral facet  arthropathy and thickening of ligamenta flava. Mild/moderate spinal  canal stenosis in a trefoil configuration. No right neural foraminal  stenosis. Mild left neural foraminal stenosis.     L5-S1: Normal disc height. Moderate disc T2 signal loss. Mild  posterior annular bulge. Small caudally directed left central disc  herniation. Mild bilateral facet arthropathy. Mild left lateral recess  stenosis. No  central spinal canal stenosis. No right neural foraminal  stenosis. Mild left neural foraminal stenosis.                                                                      IMPRESSION:  1.  25 degree levoscoliosis apex at L2-L3.  2.  9 mm L3-L4 leftward subluxation with moderate to severe spinal  canal stenosis, severe right neural foraminal stenosis and mild to  moderate left neural from stenosis.  3.  Mild/moderate L4-L5 spinal canal stenosis in a trefoil  configuration.  4.  Mild left L5-S1 lateral recess stenosis.     ESTRELLITA ROSE MD        Patient complains of pain in the low back with radiation right lower extremity to the toes and now having some left lower extremity radiation also to the foot.  Patient can walk about 2 blocks before she has to stop because of pain in her legs.  Over the last 2 weeks, she has developed mild swelling in her legs.  Consuming a low-salt diet.  Denies orthopnea or PND.  No chest pain or shortness of breath.  Patient does have a history of some CKD and has been taking Aleve or ibuprofen about 3 times a day over the last few weeks and started the NSAIDs prior to edema development.  Denies bowel or bladder incontinence.  Legs feel a little bit weaker to her.  Chronic low back pain  History diet-controlled diabetes.  Not checking blood sugars recently    Component      Latest Ref Rng & Units 12/21/2021 2/8/2022   Sodium      133 - 144 mmol/L 135 139   Potassium      3.4 - 5.3 mmol/L 4.0 3.7   Chloride      94 - 109 mmol/L 105 107   Carbon Dioxide      20 - 32 mmol/L 25 25   Anion Gap      3 - 14 mmol/L 5 7   Urea Nitrogen      7 - 30 mg/dL 15 16   Creatinine      0.52 - 1.04 mg/dL 1.05 (H) 1.21 (H)   Calcium      8.5 - 10.1 mg/dL 9.5 8.8   Glucose      70 - 99 mg/dL 157 (H) 162 (H)   Alkaline Phosphatase      40 - 150 U/L  86   AST      0 - 45 U/L  13   ALT      0 - 50 U/L  18   Protein Total      6.8 - 8.8 g/dL  6.7 (L)   Albumin      3.4 - 5.0 g/dL  3.5   Bilirubin Total       0.2 - 1.3 mg/dL  0.4   GFR Estimate      >60 mL/min/1.73m2 53 (L) 45 (L)   WBC      4.0 - 11.0 10e3/uL  6.1   RBC Count      3.80 - 5.20 10e6/uL  4.25   Hemoglobin      11.7 - 15.7 g/dL  12.1   Hematocrit      35.0 - 47.0 %  38.1   MCV      78 - 100 fL  90   MCH      26.5 - 33.0 pg  28.5   MCHC      31.5 - 36.5 g/dL  31.8   RDW      10.0 - 15.0 %  13.2   Platelet Count      150 - 450 10e3/uL  243   Cholesterol      <200 mg/dL 214 (H)    Triglycerides      <150 mg/dL 146    HDL Cholesterol      >=50 mg/dL 74    LDL Cholesterol Calculated      <=100 mg/dL 111 (H)    Non HDL Cholesterol      <130 mg/dL 140 (H)    Patient Fasting > 8hrs?       Yes    Creatinine Urine      mg/dL 89    Albumin Urine mg/L      mg/L 16    Albumin Urine mg/g Cr      0.00 - 25.00 mg/g Cr 17.98    Hemoglobin A1C      0.0 - 5.6 % 6.8 (H)    TSH      0.40 - 4.00 mU/L  2.65   Sed Rate      0 - 30 mm/hr  13   Cortisol Serum      4.0 - 22.0 ug/dL  7.7           Additional ROS:   Constitutional, HEENT, Cardiovascular, Pulmonary, GI and , Neuro, MSK and Psych review of systems/symptoms are otherwise negative or unchanged from previous, except as noted above.           Objective:  Any reported vitals from today were reviewed in chart. See nursing documentation for details    RESP: No cough, no audible wheezing, able to talk in full sentences  GEN: Normal affect. Normal though content/speech  Remainder of exam unable to be completed due to telephone visits

## 2022-05-28 ENCOUNTER — LAB (OUTPATIENT)
Dept: LAB | Facility: CLINIC | Age: 82
End: 2022-05-28
Payer: COMMERCIAL

## 2022-05-28 DIAGNOSIS — N18.31 STAGE 3A CHRONIC KIDNEY DISEASE (H): ICD-10-CM

## 2022-05-28 DIAGNOSIS — E11.22 TYPE 2 DIABETES MELLITUS WITH STAGE 3A CHRONIC KIDNEY DISEASE, WITHOUT LONG-TERM CURRENT USE OF INSULIN (H): ICD-10-CM

## 2022-05-28 DIAGNOSIS — N18.31 TYPE 2 DIABETES MELLITUS WITH STAGE 3A CHRONIC KIDNEY DISEASE, WITHOUT LONG-TERM CURRENT USE OF INSULIN (H): ICD-10-CM

## 2022-05-28 DIAGNOSIS — E78.5 HYPERLIPIDEMIA LDL GOAL <70: ICD-10-CM

## 2022-05-28 LAB
ANION GAP SERPL CALCULATED.3IONS-SCNC: 5 MMOL/L (ref 3–14)
BUN SERPL-MCNC: 13 MG/DL (ref 7–30)
CALCIUM SERPL-MCNC: 8.9 MG/DL (ref 8.5–10.1)
CHLORIDE BLD-SCNC: 109 MMOL/L (ref 94–109)
CHOLEST SERPL-MCNC: 207 MG/DL
CO2 SERPL-SCNC: 25 MMOL/L (ref 20–32)
CREAT SERPL-MCNC: 1.03 MG/DL (ref 0.52–1.04)
FASTING STATUS PATIENT QL REPORTED: YES
GFR SERPL CREATININE-BSD FRML MDRD: 55 ML/MIN/1.73M2
GLUCOSE BLD-MCNC: 134 MG/DL (ref 70–99)
HBA1C MFR BLD: 6.7 % (ref 0–5.6)
HDLC SERPL-MCNC: 85 MG/DL
LDLC SERPL CALC-MCNC: 96 MG/DL
NONHDLC SERPL-MCNC: 122 MG/DL
POTASSIUM BLD-SCNC: 4.1 MMOL/L (ref 3.4–5.3)
SODIUM SERPL-SCNC: 139 MMOL/L (ref 133–144)
TRIGL SERPL-MCNC: 132 MG/DL

## 2022-05-28 PROCEDURE — 80048 BASIC METABOLIC PNL TOTAL CA: CPT

## 2022-05-28 PROCEDURE — 83036 HEMOGLOBIN GLYCOSYLATED A1C: CPT

## 2022-05-28 PROCEDURE — 80061 LIPID PANEL: CPT

## 2022-05-28 PROCEDURE — 36415 COLL VENOUS BLD VENIPUNCTURE: CPT

## 2022-05-31 NOTE — PATIENT INSTRUCTIONS
Stop using Aleve/ibuprofen.  Okay for Tylenol   Medrol steroid Dosepak over 6 days to reduce back inflammation  Monitor leg swelling over the next week.  If not improving off of ibuprofen/Aleve, then contact clinic and will use short-term course of furosemide (diuretic) to reduce leg swelling  Call  [phone] or use [employer] to schedule a future lab appointment  fasting in the next 1 week.   Use [employer] or call [phone] to schedule a mammogram appointment at the clinic  Continue other medications pending lab results.  Call pharmacy if refills needed  Appointment with [facility] regarding spinal scoliosis/stenosis of the lumbar spine. Pt to call for appt    [facility]   [address] [address] [facility] [address] [place] [id number]   Phone: [phone]

## 2022-06-02 NOTE — TELEPHONE ENCOUNTER
"Dr. Snow,     Pt calling looking for your recommendations regarding the Lumbar MRI done on 2/15.    See results, & (impression notes) listed below.      \"IMPRESSION:  1.  25 degree levoscoliosis apex at L2-L3.  2.  9 mm L3-L4 leftward subluxation with moderate to severe spinal  canal stenosis, severe right neural foraminal stenosis and mild to  moderate left neural from stenosis.  3.  Mild/moderate L4-L5 spinal canal stenosis in a trefoil  configuration.  4.  Mild left L5-S1 lateral recess stenosis.\"      Please call pt back, if  Krzysztof answers, ok to give him message.  "
Patient is asking of pcp would please call her regarding these results.  
See appt note 5/27/22. Discussed with pt and referred to neurosurgery  
operating room

## 2022-06-08 ENCOUNTER — OFFICE VISIT (OUTPATIENT)
Dept: NEUROSURGERY | Facility: CLINIC | Age: 82
End: 2022-06-08
Attending: INTERNAL MEDICINE
Payer: COMMERCIAL

## 2022-06-08 VITALS — HEART RATE: 64 BPM | DIASTOLIC BLOOD PRESSURE: 66 MMHG | SYSTOLIC BLOOD PRESSURE: 132 MMHG | OXYGEN SATURATION: 97 %

## 2022-06-08 DIAGNOSIS — M48.062 SPINAL STENOSIS OF LUMBAR REGION WITH NEUROGENIC CLAUDICATION: ICD-10-CM

## 2022-06-08 PROCEDURE — G0463 HOSPITAL OUTPT CLINIC VISIT: HCPCS

## 2022-06-08 PROCEDURE — 99204 OFFICE O/P NEW MOD 45 MIN: CPT | Performed by: STUDENT IN AN ORGANIZED HEALTH CARE EDUCATION/TRAINING PROGRAM

## 2022-06-08 RX ORDER — TIZANIDINE 2 MG/1
2 TABLET ORAL 3 TIMES DAILY PRN
Qty: 90 TABLET | Refills: 1 | Status: SHIPPED | OUTPATIENT
Start: 2022-06-08 | End: 2022-07-02

## 2022-06-08 RX ORDER — GABAPENTIN 300 MG/1
300 CAPSULE ORAL 3 TIMES DAILY
Qty: 90 CAPSULE | Refills: 1 | Status: SHIPPED | OUTPATIENT
Start: 2022-06-08 | End: 2022-08-26

## 2022-06-08 ASSESSMENT — PAIN SCALES - GENERAL: PAINLEVEL: MODERATE PAIN (4)

## 2022-06-08 NOTE — PROGRESS NOTES
"HPI:  81-year-old female with new back and right leg pain starting in February 2022.  Pain started when she was walking up stairs and all of a sudden had her right leg go numb and could not walk anymore.  She describes the pain is mostly in her low back and down her right leg.  She occasionally gets some pain in the left leg.  Pain is worse with turning over in bed and getting up initially.  It also gets worse with going to a standing position from a sitting position and then stretching her back out while sitting also makes the pain worse.  The pain in her leg runs down the lateral aspect of her thigh and to the anterior aspect of her lower leg not passing the ankle.  She recently underwent a stretch of oral steroids which helped while taking the medication but the next day did some yard work and had recurrence of the pain.  She is also been taking ibuprofen and Aleve but had to stop due to potential kidney issues.  She has had no prior surgery and no injections as well as no physical therapy.  Current Outpatient Medications   Medication     albuterol (PROAIR HFA/PROVENTIL HFA/VENTOLIN HFA) 108 (90 Base) MCG/ACT inhaler     aspirin 81 MG tablet     diltiazem ER COATED BEADS (CARDIZEM CD) 360 MG 24 hr capsule     fluticasone-salmeterol (ADVAIR DISKUS) 500-50 MCG/DOSE inhaler     hydrALAZINE (APRESOLINE) 50 MG tablet     labetalol (NORMODYNE) 100 MG tablet     losartan (COZAAR) 100 MG tablet     omeprazole (PRILOSEC) 40 MG DR capsule     rosuvastatin (CRESTOR) 40 MG tablet     vitamin D3 (CHOLECALCIFEROL) 50 mcg (2000 units) tablet     methylPREDNISolone (MEDROL DOSEPAK) 4 MG tablet therapy pack     No current facility-administered medications for this visit.      Physical Exam:  Vital signs:      BP: 132/66 Pulse: 64     SpO2: 97 %          Estimated body mass index is 24.12 kg/m  as calculated from the following:    Height as of 2/8/22: 1.588 m (5' 2.5\").    Weight as of 2/8/22: 60.8 kg (134 lb).   She has full " strength in her bilateral upper and lower extremities.  Sensation is intact to light touch throughout.  Her left patellar reflexes 2+ and her right patellar reflexes 1+ as well as her bilateral ankle reflexes being 1+.   Results Reviewed:  I personally viewed the images of an MRI of the lumbar spine showing multilevel degenerative spondylosis with degenerative scoliosis.  She has severe central canal stenosis at L2-3, L3-4 and L4-5 with a grade 1 spondylolisthesis at L4-5.  There is a 29 degree levoscoliosis with the apex at approximately L2 in the recumbent position on the MRI.  Assessment:  81-year-old female with lumbar spondylosis and degenerative scoliosis with right leg radiculopathy  Plan:  Discussed conservative and surgical management options going forward.  She has not tried any physical therapy or injections or prescription medications.  We will start her with gabapentin and Zanaflex for her leg and back pain.  I also provided a referral to physical therapy as well as my colleague and medical spine for an injection and potential other therapies for her low back and leg pain.  Should she not improve we discussed that there may be surgical options to help her.  This would likely however be a multilevel lumbar fusion procedure.  I will get flexion-extension films as well as scoliosis films to determine what type of procedure would be needed should she fail conservative management.  I instructed her to let me know if she would like to be seen again should she fail conservative management and want to discuss surgery options.    Dipesh Méndez MD

## 2022-06-08 NOTE — PATIENT INSTRUCTIONS
Patient Next Steps:    Order placed for physical therapy. You can call the phone number highlighted in the order to schedule your appointment. Please call our clinic if symptoms persist after your course of physical therapy.    Order placed for x-rays. Please obtain these on the way out today    Dr Méndez has sent the medications Gabapentin and Zanaflex electronically to you pharmacy.     Dr Méndez would like to see you back in the clinic on an as needed basis if your symptoms persist. Please call the number below to schedule.     A referral has been placed for you to see Dr. Mathis with [facility].     Please call us if you have any further questions or concerns.    [facility]   Phone: [phone]  Fax: [phone]

## 2022-06-08 NOTE — NURSING NOTE
"Cristela Salgado is a 81 year old female who presents for:  Chief Complaint   Patient presents with     Neurologic Problem     L3-4 central spinal stenosis w/ scoliosis/arterolisthesis and BLE radiculopathy        Initial Vitals:  /66   Pulse 64   SpO2 97%  Estimated body mass index is 24.12 kg/m  as calculated from the following:    Height as of 2/8/22: 5' 2.5\" (1.588 m).    Weight as of 2/8/22: 134 lb (60.8 kg).. There is no height or weight on file to calculate BSA. BP completed using cuff size: large  Moderate Pain (4)    Nursing Comments: 4/10 pain. LBP to JERRY LE. R Leg more pain/numbness. Medrol Dosepak helped relief, but when finished, pain came back.    Amrit Lainez MA    "

## 2022-06-08 NOTE — LETTER
6/8/2022         RE: Cristela Salgado  [address]  [place] [address]        Dear Colleague,    Thank you for referring your patient, Cristela Salgado, to the [facility] [place]. Please see a copy of my visit note below.    HPI:  81-year-old female with new back and right leg pain starting in February 2022.  Pain started when she was walking up stairs and all of a sudden had her right leg go numb and could not walk anymore.  She describes the pain is mostly in her low back and down her right leg.  She occasionally gets some pain in the left leg.  Pain is worse with turning over in bed and getting up initially.  It also gets worse with going to a standing position from a sitting position and then stretching her back out while sitting also makes the pain worse.  The pain in her leg runs down the lateral aspect of her thigh and to the anterior aspect of her lower leg not passing the ankle.  She recently underwent a stretch of oral steroids which helped while taking the medication but the next day did some yard work and had recurrence of the pain.  She is also been taking ibuprofen and Aleve but had to stop due to potential kidney issues.  She has had no prior surgery and no injections as well as no physical therapy.  Current Outpatient Medications   Medication     albuterol (PROAIR HFA/PROVENTIL HFA/VENTOLIN HFA) 108 (90 Base) MCG/ACT inhaler     aspirin 81 MG tablet     diltiazem ER COATED BEADS (CARDIZEM CD) 360 MG 24 hr capsule     fluticasone-salmeterol (ADVAIR DISKUS) 500-50 MCG/DOSE inhaler     hydrALAZINE (APRESOLINE) 50 MG tablet     labetalol (NORMODYNE) 100 MG tablet     losartan (COZAAR) 100 MG tablet     omeprazole (PRILOSEC) 40 MG DR capsule     rosuvastatin (CRESTOR) 40 MG tablet     vitamin D3 (CHOLECALCIFEROL) 50 mcg (2000 units) tablet     methylPREDNISolone (MEDROL DOSEPAK) 4 MG tablet therapy pack     No current facility-administered  "medications for this visit.      Physical Exam:  Vital signs:      BP: 132/66 Pulse: 64     SpO2: 97 %          Estimated body mass index is 24.12 kg/m  as calculated from the following:    Height as of 2/8/22: 1.588 m (5' 2.5\").    Weight as of 2/8/22: 60.8 kg (134 lb).   She has full strength in her bilateral upper and lower extremities.  Sensation is intact to light touch throughout.  Her left patellar reflexes 2+ and her right patellar reflexes 1+ as well as her bilateral ankle reflexes being 1+.   Results Reviewed:  I personally viewed the images of an MRI of the lumbar spine showing multilevel degenerative spondylosis with degenerative scoliosis.  She has severe central canal stenosis at L2-3, L3-4 and L4-5 with a grade 1 spondylolisthesis at L4-5.  There is a 29 degree levoscoliosis with the apex at approximately L2 in the recumbent position on the MRI.  Assessment:  81-year-old female with lumbar spondylosis and degenerative scoliosis with right leg radiculopathy  Plan:  Discussed conservative and surgical management options going forward.  She has not tried any physical therapy or injections or prescription medications.  We will start her with gabapentin and Zanaflex for her leg and back pain.  I also provided a referral to physical therapy as well as my colleague and medical spine for an injection and potential other therapies for her low back and leg pain.  Should she not improve we discussed that there may be surgical options to help her.  This would likely however be a multilevel lumbar fusion procedure.  I will get flexion-extension films as well as scoliosis films to determine what type of procedure would be needed should she fail conservative management.  I instructed her to let me know if she would like to be seen again should she fail conservative management and want to discuss surgery options.    Dipesh Méndez MD      Again, thank you for allowing me to participate in the care of your patient.  "       Sincerely,        Dipesh Méndez MD

## 2022-06-16 ENCOUNTER — TELEPHONE (OUTPATIENT)
Dept: NEUROSURGERY | Facility: CLINIC | Age: 82
End: 2022-06-16
Payer: COMMERCIAL

## 2022-06-16 NOTE — TELEPHONE ENCOUNTER
Patient states she is on new meds from Dr. Méndez- and is experiencing symptoms of swelling.  Patient concerned and would like a call back

## 2022-06-16 NOTE — TELEPHONE ENCOUNTER
Last Visit: 6/8/22    Next Visit: yudy    Name of Provider:  Dr Méndez    Assessment: Patient was prescribed Neurontin by Dr Méndez on 6/8/22. She calls today to report some swelling in her ankles and feet.   She noticed this after starting the Neurontin.   Last night, she did not take the HS dose and woke up with no swelling. She took the am dose and developed swelling. She then stopped taking the medication.  She denies any swelling anywhere else  She asks if there is anything else for pain until she see's Dr Mathis in a few weeks.    Recommendation given: Stop Gabapentin. Unable to order any pain medications. Recommend tylenol, ice, heat, rest and follow up with Dr Mathis as planned. She is to start PT next week as well. She agrees to update our office if her symptoms worsen    Action needed from provider: yudy

## 2022-06-20 ENCOUNTER — THERAPY VISIT (OUTPATIENT)
Dept: PHYSICAL THERAPY | Facility: CLINIC | Age: 82
End: 2022-06-20
Attending: STUDENT IN AN ORGANIZED HEALTH CARE EDUCATION/TRAINING PROGRAM
Payer: COMMERCIAL

## 2022-06-20 DIAGNOSIS — G89.29 CHRONIC BILATERAL LOW BACK PAIN WITH BILATERAL SCIATICA: ICD-10-CM

## 2022-06-20 DIAGNOSIS — M54.41 CHRONIC BILATERAL LOW BACK PAIN WITH BILATERAL SCIATICA: ICD-10-CM

## 2022-06-20 DIAGNOSIS — M48.062 SPINAL STENOSIS OF LUMBAR REGION WITH NEUROGENIC CLAUDICATION: ICD-10-CM

## 2022-06-20 DIAGNOSIS — M54.42 CHRONIC BILATERAL LOW BACK PAIN WITH BILATERAL SCIATICA: ICD-10-CM

## 2022-06-20 PROCEDURE — 97161 PT EVAL LOW COMPLEX 20 MIN: CPT | Mod: GP | Performed by: PHYSICAL THERAPIST

## 2022-06-20 PROCEDURE — 97110 THERAPEUTIC EXERCISES: CPT | Mod: GP | Performed by: PHYSICAL THERAPIST

## 2022-06-20 NOTE — PROGRESS NOTES
"Physical Therapy Initial Evaluation  Subjective:  The history is provided by the patient. No  was used.   Patient Health History  Cristela Salgado being seen for \"spinal stenosis\".     Problem began: 2/20/2022.   Problem occurred: unknown   Pain score: ranges 0-10/10.  General health as reported by patient is good.  Pertinent medical history includes: asthma, high blood pressure, kidney disease and numbness/tingling.   Red flags:  Pain at rest/night.  Medical allergies: none.    Other surgery history details: hernia surgery.    Current medications:  High blood pressure medication and muscle relaxants.    Current occupation is retired.   Primary job tasks include:  Driving and lifting/carrying.                  Therapist Generated HPI Evaluation  Problem details: LBP and bilateral leg pain starting in February 2022.  Pain started when she was walking up stairs and all of a sudden both of her legs went numb and could not walk.  She describes the pain had been mostly in her low back and down her right leg and now equally on her left leg. CC is pain with getting out of bed in the morning, gets severe back and B leg pain. Sits with ice which helps. Walking tolerance is 10 minutes and then both legs go numb and weak. Standing tolerance varies, 5 minutes at its worst. Prednisone pack in May was very helpful but pain returned 4-5 days after completion of pack. Will discuss injection next MD visit.         Type of problem:  Lumbar.    This is a chronic condition.    Where condition occurred: at home.  Patient reports pain:  Lumbar spine right and lumbar spine left.  Pain is described as shooting and is intermittent.  Pain radiates to:  Gluteals right, thigh left, thigh right, knee left, lower leg left and lower leg right. Pain is worse in the A.M..    Associated symptoms:  Loss of strength and numbness. Symptoms are exacerbated by walking, twisting and bending  and relieved by rest and ice.      Barriers " "include:  None as reported by patient.                        Objective:  Standing Alignment:    Cervical/Thoracic:  Forward head  Shoulder/UE:  Rounded shoulders  Lumbar:  Lordosis decr            Gait:    Assistive Devices:  None  Deviations:  General Deviations:  Izzy decr and stride length decr               Lumbar/SI Evaluation  ROM:    AROM Lumbar:   Flexion:          Hands nearly to ankles (R buttock pain)  Ext:                    25% (R LBP)   Side Bend:        Left:     Right:   Rotation:           Left:     Right:   Side Glide:        Left:  50%    Right:  50% (R LBP/lateral thigh)          Lumbar Myotomes:  Lumbar myotomes: can heel and toe walk with UE support for balance.                                                                         General     ROS    -Started with no sx's seated. Corrected posture with lumbar roll and it \"felt good\", no back or leg sx's.  -AMADA x 10: produced R LBP /NW after, increasing motion through reps, still ERP with RSG after.  -RFIsitting x 10: produced R LBP/B buttock and thigh pain during/worse after, incr ERP with RSG after.  -RFIL x 5--had to stop d/t increasing B LBP/B buttock pain.  Supine rotation increases sx's.  Trial of bridge painful.    Assessment/Plan:    Patient is a 81 year old female with lumbar complaints.    Patient has the following significant findings with corresponding treatment plan.                Diagnosis 1:  B LBP/B legs  Pain -  manual therapy, self management, education, directional preference exercise and home program  Decreased ROM/flexibility - manual therapy and therapeutic exercise  Impaired gait - gait training  Decreased function - therapeutic activities  Impaired posture - neuro re-education    Therapy Evaluation Codes:   Cumulative Therapy Evaluation is: Low complexity  Previous and current functional limitations:  (See Goal Flow Sheet for this information)    Short term and Long term goals: (See Goal Flow Sheet for this " information)     Communication ability:  Patient appears to be able to clearly communicate and understand verbal and written communication and follow directions correctly.  Treatment Explanation - The following has been discussed with the patient:   RX ordered/plan of care  Anticipated outcomes  Possible risks and side effects  This patient would benefit from PT intervention to resume normal activities.   Rehab potential is good.    Frequency:  1 X week, once daily  Duration:  for 8 weeks  Discharge Plan:  Achieve all LTG.  Independent in home treatment program.  Reach maximal therapeutic benefit.    Please refer to the daily flowsheet for treatment today, total treatment time and time spent performing 1:1 timed codes.

## 2022-06-20 NOTE — PROGRESS NOTES
[facility]    OUTPATIENT Physical Therapy ORTHOPEDIC EVALUATION  PLAN OF TREATMENT FOR OUTPATIENT REHABILITATION  (COMPLETE FOR INITIAL CLAIMS ONLY)  Patient's Last Name, First Name, M.I.  YOB: 1940  Cristela Salgado    Provider s Name:  [facility]   Medical Record No.  [id number]   Start of Care Date:  06/20/22   Onset Date:   02/20/22   Type:     _X__PT   ___OT Medical Diagnosis:    Encounter Diagnoses   Name Primary?    Spinal stenosis of lumbar region with neurogenic claudication     Chronic bilateral low back pain with bilateral sciatica         Treatment Diagnosis:  B LBP/B legs        Goals:     06/20/22 0500   Body Part   Goals listed below are for LBP/legs   Goal #1   Goal #1 ambulation   Previous Functional Level No restrictions   Current Functional Level Minutes patient can walk   Performance Level 10, then B leg numbness/weakness   STG Target Performance Minutes patient will be able to walk   Performance Level 20   Rationale for safe community ambulation   Due Date 07/11/22    LTG Target Performance Minutes patient will be able to  walk   Performance Level 30   Rationale for safe community ambulation;to promote a healthy and active lifestyle   Due Date 08/15/22   Goal #2   Goal #2 self cares/transfers/bed mobility   Previous Functional Level No restrictions   Current Functional Level Unable ;to transfer in and out of a bed   Performance Level without 10/10 pain   STG Be able to ; transfer in and out of a bed   STG Performance Level with 5/10 pain or better   Rationale for independent living   Due Date 07/11/22   LTG Target Performance Be able to ; transfer in and out of a bed   Performance level with 0/10 pain   Rationale for independent living   Due Date 08/15/22       Therapy Frequency:  1x/week  Predicted Duration of Therapy Intervention:  8  queta Post, PT                 I CERTIFY THE NEED FOR THESE SERVICES FURNISHED UNDER        THIS PLAN OF TREATMENT AND WHILE UNDER MY CARE     (Physician attestation of this document indicates review and certification of the therapy plan).                     Certification Date From:  06/20/22   Certification Date To:  08/15/22    Referring Provider:  Dipesh Méndez    Initial Assessment        See Epic Evaluation SOC Date: 06/20/22

## 2022-06-24 ENCOUNTER — NURSE TRIAGE (OUTPATIENT)
Dept: INTERNAL MEDICINE | Facility: CLINIC | Age: 82
End: 2022-06-24

## 2022-06-24 ENCOUNTER — TELEPHONE (OUTPATIENT)
Dept: INTERNAL MEDICINE | Facility: CLINIC | Age: 82
End: 2022-06-24

## 2022-06-24 DIAGNOSIS — M48.00 SPINAL STENOSIS, UNSPECIFIED SPINAL REGION: Primary | ICD-10-CM

## 2022-06-24 RX ORDER — METHYLPREDNISOLONE 4 MG
TABLET, DOSE PACK ORAL
Qty: 21 TABLET | Refills: 0 | Status: SHIPPED | OUTPATIENT
Start: 2022-06-24 | End: 2022-08-13

## 2022-06-24 NOTE — TELEPHONE ENCOUNTER
Will treat with single 6 day course of medrol steroid dose pack and see ortho as scheduled 7/8/22. If worsens again between now and then, pt to contact neurosurgery office (who she saw 6/8/22)  to be seen back by them also to reassess treatment plan

## 2022-06-24 NOTE — TELEPHONE ENCOUNTER
Called patient back to discuss Tele Enc 6/24/22      6/24 AM Fell coming inside from gardening  Had to crawl into the house  States 'Legs went out again'   Legs felt paralyzed    No pain  Had numbness Right leg from hip down to foot  Had some feeling in Left leg from hip down to foot  No loss of bladder/bowel control    Patient is back to baseline status  Patient has regained complete feeling and control of legs, is able to walk currently        Per disposition, patient to be seen in next 3 days. Patient declines, stating this is not new and she's waiting to be seen by neurology at the soonest available appointment 7/8/22.    Previously placed on Prednisone pack and worked well. Pt requesting a low-dose until able to be seen 7/8/22. Pt is unable to be seen sooner.           Can we leave a detailed message on this number? YES  Phone number patient can be reached at: Home number on file [phone] (home)    Libertad Gonzalez RN  [facility] Triage      Reason for Disposition    Numbness or tingling in one or both feet is a chronic symptom (recurrent or ongoing problem lasting > 4 weeks)    Additional Information    Negative: Difficult to awaken or acting confused (e.g., disoriented, slurred speech)    Negative: New neurologic deficit that is present NOW, sudden onset of ANY of the following: * Weakness of the face, arm, or leg on one side of the body* Numbness of the face, arm, or leg on one side of the body* Loss of speech or garbled speech    Negative: Sounds like a life-threatening emergency to the triager    Negative: Confusion, disorientation, or hallucinations is the main symptom    Negative: Dizziness is the main symptom    Negative: Followed a head injury within last 3 days    Negative: Headache (with neurologic deficit)    Negative: Unable to urinate (or only a few drops) and bladder feels very full    Negative: Loss of control of bowel or bladder (i.e., incontinence) of new onset     Negative: Back pain with numbness (loss of sensation) in groin or rectal area    Negative: Patient sounds very sick or weak to the triager    Negative: Neurologic deficit that was brief (now gone), ANY of the following: * Weakness of the face, arm, or leg on one side of the body * Numbness of the face, arm, or leg on one side of the body * Loss of speech or garbled speech    Negative: Neurologic deficit of gradual onset, ANY of the following: * Weakness of the face, arm, or leg on one side of the body * Numbness of the face, arm, or leg on one side of the body * Loss of speech or garbled speech    Negative: [place] palsy suspected (i.e., weakness only one side of the face, developing over hours to days, no other symptoms)    Negative: Tingling (e.g., pins and needles) of the face, arm or leg on one side of the body, that is  present now (Exceptions: chronic/recurrent symptom lasting > 4 weeks or tingling from known cause, such as: bumped elbow, carpal tunnel syndrome, pinched nerve, frostbite)    Negative: Neck pain (with neurologic deficit)    Negative: Back pain (with neurologic deficit)    Negative: Patient wants to be seen    Negative: Loss of speech or garbled speech is a chronic symptom (recurrent or ongoing problem lasting > 4 weeks)    Negative: Weakness of arm or leg is a chronic symptom (recurrent or ongoing problem lasting > 4 weeks)    Negative: Numbness or tingling in one or both hands is a chronic symptom (recurrent or ongoing problem lasting > 4 weeks)    Protocols used: NEUROLOGIC DEFICIT-A-[place]

## 2022-06-24 NOTE — TELEPHONE ENCOUNTER
Patient called, stated that she had fallen and she could not get up. Stated that the pain is unbearable. Patient has referral for Neurological Surgery and is scheduled with them 7/8. Patient is wondering if provider can get her a referral for somewhere sooner?    Patient would like a call back to know if this is possible or not.

## 2022-06-27 ENCOUNTER — THERAPY VISIT (OUTPATIENT)
Dept: PHYSICAL THERAPY | Facility: CLINIC | Age: 82
End: 2022-06-27
Payer: COMMERCIAL

## 2022-06-27 DIAGNOSIS — M54.41 CHRONIC BILATERAL LOW BACK PAIN WITH BILATERAL SCIATICA: Primary | ICD-10-CM

## 2022-06-27 DIAGNOSIS — G89.29 CHRONIC BILATERAL LOW BACK PAIN WITH BILATERAL SCIATICA: Primary | ICD-10-CM

## 2022-06-27 DIAGNOSIS — M54.42 CHRONIC BILATERAL LOW BACK PAIN WITH BILATERAL SCIATICA: Primary | ICD-10-CM

## 2022-06-27 PROCEDURE — 97110 THERAPEUTIC EXERCISES: CPT | Mod: GP | Performed by: PHYSICAL THERAPIST

## 2022-06-27 PROCEDURE — 97530 THERAPEUTIC ACTIVITIES: CPT | Mod: GP | Performed by: PHYSICAL THERAPIST

## 2022-06-27 PROCEDURE — 97112 NEUROMUSCULAR REEDUCATION: CPT | Mod: GP | Performed by: PHYSICAL THERAPIST

## 2022-07-01 DIAGNOSIS — M48.062 SPINAL STENOSIS OF LUMBAR REGION WITH NEUROGENIC CLAUDICATION: ICD-10-CM

## 2022-07-02 RX ORDER — TIZANIDINE 2 MG/1
2 TABLET ORAL 3 TIMES DAILY PRN
Qty: 90 TABLET | Refills: 1 | Status: SHIPPED | OUTPATIENT
Start: 2022-07-02 | End: 2024-03-06

## 2022-07-08 ENCOUNTER — OFFICE VISIT (OUTPATIENT)
Dept: ORTHOPEDICS | Facility: CLINIC | Age: 82
End: 2022-07-08
Payer: COMMERCIAL

## 2022-07-08 VITALS — OXYGEN SATURATION: 97 % | DIASTOLIC BLOOD PRESSURE: 66 MMHG | HEART RATE: 74 BPM | SYSTOLIC BLOOD PRESSURE: 112 MMHG

## 2022-07-08 DIAGNOSIS — M41.9 SCOLIOSIS OF THORACOLUMBAR SPINE, UNSPECIFIED SCOLIOSIS TYPE: ICD-10-CM

## 2022-07-08 DIAGNOSIS — M48.07 LUMBOSACRAL SPINAL STENOSIS: ICD-10-CM

## 2022-07-08 DIAGNOSIS — M47.817 FACET ARTHROPATHY, LUMBOSACRAL: ICD-10-CM

## 2022-07-08 DIAGNOSIS — M51.379 DDD (DEGENERATIVE DISC DISEASE), LUMBOSACRAL: ICD-10-CM

## 2022-07-08 DIAGNOSIS — M54.17 LUMBOSACRAL RADICULOPATHY: Primary | ICD-10-CM

## 2022-07-08 DIAGNOSIS — M43.17 ANTEROLISTHESIS OF LUMBOSACRAL SPINE: ICD-10-CM

## 2022-07-08 PROCEDURE — 99204 OFFICE O/P NEW MOD 45 MIN: CPT | Performed by: PHYSICAL MEDICINE & REHABILITATION

## 2022-07-08 ASSESSMENT — ENCOUNTER SYMPTOMS
EYE PAIN: 0
WHEEZING: 0
HEARTBURN: 0
DIARRHEA: 0
DIZZINESS: 0
WEIGHT GAIN: 1
FEVER: 0
BOWEL INCONTINENCE: 0
DYSURIA: 0
SEIZURES: 0
VOMITING: 0
DIFFICULTY URINATING: 0
PALPITATIONS: 0
TROUBLE SWALLOWING: 0
COUGH: 0
HEMATURIA: 0
BLOOD IN STOOL: 0
SHORTNESS OF BREATH: 0
CONSTIPATION: 0
FATIGUE: 0
NAUSEA: 0
BRUISES/BLEEDS EASILY: 0
SWOLLEN GLANDS: 0
POOR WOUND HEALING: 0
WEIGHT LOSS: 0
HEADACHES: 0
NERVOUS/ANXIOUS: 0
ABDOMINAL PAIN: 0
INSOMNIA: 0
LOSS OF CONSCIOUSNESS: 0
LEG SWELLING: 1
ARTHRALGIAS: 1
DEPRESSION: 0
HOARSE VOICE: 0
MYALGIAS: 1

## 2022-07-08 NOTE — LETTER
"    7/8/2022         RE: Cristela Salgado  [address]  [facility] [address]        Dear Colleague,    Thank you for referring your patient, Cristela Salgado, to the [facility]. Please see a copy of my visit note below.    PHYSICAL MEDICINE & REHABILITATION / MEDICAL SPINE        Date:  Jul 8, 2022    Name:  Cristela Salgado  YOB: 1940  MRN:  [id number]          REASON FOR CONSULTATION:  Lumbar spinal stenosis with neurogenic claudication.        REFERRING PROVIDER:  Dipesh Méndez MD        CHART REVIEW:  Reviewed 06/08/2022 note from Dipesh Méndez MD (neurosurgery).  Ms. Cristela Salgado had back and leg pain beginning in February 2022.  Pain began when she was walking up stairs, and Ms. Cristela Salgado's right leg went numb, and she was unable to walk anymore.  Ms. Cristela Salgado would occasionally get pain into the left leg.  Pain was worse with turning over in bed and getting up initially.  Pain was worse with transitioning from sitting to standing.  Ms. Cristela Salgado was started on gabapentin and tizanidine.  She was referred to physical therapy.  She was referred to medical spine.  If she were to fail conservative management, surgery would require a multilevel lumbar fusion procedure.        HISTORY OF PRESENT ILLNESS:  Ms. Cristela Salgado is an 81-year-old, right-hand-dominant, female.  She is accompanied to today's appointment by her , Mr. Krzysztof Salgado.    Ms. Cristela Salgado began having right low back pain on 12/24/2021.  Right low back pain is intermittent and described as \"sharp.\"  Right low back pain is worsened by standing and walking.  There is intermittent pain radiating into the bilateral lower extremities, right worse than left.  Pain radiates into the right buttock, right lateral hip, right anterior thigh, right anterior leg, stopping at the ankle.  Pain radiates in a similar pattern on the left side, but this is much " less significant.  Ms. Cristela Salgado notes numbness and tingling in the same distribution as her pain.  The numbness and tingling is more pronounced in the left lower extremity than in the right lower extremity.  Ms. Cristela Salgado seems to notice the numbness and tingling the most when she is trying to get out of bed.    Ms. Cristela Salgado currently rates her pain as 4/10.  She is taking gabapentin 300 mg p.o. 3 times daily.  She notes that her legs and feet swell on this medication.  Ms. Cristela Salgado noted benefit from a Medrol Dosepak.  Ms. Cristela Salgado is taking acetaminophen 500 mg approximately 3 times per week.  Ms. Cristela Salgado did not have any benefit with physical therapy.    Ms. Cristela Salgado denies any weakness.  She has had 3 giveaway episodes of her lower extremities leading to falls.  Ms. Cristela Salgado denies any tripping or stumbling.  She denies any other numbness, tingling, pins-and-needles.  Ms. Cristela Salgado denies any saddle anesthesia, bowel incontinence, bladder incontinence.  Ms. Cristela Salgado's bilateral lower extremities become weak and tired after walking half a block.  She notes improvement in her symptoms with leaning forward on a shopping cart.    Ms. Cristela Salgado denies any prior or recent injuries of her low back, pelvis, hips, thighs, knees, legs, ankles, feet, toes.    Ms. Cristela Salgado denies any personal or family history of autoimmune diseases, rheumatologic diseases, gout, pseudogout.  She denies any personal or family history of neurologic diseases.  Ms. Cristela Salgado denies any personal or family history of inflammatory bowel diseases.        REVIEW OF SYSTEMS:  Review of Systems     Constitutional:  Positive for weight gain. Negative for fever, weight loss and fatigue.   HENT:  Negative for tinnitus, trouble swallowing and hoarse voice.    Eyes:  Negative for pain, eye pain and decreased vision.   Respiratory:   Negative for cough,  shortness of breath and wheezing.    Cardiovascular:  Positive for leg swelling. Negative for chest pain and palpitations.   Gastrointestinal:  Negative for heartburn, nausea, vomiting, abdominal pain, diarrhea, constipation, blood in stool and bowel incontinence.   Genitourinary:  Negative for bladder incontinence, dysuria, hematuria and difficulty urinating.   Musculoskeletal:  Positive for myalgias and arthralgias.   Skin:  Negative for itching, poor wound healing and poor wound healing.   Neurological:  Negative for dizziness, seizures, loss of consciousness, headaches and difficulty walking.   Endo/Heme:  Negative for anemia, swollen glands and bruises/bleeds easily.   Psychiatric/Behavioral:  Negative for depression.          ALLERGIES:  Allergies   Allergen Reactions     Lisinopril Cough     Hydrochlorothiazide      Renal insufficiency     Iodine Hives     Iodine contrast     Metformin      Loose stools     Niacin      LEG CRAMPS     Norvasc [Amlodipine Besylate] Swelling     Simvastatin      ACHINESS           MEDICATIONS:  Current Outpatient Medications   Medication Sig Dispense Refill     albuterol (PROAIR HFA/PROVENTIL HFA/VENTOLIN HFA) 108 (90 Base) MCG/ACT inhaler Inhale 2 puffs into the lungs every 6 hours as needed for shortness of breath / dyspnea or wheezing 18 g 11     aspirin 81 MG tablet Take 1 tablet by mouth daily.       diltiazem ER COATED BEADS (CARDIZEM CD) 360 MG 24 hr capsule TAKE 1 CAPSULE BY MOUTH EVERY DAY 90 capsule 2     fluticasone-salmeterol (ADVAIR DISKUS) 500-50 MCG/DOSE inhaler INHALE 1 PUFF INTO THE LUNGS EVERY 12 HOURS 3 Inhaler 3     gabapentin (NEURONTIN) 300 MG capsule Take 1 capsule (300 mg) by mouth 3 times daily 90 capsule 1     hydrALAZINE (APRESOLINE) 50 MG tablet TAKE ONE AND ONE-HALF TABLETS BY MOUTH THREE TIMES DAILY 405 tablet 3     labetalol (NORMODYNE) 100 MG tablet TAKE 1 TABLET BY MOUTH TWICE A  tablet 3     losartan (COZAAR) 100 MG tablet TAKE 1 TABLET  BY MOUTH EVERY DAY 90 tablet 3     methylPREDNISolone (MEDROL DOSEPAK) 4 MG tablet therapy pack Follow Package Directions 21 tablet 0     methylPREDNISolone (MEDROL DOSEPAK) 4 MG tablet therapy pack Follow Package Directions (Patient not taking: Reported on 6/8/2022) 21 tablet 0     omeprazole (PRILOSEC) 40 MG DR capsule TAKE 1 CAPSULE BY MOUTH EVERY DAY 90 capsule 1     rosuvastatin (CRESTOR) 40 MG tablet TAKE 1 TABLET BY MOUTH EVERY DAY 90 tablet 2     tiZANidine (ZANAFLEX) 2 MG tablet TAKE 1 TABLET (2 MG) BY MOUTH 3 TIMES DAILY AS NEEDED FOR MUSCLE SPASMS 90 tablet 1     vitamin D3 (CHOLECALCIFEROL) 50 mcg (2000 units) tablet Take 1 tablet (50 mcg) by mouth daily           PAST MEDICAL HISTORY:  Past Medical History:   Diagnosis Date     CAD (coronary artery disease)      mild on CT angio     CKD (chronic kidney disease) stage 3, GFR 30-59 ml/min (H)      Colon polyps 2009     DDD (degenerative disc disease), lumbosacral 7/8/2022     Esophageal reflux 4/30/2008     Family history of ischemic heart disease      Fatigue 4/11/2013     Hiatal hernia 2009     History of blood transfusion 4/08    after hiatal hernia surgery     Hyperlipidemia LDL goal <70      Macular degeneration 1/3/2012     Mild persistent asthma      Pulmonary hypertension (H) 8/20/2012    mild     Scoliosis     mild      Type 2 diabetes mellitus with diabetic chronic kidney disease  (goal A1C<8) 10/20/2015     Ulcer of the stomach and intestine 2009    Anthony's ulcer     Unspecified essential hypertension      Vitamin D deficiency 1/3/2012         PAST SURGICAL HISTORY:  Past Surgical History:   Procedure Laterality Date     ABDOMEN SURGERY  4/08    hiatal hernia     BREAST SURGERY  1974    biopsy-begign     CARDIAC SURGERY  1995    angiogram     COLONOSCOPY  2008     HERNIA REPAIR  2008     HYSTERECTOMY, IDRIS  1974    Fibroids     ZZC OPEN STOMACH,REPR ULCER,SUTURE  2009    Hiatal hernia, and Anthony's ulcer         FAMILY HISTORY:  Family  History   Problem Relation Age of Onset     Heart Disease Father         CAD, DM, PVD     C.A.D. Father         1st MI in 80s     Heart Disease Mother         Rheumatic fever     Hypertension Sister         3 sisters, all with hypertension     C.A.D. Maternal Uncle         age 54 yrs, sudden cardiac     C.A.D. Maternal Uncle         age 55 yrs     Hypertension Sister          SOCIAL HISTORY:  Social History     Socioeconomic History     Marital status:      Spouse name: Not on file     Number of children: 3     Years of education: Not on file     Highest education level: Not on file   Occupational History     Employer: RETIRED   Tobacco Use     Smoking status: Never Smoker     Smokeless tobacco: Never Used   Substance and Sexual Activity     Alcohol use: Yes     Alcohol/week: 0.0 standard drinks     Comment: 2-3 weekly     Drug use: No     Sexual activity: Yes     Partners: Male   Other Topics Concern     Parent/sibling w/ CABG, MI or angioplasty before 65F 55M? No      Service Not Asked     Blood Transfusions Not Asked     Caffeine Concern No     Comment: 1-2 cups daily     Occupational Exposure Not Asked     Hobby Hazards Not Asked     Sleep Concern No     Comment: nocturia     Stress Concern Not Asked     Weight Concern Not Asked     Special Diet Yes     Comment: low salt, limits sugar     Back Care Not Asked     Exercise No     Comment: walking, yardwork, limited due to plantar fascitis     Bike Helmet Not Asked     Seat Belt Not Asked     Self-Exams Not Asked   Social History Narrative     Not on file     Social Determinants of Health     Financial Resource Strain: Not on file   Food Insecurity: Not on file   Transportation Needs: Not on file   Physical Activity: Not on file   Stress: Not on file   Social Connections: Not on file   Intimate Partner Violence: Not on file   Housing Stability: Not on file         PHYSICAL EXAMINATION:  Vitals:    07/08/22 0954   BP: 112/66   Pulse: 74   SpO2: 97%        GENERAL:  No acute distress.  Pleasant and cooperative.   PSYCH:  Normal mood and affect.  HEAD:  Normocephalic.  SPEECH:  No dysarthria.  EYES:  No scleral icterus.  Wearing glasses.  EARS:  Hearing is intact to spoken voice.  NOSE/MOUTH:  Wearing a face mask.  LUNGS:  No respiratory distress.  No increased work of breathing.  VASCULAR/PULSES:  Dorsalis pedis:  Right 2+.  Left 2+.  LOWER EXTREMITIES: No clubbing or cyanosis bilaterally.  There is 1+ pitting edema of the ankles and feet.    BALANCE AND GAIT: Patient has a reciprocal gait pattern without antalgia.  She is able heel walk and toe walk without difficulty.  She is not asked to tandem walk as she feels she is difficult balance.  Double leg squat is performed with a quadriceps dominant pattern and mild dynamic knee valgus bilaterally.    INSPECTION:  There is no erythema or ecchymosis of the low back.  There is thoracic scoliosis convex right and lumbar scoliosis convex left.    LUMBOPELVIC PALPATION:  Lumbar Spinous Processes: not tender.  Lumbar Paraspinals:  Right mild tenderness L4-L5.  Left not tender.  Posterior Superior Iliac Spine:  Right not tender.  Left not tender.  Iliac Crest:  Right not tender.  Left not tender.  Sacroiliac Joint:  Right not tender.  Left not tender.  Hip External Rotators:  Right not tender.  Left not tender.  Ischial Tuberosity:  Right not tender.  Left not tender.  Greater Trochanter:  Right not tender.  Left not tender.  Coccyx:  not tender.    THORACOLUMBAR RANGE OF MOTION:  Forward flexion (85 ): Mildly reduced range of motion, does not cause pain, does not cause radiating pain.  Extension (30 ): Moderately reduced range of motion, increases low back pain, does not cause radiating pain.  Lateral bending right (30 ):  Moderately reduced range of motion, increases low back pain, does not cause radiating pain.  Lateral bending left (30 ): Moderately reduced range of motion, increases low back pain, increases pain  radiating to left lower extremity.  Twisting right with extension:  Moderately reduced range of motion, increases low back pain, does not cause radiating pain.  Twisting left with extension:  Moderately reduced range of motion, increases low back pain, does not cause radiating pain.  Sacroiliac joint dysfunction:  Right -.  Left -.    HIP RANGE OF MOTION:  Flexion (110 ):  Right 110 .  Left 110 .  Extension (30 ):  Right 25 .  Left 25 .  External rotation (45 ):  Right 45 .  Left 45 .  Internal rotation (35 ):  Right 35 .  Left 35 .    KNEE RANGE OF MOTION:  Extension (0 ):  Right 0 .  Left 0 .  Flexion (135 ):  Right 145 .  Left 145 .    STRENGTH:  Hip flexion:  Right 5/5.  Left 5/5.  Hip abduction:  Right 4/5.  Left 4+/5.  Hip external rotation:  Right 4/5.  Left 4+/5.  Knee extension:  Right 5/5.  Left 5/5.  Knee flexion:  Right 4+/5.  Left 5/5.  Ankle dorsiflexion:  Right 4+/5.  Left 5/5.  Great toe dorsiflexion:  Right 4/5.  Left 5/5.  Ankle plantar flexion:  Right 5/5.  Left 5/5.    SENSATION:  Intact to light touch along right L2, L3, L4, L5, S1, S2.  Intact to light touch along left L2, L3, L4, L5, S1, S2.    REFLEXES:  Patellar (L2-L4):  Right 2+.  Left 3+.  Medial hamstrings (L5-S1):  Right 2+.  Left 3+.  Achilles (S1-S2):  Right 2+.  Left 2+.  Babinski:  Right downgoing.  Left downgoing.  Forced ankle dorsiflexion (clonus):  Right 0 beats.  Left 0 beats.    LUMBOPELVIC SPECIAL TESTS:  Log roll:  Right -.  Left -.  Straight leg raise:  Right -.  Left -.  Crossover straight leg raise:  Right -.  Left -.  Resisted straight leg raise:  Right -.  Left -.  TEODORA:  Right -.  Left -.  FADIR:  Right -.  Left -.  Hip scour:  Right -.  Left -.  Lateral sacral compression:  Right -.  Left -.  Clamshell:  Right -.  Left -.  Ely s:  Right +.  Left +.  Yeoman s:  Right -.  Left -.  Distraction:  Right -.  Left -.  Sacral thrust:  Right -.  Left -.  Noble compression:  Right -.  Left -.    As the right lower extremity is  moved from a frog-leg position (hip abducted, flexed, and externally rotated) to neutral, there is no pop or pain.  As the left lower extremity is moved from a frog-leg position (hip abducted, flexed, and externally rotated) to neutral, there is no pop or pain.        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  Careful attention to the numbering convention is recommended prior to any future percutaneous or surgical intervention. Nomenclature is based on twelve thoracic vertebral bodies and 12 paired ribs.  The first nonrib-bearing vertebral body will be labeled L1.     Sagittal balance is 2.9 cm positive.     Lumbar lordosis measures 8.8 degrees. . This was measured from the superior endplate of L1 to the inferior endplate of L5.     Thoracic kyphosis measures 50.9 degrees. . This was measured from the superior endplate of T1 to the inferior endplate of T12.     Dextroconvex scoliosis of the thoracic spine from the superior endplate of T8 to the inferior plate of T12 measuring 15.8 degrees. Levoconvex scoliosis of the lumbar spine from the superior plate of L1 to the inferior plate of L5 measuring 33.4 degrees.      Coronal balance is 1.1 cm to the left of the CSVL.     No focal consolidation or pleural effusion. Nonobstructive bowel gas pattern. Soft tissues unremarkable.      XR LUMBAR BENDING ONLY 2/3 VIEWS 6/8/2022 2:10 PM     INDICATION: Spinal stenosis of lumbar region with neurogenic claudication  COMPARISON: Scoliosis radiographs performed same day. MRI lumbar spine February 15, 2022.    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  This is different from the literature prior lumbar spine MRI February 15, 2022 Careful attention to the numbering convention is  recommended prior to any future percutaneous or surgical intervention.     On flexion there is anterolisthesis of L5 on S1 measuring 5 mm with normal alignment on extension. The vertebral bodies of the lumbar spine otherwise have normal stature and alignment. Anterior marginal osteophytes L1/L2, L2/L3 and L3/L4. Multilevel degenerative facet arthropathy, most pronounced at L3/L4-L5/S1. Anterior marginal osteophytes at L1/L2 and L2/L3. Atherosclerotic vascular disease. Soft tissues otherwise unremarkable.      MRI OF THE LUMBAR SPINE WITHOUT CONTRAST February 15, 2022 3:21 PM      HISTORY: Low back pain with worsening right lower extremity radicular  pain.     TECHNIQUE: Multiplanar, multisequence MRI images of the lumbar spine were acquired without IV contrast.     COMPARISON: None.     FINDINGS: There are five lumbar-type vertebrae for the purposes of this dictation.      Five lumbar type vertebral bodies. Normal vertebral body heights. 25 degree levoscoliosis apex at L2-L3. Mild Modic 1 marrow change in the L3-L4 apposing endplate on the right. The conus tip is identified at L1-L2. Visualized extraspinal soft tissues are within normal limits.     T12-L1: Moderate to severe disc height and T2 signal loss. Mild circumferential disc osteophyte complex. Mild bilateral facet arthropathy. No spinal canal or neural foraminal stenosis.      L1-L2: Moderate to severe disc height and T2 signal loss. Mild posterior annular bulge. Mild bilateral facet arthropathy. No spinal canal or neural foraminal stenosis.     L2-L3: Moderate to severe disc height and T2 signal loss. Mild circumferential disc osteophyte complex. Mild bilateral facet arthropathy and thickening of ligamenta flava. No spinal canal stenosis. No neural foraminal stenosis.     L3-L4: 9 mm leftward subluxation. Moderate disc height and T2 signal loss. Mild-to-moderate circumferential disc osteophyte complex. Mild bilateral facet arthropathy. Moderate to severe  spinal canal stenosis. Severe right and mild to moderate left neural foraminal stenosis.      L4-L5: 2 mm degenerative anterolisthesis. Mild disc height loss. Moderate disc T2 signal loss. Mild posterior annular bulge and left foraminal disc osteophyte complex. Moderate bilateral facet arthropathy and thickening of ligamenta flava. Mild/moderate spinal canal stenosis in a trefoil configuration. No right neural foraminal stenosis. Mild left neural foraminal stenosis.     L5-S1: Normal disc height. Moderate disc T2 signal loss. Mild posterior annular bulge. Small caudally directed left central disc herniation. Mild bilateral facet arthropathy. Mild left lateral recess stenosis. No central spinal canal stenosis. No right neural foraminal stenosis. Mild left neural foraminal stenosis.    IMPRESSION:  1.  25 degree levoscoliosis apex at L2-L3.  2.  9 mm L3-L4 leftward subluxation with moderate to severe spinal canal stenosis, severe right neural foraminal stenosis and mild to moderate left neural from stenosis.  3.  Mild/moderate L4-L5 spinal canal stenosis in a trefoil configuration.  4.  Mild left L5-S1 lateral recess stenosis.        ASSESSMENT/PLAN:  Ms. Cristela Salgado is an 81-year-old, right-hand-dominant, female.  She has right lumbosacral radiculopathy (L4, possibly L5), symptomatic lumbosacral spinal stenosis, anterolisthesis of L5 on S1 with dynamic instability, lumbosacral facet arthropathy, thoracolumbar scoliosis, lumbosacral degenerative disc disease.  Discussed diagnoses, pathophysiology, and treatment options with Ms. Cristela Salgado and her .  Discussed the options of doing nothing/living with it, physical therapy, chiropractic care, oral medications such as gabapentin and pregabalin, lumbosacral epidural corticosteroid injection, follow-up with neurosurgery.  Discussed the risks and benefits of a lumbosacral epidural corticosteroid injection with Ms. Cristela Salgado and her .   Discussed the risks of immune suppression with corticosteroid use in light of the COVID-19 pandemic.  Ms. Cristela Salgado will be set up for right paramedian L4-L5 interlaminar epidural corticosteroid injection.  Ms. Cristela Salgado has a fully lumbarized S1 vertebrae.  Ms. Cristela Salgado is to follow-up in this clinic after the injection.        Samir Mathis MD

## 2022-07-08 NOTE — PROGRESS NOTES
"PHYSICAL MEDICINE & REHABILITATION / MEDICAL SPINE        Date:  Jul 8, 2022    Name:  Cristela Salgado  YOB: 1940  MRN:  [id number]          REASON FOR CONSULTATION:  Lumbar spinal stenosis with neurogenic claudication.        REFERRING PROVIDER:  Dipesh Méndez MD        CHART REVIEW:  Reviewed 06/08/2022 note from Dipesh Méndez MD (neurosurgery).  Ms. Cristela Salgado had back and leg pain beginning in February 2022.  Pain began when she was walking up stairs, and Ms. Cristela Salgado's right leg went numb, and she was unable to walk anymore.  Ms. Cristela Salgado would occasionally get pain into the left leg.  Pain was worse with turning over in bed and getting up initially.  Pain was worse with transitioning from sitting to standing.  Ms. Cristela Salgado was started on gabapentin and tizanidine.  She was referred to physical therapy.  She was referred to medical spine.  If she were to fail conservative management, surgery would require a multilevel lumbar fusion procedure.        HISTORY OF PRESENT ILLNESS:  Ms. Cristela Salgado is an 81-year-old, right-hand-dominant, female.  She is accompanied to today's appointment by her , Mr. Krzysztof Salgado.    Ms. Cristela Salgado began having right low back pain on 12/24/2021.  Right low back pain is intermittent and described as \"sharp.\"  Right low back pain is worsened by standing and walking.  There is intermittent pain radiating into the bilateral lower extremities, right worse than left.  Pain radiates into the right buttock, right lateral hip, right anterior thigh, right anterior leg, stopping at the ankle.  Pain radiates in a similar pattern on the left side, but this is much less significant.  Ms. Cristela Salgado notes numbness and tingling in the same distribution as her pain.  The numbness and tingling is more pronounced in the left lower extremity than in the right lower extremity.  Ms. Cristela Salgado seems to notice the numbness " and tingling the most when she is trying to get out of bed.    Ms. Cristela Salgado currently rates her pain as 4/10.  She is taking gabapentin 300 mg p.o. 3 times daily.  She notes that her legs and feet swell on this medication.  Ms. Cristela Salgado noted benefit from a Medrol Dosepak.  Ms. Cristela Salgado is taking acetaminophen 500 mg approximately 3 times per week.  Ms. Cristela Salgado did not have any benefit with physical therapy.    Ms. Cristela Salgado denies any weakness.  She has had 3 giveaway episodes of her lower extremities leading to falls.  Ms. Cristela Salgado denies any tripping or stumbling.  She denies any other numbness, tingling, pins-and-needles.  Ms. Cristela Salgado denies any saddle anesthesia, bowel incontinence, bladder incontinence.  Ms. Cristela Salgado's bilateral lower extremities become weak and tired after walking half a block.  She notes improvement in her symptoms with leaning forward on a shopping cart.    Ms. Cristela Salgado denies any prior or recent injuries of her low back, pelvis, hips, thighs, knees, legs, ankles, feet, toes.    Ms. Cristela Salgado denies any personal or family history of autoimmune diseases, rheumatologic diseases, gout, pseudogout.  She denies any personal or family history of neurologic diseases.  Ms. Cristela Salgado denies any personal or family history of inflammatory bowel diseases.        REVIEW OF SYSTEMS:  Review of Systems     Constitutional:  Positive for weight gain. Negative for fever, weight loss and fatigue.   HENT:  Negative for tinnitus, trouble swallowing and hoarse voice.    Eyes:  Negative for pain, eye pain and decreased vision.   Respiratory:   Negative for cough, shortness of breath and wheezing.    Cardiovascular:  Positive for leg swelling. Negative for chest pain and palpitations.   Gastrointestinal:  Negative for heartburn, nausea, vomiting, abdominal pain, diarrhea, constipation, blood in stool and bowel incontinence.    Genitourinary:  Negative for bladder incontinence, dysuria, hematuria and difficulty urinating.   Musculoskeletal:  Positive for myalgias and arthralgias.   Skin:  Negative for itching, poor wound healing and poor wound healing.   Neurological:  Negative for dizziness, seizures, loss of consciousness, headaches and difficulty walking.   Endo/Heme:  Negative for anemia, swollen glands and bruises/bleeds easily.   Psychiatric/Behavioral:  Negative for depression.          ALLERGIES:  Allergies   Allergen Reactions     Lisinopril Cough     Hydrochlorothiazide      Renal insufficiency     Iodine Hives     Iodine contrast     Metformin      Loose stools     Niacin      LEG CRAMPS     Norvasc [Amlodipine Besylate] Swelling     Simvastatin      ACHINESS           MEDICATIONS:  Current Outpatient Medications   Medication Sig Dispense Refill     albuterol (PROAIR HFA/PROVENTIL HFA/VENTOLIN HFA) 108 (90 Base) MCG/ACT inhaler Inhale 2 puffs into the lungs every 6 hours as needed for shortness of breath / dyspnea or wheezing 18 g 11     aspirin 81 MG tablet Take 1 tablet by mouth daily.       diltiazem ER COATED BEADS (CARDIZEM CD) 360 MG 24 hr capsule TAKE 1 CAPSULE BY MOUTH EVERY DAY 90 capsule 2     fluticasone-salmeterol (ADVAIR DISKUS) 500-50 MCG/DOSE inhaler INHALE 1 PUFF INTO THE LUNGS EVERY 12 HOURS 3 Inhaler 3     gabapentin (NEURONTIN) 300 MG capsule Take 1 capsule (300 mg) by mouth 3 times daily 90 capsule 1     hydrALAZINE (APRESOLINE) 50 MG tablet TAKE ONE AND ONE-HALF TABLETS BY MOUTH THREE TIMES DAILY 405 tablet 3     labetalol (NORMODYNE) 100 MG tablet TAKE 1 TABLET BY MOUTH TWICE A  tablet 3     losartan (COZAAR) 100 MG tablet TAKE 1 TABLET BY MOUTH EVERY DAY 90 tablet 3     methylPREDNISolone (MEDROL DOSEPAK) 4 MG tablet therapy pack Follow Package Directions 21 tablet 0     methylPREDNISolone (MEDROL DOSEPAK) 4 MG tablet therapy pack Follow Package Directions (Patient not taking: Reported on  6/8/2022) 21 tablet 0     omeprazole (PRILOSEC) 40 MG DR capsule TAKE 1 CAPSULE BY MOUTH EVERY DAY 90 capsule 1     rosuvastatin (CRESTOR) 40 MG tablet TAKE 1 TABLET BY MOUTH EVERY DAY 90 tablet 2     tiZANidine (ZANAFLEX) 2 MG tablet TAKE 1 TABLET (2 MG) BY MOUTH 3 TIMES DAILY AS NEEDED FOR MUSCLE SPASMS 90 tablet 1     vitamin D3 (CHOLECALCIFEROL) 50 mcg (2000 units) tablet Take 1 tablet (50 mcg) by mouth daily           PAST MEDICAL HISTORY:  Past Medical History:   Diagnosis Date     CAD (coronary artery disease)      mild on CT angio     CKD (chronic kidney disease) stage 3, GFR 30-59 ml/min (H)      Colon polyps 2009     DDD (degenerative disc disease), lumbosacral 7/8/2022     Esophageal reflux 4/30/2008     Family history of ischemic heart disease      Fatigue 4/11/2013     Hiatal hernia 2009     History of blood transfusion 4/08    after hiatal hernia surgery     Hyperlipidemia LDL goal <70      Macular degeneration 1/3/2012     Mild persistent asthma      Pulmonary hypertension (H) 8/20/2012    mild     Scoliosis     mild      Type 2 diabetes mellitus with diabetic chronic kidney disease  (goal A1C<8) 10/20/2015     Ulcer of the stomach and intestine 2009    Anthony's ulcer     Unspecified essential hypertension      Vitamin D deficiency 1/3/2012         PAST SURGICAL HISTORY:  Past Surgical History:   Procedure Laterality Date     ABDOMEN SURGERY  4/08    hiatal hernia     BREAST SURGERY  1974    biopsy-begign     CARDIAC SURGERY  1995    angiogram     COLONOSCOPY  2008     HERNIA REPAIR  2008     HYSTERECTOMY, IDRIS  1974    Fibroids     ZZC OPEN STOMACH,REPR ULCER,SUTURE  2009    Hiatal hernia, and Anthony's ulcer         FAMILY HISTORY:  Family History   Problem Relation Age of Onset     Heart Disease Father         CAD, DM, PVD     C.A.D. Father         1st MI in 80s     Heart Disease Mother         Rheumatic fever     Hypertension Sister         3 sisters, all with hypertension     C.A.D. Maternal  Uncle         age 54 yrs, sudden cardiac     C.A.D. Maternal Uncle         age 55 yrs     Hypertension Sister          SOCIAL HISTORY:  Social History     Socioeconomic History     Marital status:      Spouse name: Not on file     Number of children: 3     Years of education: Not on file     Highest education level: Not on file   Occupational History     Employer: RETIRED   Tobacco Use     Smoking status: Never Smoker     Smokeless tobacco: Never Used   Substance and Sexual Activity     Alcohol use: Yes     Alcohol/week: 0.0 standard drinks     Comment: 2-3 weekly     Drug use: No     Sexual activity: Yes     Partners: Male   Other Topics Concern     Parent/sibling w/ CABG, MI or angioplasty before 65F 55M? No      Service Not Asked     Blood Transfusions Not Asked     Caffeine Concern No     Comment: 1-2 cups daily     Occupational Exposure Not Asked     Hobby Hazards Not Asked     Sleep Concern No     Comment: nocturia     Stress Concern Not Asked     Weight Concern Not Asked     Special Diet Yes     Comment: low salt, limits sugar     Back Care Not Asked     Exercise No     Comment: walking, yardwork, limited due to plantar fascitis     Bike Helmet Not Asked     Seat Belt Not Asked     Self-Exams Not Asked   Social History Narrative     Not on file     Social Determinants of Health     Financial Resource Strain: Not on file   Food Insecurity: Not on file   Transportation Needs: Not on file   Physical Activity: Not on file   Stress: Not on file   Social Connections: Not on file   Intimate Partner Violence: Not on file   Housing Stability: Not on file         PHYSICAL EXAMINATION:  Vitals:    07/08/22 0954   BP: 112/66   Pulse: 74   SpO2: 97%       GENERAL:  No acute distress.  Pleasant and cooperative.   PSYCH:  Normal mood and affect.  HEAD:  Normocephalic.  SPEECH:  No dysarthria.  EYES:  No scleral icterus.  Wearing glasses.  EARS:  Hearing is intact to spoken voice.  NOSE/MOUTH:  Wearing a face  mask.  LUNGS:  No respiratory distress.  No increased work of breathing.  VASCULAR/PULSES:  Dorsalis pedis:  Right 2+.  Left 2+.  LOWER EXTREMITIES: No clubbing or cyanosis bilaterally.  There is 1+ pitting edema of the ankles and feet.    BALANCE AND GAIT: Patient has a reciprocal gait pattern without antalgia.  She is able heel walk and toe walk without difficulty.  She is not asked to tandem walk as she feels she is difficult balance.  Double leg squat is performed with a quadriceps dominant pattern and mild dynamic knee valgus bilaterally.    INSPECTION:  There is no erythema or ecchymosis of the low back.  There is thoracic scoliosis convex right and lumbar scoliosis convex left.    LUMBOPELVIC PALPATION:  Lumbar Spinous Processes: not tender.  Lumbar Paraspinals:  Right mild tenderness L4-L5.  Left not tender.  Posterior Superior Iliac Spine:  Right not tender.  Left not tender.  Iliac Crest:  Right not tender.  Left not tender.  Sacroiliac Joint:  Right not tender.  Left not tender.  Hip External Rotators:  Right not tender.  Left not tender.  Ischial Tuberosity:  Right not tender.  Left not tender.  Greater Trochanter:  Right not tender.  Left not tender.  Coccyx:  not tender.    THORACOLUMBAR RANGE OF MOTION:  Forward flexion (85 ): Mildly reduced range of motion, does not cause pain, does not cause radiating pain.  Extension (30 ): Moderately reduced range of motion, increases low back pain, does not cause radiating pain.  Lateral bending right (30 ):  Moderately reduced range of motion, increases low back pain, does not cause radiating pain.  Lateral bending left (30 ): Moderately reduced range of motion, increases low back pain, increases pain radiating to left lower extremity.  Twisting right with extension:  Moderately reduced range of motion, increases low back pain, does not cause radiating pain.  Twisting left with extension:  Moderately reduced range of motion, increases low back pain, does not  cause radiating pain.  Sacroiliac joint dysfunction:  Right -.  Left -.    HIP RANGE OF MOTION:  Flexion (110 ):  Right 110 .  Left 110 .  Extension (30 ):  Right 25 .  Left 25 .  External rotation (45 ):  Right 45 .  Left 45 .  Internal rotation (35 ):  Right 35 .  Left 35 .    KNEE RANGE OF MOTION:  Extension (0 ):  Right 0 .  Left 0 .  Flexion (135 ):  Right 145 .  Left 145 .    STRENGTH:  Hip flexion:  Right 5/5.  Left 5/5.  Hip abduction:  Right 4/5.  Left 4+/5.  Hip external rotation:  Right 4/5.  Left 4+/5.  Knee extension:  Right 5/5.  Left 5/5.  Knee flexion:  Right 4+/5.  Left 5/5.  Ankle dorsiflexion:  Right 4+/5.  Left 5/5.  Great toe dorsiflexion:  Right 4/5.  Left 5/5.  Ankle plantar flexion:  Right 5/5.  Left 5/5.    SENSATION:  Intact to light touch along right L2, L3, L4, L5, S1, S2.  Intact to light touch along left L2, L3, L4, L5, S1, S2.    REFLEXES:  Patellar (L2-L4):  Right 2+.  Left 3+.  Medial hamstrings (L5-S1):  Right 2+.  Left 3+.  Achilles (S1-S2):  Right 2+.  Left 2+.  Babinski:  Right downgoing.  Left downgoing.  Forced ankle dorsiflexion (clonus):  Right 0 beats.  Left 0 beats.    LUMBOPELVIC SPECIAL TESTS:  Log roll:  Right -.  Left -.  Straight leg raise:  Right -.  Left -.  Crossover straight leg raise:  Right -.  Left -.  Resisted straight leg raise:  Right -.  Left -.  TEODORA:  Right -.  Left -.  FADIR:  Right -.  Left -.  Hip scour:  Right -.  Left -.  Lateral sacral compression:  Right -.  Left -.  Clamshell:  Right -.  Left -.  Ely s:  Right +.  Left +.  Yeoman s:  Right -.  Left -.  Distraction:  Right -.  Left -.  Sacral thrust:  Right -.  Left -.  Noble compression:  Right -.  Left -.    As the right lower extremity is moved from a frog-leg position (hip abducted, flexed, and externally rotated) to neutral, there is no pop or pain.  As the left lower extremity is moved from a frog-leg position (hip abducted, flexed, and externally rotated) to neutral, there is no pop or  pain.        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  Careful attention to the numbering convention is recommended prior to any future percutaneous or surgical intervention. Nomenclature is based on twelve thoracic vertebral bodies and 12 paired ribs.  The first nonrib-bearing vertebral body will be labeled L1.     Sagittal balance is 2.9 cm positive.     Lumbar lordosis measures 8.8 degrees. . This was measured from the superior endplate of L1 to the inferior endplate of L5.     Thoracic kyphosis measures 50.9 degrees. . This was measured from the superior endplate of T1 to the inferior endplate of T12.     Dextroconvex scoliosis of the thoracic spine from the superior endplate of T8 to the inferior plate of T12 measuring 15.8 degrees. Levoconvex scoliosis of the lumbar spine from the superior plate of L1 to the inferior plate of L5 measuring 33.4 degrees.      Coronal balance is 1.1 cm to the left of the CSVL.     No focal consolidation or pleural effusion. Nonobstructive bowel gas pattern. Soft tissues unremarkable.      XR LUMBAR BENDING ONLY 2/3 VIEWS 6/8/2022 2:10 PM     INDICATION: Spinal stenosis of lumbar region with neurogenic claudication  COMPARISON: Scoliosis radiographs performed same day. MRI lumbar spine February 15, 2022.    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  This is different from the literature prior lumbar spine MRI February 15, 2022 Careful attention to the numbering convention is recommended prior to any future percutaneous or surgical intervention.     On flexion there is anterolisthesis of L5 on S1 measuring 5 mm with normal alignment on extension. The vertebral bodies of the lumbar spine otherwise have normal stature and  alignment. Anterior marginal osteophytes L1/L2, L2/L3 and L3/L4. Multilevel degenerative facet arthropathy, most pronounced at L3/L4-L5/S1. Anterior marginal osteophytes at L1/L2 and L2/L3. Atherosclerotic vascular disease. Soft tissues otherwise unremarkable.      MRI OF THE LUMBAR SPINE WITHOUT CONTRAST February 15, 2022 3:21 PM      HISTORY: Low back pain with worsening right lower extremity radicular  pain.     TECHNIQUE: Multiplanar, multisequence MRI images of the lumbar spine were acquired without IV contrast.     COMPARISON: None.     FINDINGS: There are five lumbar-type vertebrae for the purposes of this dictation.      Five lumbar type vertebral bodies. Normal vertebral body heights. 25 degree levoscoliosis apex at L2-L3. Mild Modic 1 marrow change in the L3-L4 apposing endplate on the right. The conus tip is identified at L1-L2. Visualized extraspinal soft tissues are within normal limits.     T12-L1: Moderate to severe disc height and T2 signal loss. Mild circumferential disc osteophyte complex. Mild bilateral facet arthropathy. No spinal canal or neural foraminal stenosis.      L1-L2: Moderate to severe disc height and T2 signal loss. Mild posterior annular bulge. Mild bilateral facet arthropathy. No spinal canal or neural foraminal stenosis.     L2-L3: Moderate to severe disc height and T2 signal loss. Mild circumferential disc osteophyte complex. Mild bilateral facet arthropathy and thickening of ligamenta flava. No spinal canal stenosis. No neural foraminal stenosis.     L3-L4: 9 mm leftward subluxation. Moderate disc height and T2 signal loss. Mild-to-moderate circumferential disc osteophyte complex. Mild bilateral facet arthropathy. Moderate to severe spinal canal stenosis. Severe right and mild to moderate left neural foraminal stenosis.      L4-L5: 2 mm degenerative anterolisthesis. Mild disc height loss. Moderate disc T2 signal loss. Mild posterior annular bulge and left foraminal disc  osteophyte complex. Moderate bilateral facet arthropathy and thickening of ligamenta flava. Mild/moderate spinal canal stenosis in a trefoil configuration. No right neural foraminal stenosis. Mild left neural foraminal stenosis.     L5-S1: Normal disc height. Moderate disc T2 signal loss. Mild posterior annular bulge. Small caudally directed left central disc herniation. Mild bilateral facet arthropathy. Mild left lateral recess stenosis. No central spinal canal stenosis. No right neural foraminal stenosis. Mild left neural foraminal stenosis.    IMPRESSION:  1.  25 degree levoscoliosis apex at L2-L3.  2.  9 mm L3-L4 leftward subluxation with moderate to severe spinal canal stenosis, severe right neural foraminal stenosis and mild to moderate left neural from stenosis.  3.  Mild/moderate L4-L5 spinal canal stenosis in a trefoil configuration.  4.  Mild left L5-S1 lateral recess stenosis.        ASSESSMENT/PLAN:  Ms. Cristela Salgado is an 81-year-old, right-hand-dominant, female.  She has right lumbosacral radiculopathy (L4, possibly L5), symptomatic lumbosacral spinal stenosis, anterolisthesis of L5 on S1 with dynamic instability, lumbosacral facet arthropathy, thoracolumbar scoliosis, lumbosacral degenerative disc disease.  Discussed diagnoses, pathophysiology, and treatment options with Ms. Cristela Salgado and her .  Discussed the options of doing nothing/living with it, physical therapy, chiropractic care, oral medications such as gabapentin and pregabalin, lumbosacral epidural corticosteroid injection, follow-up with neurosurgery.  Discussed the risks and benefits of a lumbosacral epidural corticosteroid injection with Ms. Cristela Salgado and her .  Discussed the risks of immune suppression with corticosteroid use in light of the COVID-19 pandemic.  Ms. Cristela Salgado will be set up for right paramedian L4-L5 interlaminar epidural corticosteroid injection.  Ms. Cristela Salgado has a fully  lumbarized S1 vertebrae.  MsDaysi Cristela AZEVEDO Reannalamini is to follow-up in this clinic after the injection.        Samir Mathis MD

## 2022-07-08 NOTE — PATIENT INSTRUCTIONS
Please schedule fluoroscopic-guided lumbosacral interlaminar epidural steroid injection with me.  The [facility] Procedure Scheduling Team will call you to schedule your procedure in the next 0-3 days.  You can also call them directly at [phone].    Please schedule a follow-up appointment 2 weeks after your injection.  You can schedule this at the , or you can call [phone].

## 2022-07-14 ENCOUNTER — TELEPHONE (OUTPATIENT)
Dept: INTERVENTIONAL RADIOLOGY/VASCULAR | Facility: CLINIC | Age: 82
End: 2022-07-14

## 2022-07-14 ENCOUNTER — TELEPHONE (OUTPATIENT)
Dept: GENERAL RADIOLOGY | Facility: CLINIC | Age: 82
End: 2022-07-14

## 2022-07-14 ENCOUNTER — ANCILLARY PROCEDURE (OUTPATIENT)
Dept: MAMMOGRAPHY | Facility: CLINIC | Age: 82
End: 2022-07-14
Attending: INTERNAL MEDICINE
Payer: COMMERCIAL

## 2022-07-14 DIAGNOSIS — Z12.31 VISIT FOR SCREENING MAMMOGRAM: ICD-10-CM

## 2022-07-14 PROCEDURE — 77067 SCR MAMMO BI INCL CAD: CPT | Mod: TC | Performed by: RADIOLOGY

## 2022-07-14 NOTE — TELEPHONE ENCOUNTER
Spoke with patient about appointment with Dr Mathis on 7/18/22.  Patient states that she is  on blood thinners, takes aspirin and will stop 5 days. She is not on antibiotics.  Patient does not have any current illness and has not have a vaccine or immunization within the past two weeks.  Patient knows to have a  and arrive 15 minutes before injection start time. Patient does have an allergy to iodine, therefore methylprednisolone was called in to [facility] and pt was advised how to take Rx.

## 2022-07-18 ENCOUNTER — HOSPITAL ENCOUNTER (OUTPATIENT)
Dept: GENERAL RADIOLOGY | Facility: CLINIC | Age: 82
Discharge: HOME | End: 2022-07-18
Attending: PHYSICAL MEDICINE & REHABILITATION
Payer: COMMERCIAL

## 2022-07-18 DIAGNOSIS — M51.379 DDD (DEGENERATIVE DISC DISEASE), LUMBOSACRAL: ICD-10-CM

## 2022-07-18 DIAGNOSIS — M48.07 LUMBOSACRAL SPINAL STENOSIS: ICD-10-CM

## 2022-07-18 DIAGNOSIS — M47.817 FACET ARTHROPATHY, LUMBOSACRAL: ICD-10-CM

## 2022-07-18 DIAGNOSIS — M54.17 LUMBOSACRAL RADICULOPATHY: ICD-10-CM

## 2022-07-18 DIAGNOSIS — M41.9 SCOLIOSIS OF THORACOLUMBAR SPINE, UNSPECIFIED SCOLIOSIS TYPE: ICD-10-CM

## 2022-07-18 PROCEDURE — 250N000011 HC RX IP 250 OP 636: Performed by: PHYSICAL MEDICINE & REHABILITATION

## 2022-07-18 PROCEDURE — 255N000002 HC RX 255 OP 636: Performed by: PHYSICAL MEDICINE & REHABILITATION

## 2022-07-18 PROCEDURE — 62323 NJX INTERLAMINAR LMBR/SAC: CPT | Mod: 76

## 2022-07-18 PROCEDURE — 250N000009 HC RX 250

## 2022-07-18 PROCEDURE — 62323 NJX INTERLAMINAR LMBR/SAC: CPT | Performed by: PHYSICAL MEDICINE & REHABILITATION

## 2022-07-18 RX ORDER — LIDOCAINE HYDROCHLORIDE 10 MG/ML
INJECTION, SOLUTION EPIDURAL; INFILTRATION; INTRACAUDAL; PERINEURAL
Status: DISCONTINUED
Start: 2022-07-18 | End: 2022-07-19 | Stop reason: HOSPADM

## 2022-07-18 RX ADMIN — IOPAMIDOL 1 ML: 408 INJECTION, SOLUTION INTRATHECAL at 14:26

## 2022-07-18 RX ADMIN — DEXAMETHASONE SODIUM PHOSPHATE 10 MG: 10 INJECTION, SOLUTION INTRAMUSCULAR; INTRAVENOUS at 14:27

## 2022-07-18 RX ADMIN — LIDOCAINE HYDROCHLORIDE 8 ML: 10 INJECTION, SOLUTION EPIDURAL; INFILTRATION; INTRACAUDAL; PERINEURAL at 14:26

## 2022-07-18 NOTE — PROGRESS NOTES
Patient tolerated right S1-S2 right paramedian interlaminar epidural steroid injection well by Dr Mathis .L4-L5 was attempted and unable to be accessed.  Site Clean Dry and intact, dressing applied with no drainage.  Patient rated pre procedural pain at 3/10 and post pain at 0/10.  Patient home per , understands written and oral instructions.    Catie Villegas, RN, BSN

## 2022-07-19 ENCOUNTER — TELEPHONE (OUTPATIENT)
Dept: ORTHOPEDICS | Facility: CLINIC | Age: 82
End: 2022-07-19

## 2022-08-08 DIAGNOSIS — I10 ESSENTIAL HYPERTENSION: ICD-10-CM

## 2022-08-09 RX ORDER — LABETALOL 100 MG/1
TABLET, FILM COATED ORAL
Qty: 180 TABLET | Refills: 2 | Status: SHIPPED | OUTPATIENT
Start: 2022-08-09 | End: 2023-05-12

## 2022-08-11 DIAGNOSIS — I10 ESSENTIAL HYPERTENSION: ICD-10-CM

## 2022-08-12 ENCOUNTER — OFFICE VISIT (OUTPATIENT)
Dept: ORTHOPEDICS | Facility: CLINIC | Age: 82
End: 2022-08-12
Payer: COMMERCIAL

## 2022-08-12 VITALS
SYSTOLIC BLOOD PRESSURE: 128 MMHG | HEART RATE: 75 BPM | OXYGEN SATURATION: 97 % | DIASTOLIC BLOOD PRESSURE: 72 MMHG | BODY MASS INDEX: 23.74 KG/M2 | WEIGHT: 134 LBS | HEIGHT: 63 IN

## 2022-08-12 DIAGNOSIS — M51.379 DDD (DEGENERATIVE DISC DISEASE), LUMBOSACRAL: ICD-10-CM

## 2022-08-12 DIAGNOSIS — M47.817 FACET ARTHROPATHY, LUMBOSACRAL: Primary | ICD-10-CM

## 2022-08-12 DIAGNOSIS — M54.17 LUMBOSACRAL RADICULOPATHY: ICD-10-CM

## 2022-08-12 DIAGNOSIS — M41.9 SCOLIOSIS OF THORACOLUMBAR SPINE, UNSPECIFIED SCOLIOSIS TYPE: ICD-10-CM

## 2022-08-12 DIAGNOSIS — M48.07 LUMBOSACRAL SPINAL STENOSIS: ICD-10-CM

## 2022-08-12 PROCEDURE — 99215 OFFICE O/P EST HI 40 MIN: CPT | Performed by: PHYSICAL MEDICINE & REHABILITATION

## 2022-08-12 ASSESSMENT — PAIN SCALES - GENERAL: PAINLEVEL: MILD PAIN (2)

## 2022-08-12 NOTE — PROGRESS NOTES
PHYSICAL MEDICINE & REHABILITATION / MEDICAL SPINE        Date:  Aug 12, 2022    Name:  Cristela Salgado  YOB: 1940  MRN:  [id number]          CHART REVIEW:  Reviewed 06/08/2022 note from Dipesh Méndez MD (neurosurgery).  Ms. Cristela Salgado had back and leg pain beginning in February 2022.  Pain began when she was walking up stairs, and Ms. Cristela Salgado's right leg went numb, and she was unable to walk anymore.  Ms. Cristela Salgado would occasionally get pain into the left leg.  Pain was worse with turning over in bed and getting up initially.  Pain was worse with transitioning from sitting to standing.  Ms. Cristela Salgado was started on gabapentin and tizanidine.  She was referred to physical therapy.  She was referred to medical spine.  If she were to fail conservative management, surgery would require a multilevel lumbar fusion procedure.        CHIEF COMPLAINT:  Low back pain.        HISTORY OF PRESENT ILLNESS:  Ms. Cristela Salgado is an 81-year-old, right-hand-dominant, female.    Ms. Cristela Salgado denied any prior or recent injuries of her low back, pelvis, hips, thighs, knees, legs, ankles, feet, toes.     Ms. Cristela Salgado denied any personal or family history of autoimmune diseases, rheumatologic diseases, gout, pseudogout.  She denied any personal or family history of neurologic diseases.  Ms. Cristela Salgado denied any personal or family history of inflammatory bowel diseases.     Ms. Cristela Salgado was last seen in this clinic on 07/08/2022.  On 07/18/2022, Ms. Cristela Salgado had a fluoroscopic guided right paramedian S1-S2 interlaminar epidural corticosteroid injection.  Ms. Cristela Salgado returns to clinic today, 08/12/2022.    Ms. Cristela Salgado is no longer have any pain in her right lower extremity.  She does note some intermittent tingling in her right anterior leg that develops with standing.  She denies any other numbness, tingling,  "pins-and-needles.    Ms. Cristela Salgado notes constant bilateral low back pain.  Low back pain is without radiation and described as an \"ache.\"  Low back pain is worse with standing.  Icing provides some relief of the low back pain.    Ms. Cristela Salgado currently rates her pain as 2/10.  She is taking 1 tablet of acetaminophen twice per day.    Ms. Cristela Salgado states that her bilateral lower extremities become weak and tired with walking.  She notes improvement in her symptoms with leaning forward on a shopping cart.  Ms. Cristela Salgado denies any give way episodes of her lower extremities.  She denies any tripping, stumbling, falling.  She is not using any assistive devices for ambulation.  Ms. Cristela Salgado denies any saddle anesthesia, bowel incontinence, bladder incontinence.        ALLERGIES:  Allergies   Allergen Reactions     Lisinopril Cough     Hydrochlorothiazide      Renal insufficiency     Iodine Hives     Iodine contrast     Metformin      Loose stools     Niacin      LEG CRAMPS     Norvasc [Amlodipine Besylate] Swelling     Simvastatin      ACHINESS           MEDICATIONS:  Current Outpatient Medications   Medication Sig Dispense Refill     labetalol (NORMODYNE) 100 MG tablet TAKE 1 TABLET BY MOUTH TWICE A  tablet 2     albuterol (PROAIR HFA/PROVENTIL HFA/VENTOLIN HFA) 108 (90 Base) MCG/ACT inhaler Inhale 2 puffs into the lungs every 6 hours as needed for shortness of breath / dyspnea or wheezing 18 g 11     aspirin 81 MG tablet Take 1 tablet by mouth daily.       diltiazem ER COATED BEADS (CARDIZEM CD) 360 MG 24 hr capsule TAKE 1 CAPSULE BY MOUTH EVERY DAY 90 capsule 2     fluticasone-salmeterol (ADVAIR DISKUS) 500-50 MCG/DOSE inhaler INHALE 1 PUFF INTO THE LUNGS EVERY 12 HOURS 3 Inhaler 3     gabapentin (NEURONTIN) 300 MG capsule Take 1 capsule (300 mg) by mouth 3 times daily 90 capsule 1     hydrALAZINE (APRESOLINE) 50 MG tablet TAKE ONE AND ONE-HALF TABLETS BY MOUTH THREE TIMES " DAILY 405 tablet 3     losartan (COZAAR) 100 MG tablet TAKE 1 TABLET BY MOUTH EVERY DAY 90 tablet 3     methylPREDNISolone (MEDROL DOSEPAK) 4 MG tablet therapy pack Follow Package Directions 21 tablet 0     methylPREDNISolone (MEDROL DOSEPAK) 4 MG tablet therapy pack Follow Package Directions (Patient not taking: Reported on 6/8/2022) 21 tablet 0     omeprazole (PRILOSEC) 40 MG DR capsule TAKE 1 CAPSULE BY MOUTH EVERY DAY 90 capsule 1     rosuvastatin (CRESTOR) 40 MG tablet TAKE 1 TABLET BY MOUTH EVERY DAY 90 tablet 2     tiZANidine (ZANAFLEX) 2 MG tablet TAKE 1 TABLET (2 MG) BY MOUTH 3 TIMES DAILY AS NEEDED FOR MUSCLE SPASMS 90 tablet 1     vitamin D3 (CHOLECALCIFEROL) 50 mcg (2000 units) tablet Take 1 tablet (50 mcg) by mouth daily           PAST MEDICAL HISTORY:  Past Medical History:   Diagnosis Date     CAD (coronary artery disease)      mild on CT angio     CKD (chronic kidney disease) stage 3, GFR 30-59 ml/min (H)      Colon polyps 2009     DDD (degenerative disc disease), lumbosacral 7/8/2022     Esophageal reflux 4/30/2008     Family history of ischemic heart disease      Fatigue 4/11/2013     Hiatal hernia 2009     History of blood transfusion 4/08    after hiatal hernia surgery     Hyperlipidemia LDL goal <70      Macular degeneration 1/3/2012     Mild persistent asthma      Pulmonary hypertension (H) 8/20/2012    mild     Scoliosis     mild      Type 2 diabetes mellitus with diabetic chronic kidney disease  (goal A1C<8) 10/20/2015     Ulcer of the stomach and intestine 2009    Anthony's ulcer     Unspecified essential hypertension      Vitamin D deficiency 1/3/2012         PAST SURGICAL HISTORY:  Past Surgical History:   Procedure Laterality Date     ABDOMEN SURGERY  4/08    hiatal hernia     BREAST SURGERY  1974    biopsy-begign     CARDIAC SURGERY  1995    angiogram     COLONOSCOPY  2008     HERNIA REPAIR  2008     HYSTERECTOMY, IDRIS  1974    Fibroids     ZZC OPEN STOMACH,REPR ULCER,SUTURE  2009     Hiatal hernia, and Anthony's ulcer         FAMILY HISTORY:  Family History   Problem Relation Age of Onset     Heart Disease Father         CAD, DM, PVD     C.A.D. Father         1st MI in 80s     Heart Disease Mother         Rheumatic fever     Hypertension Sister         3 sisters, all with hypertension     C.A.D. Maternal Uncle         age 54 yrs, sudden cardiac     C.A.D. Maternal Uncle         age 55 yrs     Hypertension Sister          SOCIAL HISTORY:  Social History     Socioeconomic History     Marital status:      Spouse name: Not on file     Number of children: 3     Years of education: Not on file     Highest education level: Not on file   Occupational History     Employer: RETIRED   Tobacco Use     Smoking status: Never Smoker     Smokeless tobacco: Never Used   Substance and Sexual Activity     Alcohol use: Yes     Alcohol/week: 0.0 standard drinks     Comment: 2-3 weekly     Drug use: No     Sexual activity: Yes     Partners: Male   Other Topics Concern     Parent/sibling w/ CABG, MI or angioplasty before 65F 55M? No      Service Not Asked     Blood Transfusions Not Asked     Caffeine Concern No     Comment: 1-2 cups daily     Occupational Exposure Not Asked     Hobby Hazards Not Asked     Sleep Concern No     Comment: nocturia     Stress Concern Not Asked     Weight Concern Not Asked     Special Diet Yes     Comment: low salt, limits sugar     Back Care Not Asked     Exercise No     Comment: walking, yardwork, limited due to plantar fascitis     Bike Helmet Not Asked     Seat Belt Not Asked     Self-Exams Not Asked   Social History Narrative     Not on file     Social Determinants of Health     Financial Resource Strain: Not on file   Food Insecurity: Not on file   Transportation Needs: Not on file   Physical Activity: Not on file   Stress: Not on file   Social Connections: Not on file   Intimate Partner Violence: Not on file   Housing Stability: Not on file         PHYSICAL  "EXAMINATION:  Vitals:    08/12/22 1540   BP: 128/72   Pulse: 75   SpO2: 97%   Weight: 60.8 kg (134 lb)   Height: 1.588 m (5' 2.5\")       GENERAL:  No acute distress.  Pleasant and cooperative.   PSYCH:  Normal mood and affect.  HEAD:  Normocephalic.  SPEECH:  No dysarthria.  EYES:  No scleral icterus.  Wearing glasses.  EARS:  Hearing is intact to spoken voice.  NOSE/MOUTH:  Wearing a face mask.  LUNGS:  No respiratory distress.  No increased work of breathing.        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  Careful attention to the numbering convention is recommended prior to any future percutaneous or surgical intervention. Nomenclature is based on twelve thoracic vertebral bodies and 12 paired ribs.  The first nonrib-bearing vertebral body will be labeled L1.     Sagittal balance is 2.9 cm positive.     Lumbar lordosis measures 8.8 degrees. . This was measured from the superior endplate of L1 to the inferior endplate of L5.     Thoracic kyphosis measures 50.9 degrees. . This was measured from the superior endplate of T1 to the inferior endplate of T12.     Dextroconvex scoliosis of the thoracic spine from the superior endplate of T8 to the inferior plate of T12 measuring 15.8 degrees. Levoconvex scoliosis of the lumbar spine from the superior plate of L1 to the inferior plate of L5 measuring 33.4 degrees.      Coronal balance is 1.1 cm to the left of the CSVL.     No focal consolidation or pleural effusion. Nonobstructive bowel gas pattern. Soft tissues unremarkable.        XR LUMBAR BENDING ONLY 2/3 VIEWS 6/8/2022 2:10 PM     INDICATION: Spinal stenosis of lumbar region with neurogenic claudication  COMPARISON: Scoliosis radiographs performed same day. MRI lumbar spine February 15, 2022.     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  This is different from the literature prior lumbar spine MRI February 15, 2022 Careful attention to the numbering convention is recommended prior to any future percutaneous or surgical intervention.     On flexion there is anterolisthesis of L5 on S1 measuring 5 mm with normal alignment on extension. The vertebral bodies of the lumbar spine otherwise have normal stature and alignment. Anterior marginal osteophytes L1/L2, L2/L3 and L3/L4. Multilevel degenerative facet arthropathy, most pronounced at L3/L4-L5/S1. Anterior marginal osteophytes at L1/L2 and L2/L3. Atherosclerotic vascular disease. Soft tissues otherwise unremarkable.        MRI OF THE LUMBAR SPINE WITHOUT CONTRAST February 15, 2022 3:21 PM      HISTORY: Low back pain with worsening right lower extremity radicular  pain.     TECHNIQUE: Multiplanar, multisequence MRI images of the lumbar spine were acquired without IV contrast.     COMPARISON: None.     FINDINGS: There are five lumbar-type vertebrae for the purposes of this dictation.      Five lumbar type vertebral bodies. Normal vertebral body heights. 25 degree levoscoliosis apex at L2-L3. Mild Modic 1 marrow change in the L3-L4 apposing endplate on the right. The conus tip is identified at L1-L2. Visualized extraspinal soft tissues are within normal limits.     T12-L1: Moderate to severe disc height and T2 signal loss. Mild circumferential disc osteophyte complex. Mild bilateral facet arthropathy. No spinal canal or neural foraminal stenosis.      L1-L2: Moderate to severe disc height and T2 signal loss. Mild posterior annular bulge. Mild bilateral facet arthropathy. No spinal canal or neural foraminal stenosis.     L2-L3: Moderate to severe disc height and T2 signal loss. Mild circumferential disc osteophyte complex. Mild bilateral facet arthropathy and thickening of ligamenta flava. No spinal canal stenosis. No neural  foraminal stenosis.     L3-L4: 9 mm leftward subluxation. Moderate disc height and T2 signal loss. Mild-to-moderate circumferential disc osteophyte complex. Mild bilateral facet arthropathy. Moderate to severe spinal canal stenosis. Severe right and mild to moderate left neural foraminal stenosis.      L4-L5: 2 mm degenerative anterolisthesis. Mild disc height loss. Moderate disc T2 signal loss. Mild posterior annular bulge and left foraminal disc osteophyte complex. Moderate bilateral facet arthropathy and thickening of ligamenta flava. Mild/moderate spinal canal stenosis in a trefoil configuration. No right neural foraminal stenosis. Mild left neural foraminal stenosis.     L5-S1: Normal disc height. Moderate disc T2 signal loss. Mild posterior annular bulge. Small caudally directed left central disc herniation. Mild bilateral facet arthropathy. Mild left lateral recess stenosis. No central spinal canal stenosis. No right neural foraminal stenosis. Mild left neural foraminal stenosis.     IMPRESSION:  1.  25 degree levoscoliosis apex at L2-L3.  2.  9 mm L3-L4 leftward subluxation with moderate to severe spinal canal stenosis, severe right neural foraminal stenosis and mild to moderate left neural from stenosis.  3.  Mild/moderate L4-L5 spinal canal stenosis in a trefoil configuration.  4.  Mild left L5-S1 lateral recess stenosis.        ASSESSMENT/PLAN:  Ms. Cristela Salgado is an 81-year-old, right-hand-dominant, female.  She has anterolisthesis of L5 on S1 with dynamic instability, lumbosacral spinal stenosis, thoracolumbar scoliosis, lumbosacral facet arthropathy, and lumbosacral degenerative disc disease.  The majority of her low back pain at this time seems to be related to lumbosacral facet arthropathy.  Discussed diagnoses, pathophysiologies, and treatment options with Ms. Cristela Salgado.  Discussed the options of doing nothing/living with it, physical therapy, chiropractic care, oral medications such as  gabapentin and pregabalin, lumbosacral facet joint medial branch blocks with following radiofrequency ablations, referral to neurosurgery.  Ms. Cristela Salgado will be set up for medial branch blocks of the bilateral L3-L4 and L4-L5 facet joints.        Total Time on encounter:  41 minutes were spent on one more or more of the following:  discussion with patient, history, exam, coordinating care, treatment goals, record review, documenting clinical information, and/or data review.      Samir Mathis MD

## 2022-08-12 NOTE — LETTER
8/12/2022         RE: Cristela Salgado  [address]  [facility] [address]        Dear Colleague,    Thank you for referring your patient, Cristela Salgado, to the [facility]. Please see a copy of my visit note below.    PHYSICAL MEDICINE & REHABILITATION / MEDICAL SPINE        Date:  Aug 12, 2022    Name:  Cristela Salgado  YOB: 1940  MRN:  [id number]          CHART REVIEW:  Reviewed 06/08/2022 note from Dipesh Méndez MD (neurosurgery).  Ms. Cristela Salgado had back and leg pain beginning in February 2022.  Pain began when she was walking up stairs, and Ms. Cristela Salgado's right leg went numb, and she was unable to walk anymore.  Ms. Cristela Salgado would occasionally get pain into the left leg.  Pain was worse with turning over in bed and getting up initially.  Pain was worse with transitioning from sitting to standing.  Ms. Cristela Salgado was started on gabapentin and tizanidine.  She was referred to physical therapy.  She was referred to medical spine.  If she were to fail conservative management, surgery would require a multilevel lumbar fusion procedure.        CHIEF COMPLAINT:  Low back pain.        HISTORY OF PRESENT ILLNESS:  Ms. Cristela Salgado is an 81-year-old, right-hand-dominant, female.    Ms. Cristela Salgado denied any prior or recent injuries of her low back, pelvis, hips, thighs, knees, legs, ankles, feet, toes.     Ms. Cristela Salgado denied any personal or family history of autoimmune diseases, rheumatologic diseases, gout, pseudogout.  She denied any personal or family history of neurologic diseases.  Ms. Cristela Salgado denied any personal or family history of inflammatory bowel diseases.     Ms. Cristela Salgado was last seen in this clinic on 07/08/2022.  On 07/18/2022, Ms. Cristela Salgado had a fluoroscopic guided right paramedian S1-S2 interlaminar epidural corticosteroid injection.  Ms. Cristela Salgado returns to  "clinic today, 08/12/2022.    Ms. Cristela Salgado is no longer have any pain in her right lower extremity.  She does note some intermittent tingling in her right anterior leg that develops with standing.  She denies any other numbness, tingling, pins-and-needles.    Ms. Cristela Salgado notes constant bilateral low back pain.  Low back pain is without radiation and described as an \"ache.\"  Low back pain is worse with standing.  Icing provides some relief of the low back pain.    Ms. Cristela Salgado currently rates her pain as 2/10.  She is taking 1 tablet of acetaminophen twice per day.    Ms. Cristela Salgado states that her bilateral lower extremities become weak and tired with walking.  She notes improvement in her symptoms with leaning forward on a shopping cart.  Ms. Cristela Salgado denies any give way episodes of her lower extremities.  She denies any tripping, stumbling, falling.  She is not using any assistive devices for ambulation.  Ms. Cristela Salgado denies any saddle anesthesia, bowel incontinence, bladder incontinence.        ALLERGIES:  Allergies   Allergen Reactions     Lisinopril Cough     Hydrochlorothiazide      Renal insufficiency     Iodine Hives     Iodine contrast     Metformin      Loose stools     Niacin      LEG CRAMPS     Norvasc [Amlodipine Besylate] Swelling     Simvastatin      ACHINESS           MEDICATIONS:  Current Outpatient Medications   Medication Sig Dispense Refill     labetalol (NORMODYNE) 100 MG tablet TAKE 1 TABLET BY MOUTH TWICE A  tablet 2     albuterol (PROAIR HFA/PROVENTIL HFA/VENTOLIN HFA) 108 (90 Base) MCG/ACT inhaler Inhale 2 puffs into the lungs every 6 hours as needed for shortness of breath / dyspnea or wheezing 18 g 11     aspirin 81 MG tablet Take 1 tablet by mouth daily.       diltiazem ER COATED BEADS (CARDIZEM CD) 360 MG 24 hr capsule TAKE 1 CAPSULE BY MOUTH EVERY DAY 90 capsule 2     fluticasone-salmeterol (ADVAIR DISKUS) 500-50 MCG/DOSE inhaler " INHALE 1 PUFF INTO THE LUNGS EVERY 12 HOURS 3 Inhaler 3     gabapentin (NEURONTIN) 300 MG capsule Take 1 capsule (300 mg) by mouth 3 times daily 90 capsule 1     hydrALAZINE (APRESOLINE) 50 MG tablet TAKE ONE AND ONE-HALF TABLETS BY MOUTH THREE TIMES DAILY 405 tablet 3     losartan (COZAAR) 100 MG tablet TAKE 1 TABLET BY MOUTH EVERY DAY 90 tablet 3     methylPREDNISolone (MEDROL DOSEPAK) 4 MG tablet therapy pack Follow Package Directions 21 tablet 0     methylPREDNISolone (MEDROL DOSEPAK) 4 MG tablet therapy pack Follow Package Directions (Patient not taking: Reported on 6/8/2022) 21 tablet 0     omeprazole (PRILOSEC) 40 MG DR capsule TAKE 1 CAPSULE BY MOUTH EVERY DAY 90 capsule 1     rosuvastatin (CRESTOR) 40 MG tablet TAKE 1 TABLET BY MOUTH EVERY DAY 90 tablet 2     tiZANidine (ZANAFLEX) 2 MG tablet TAKE 1 TABLET (2 MG) BY MOUTH 3 TIMES DAILY AS NEEDED FOR MUSCLE SPASMS 90 tablet 1     vitamin D3 (CHOLECALCIFEROL) 50 mcg (2000 units) tablet Take 1 tablet (50 mcg) by mouth daily           PAST MEDICAL HISTORY:  Past Medical History:   Diagnosis Date     CAD (coronary artery disease)      mild on CT angio     CKD (chronic kidney disease) stage 3, GFR 30-59 ml/min (H)      Colon polyps 2009     DDD (degenerative disc disease), lumbosacral 7/8/2022     Esophageal reflux 4/30/2008     Family history of ischemic heart disease      Fatigue 4/11/2013     Hiatal hernia 2009     History of blood transfusion 4/08    after hiatal hernia surgery     Hyperlipidemia LDL goal <70      Macular degeneration 1/3/2012     Mild persistent asthma      Pulmonary hypertension (H) 8/20/2012    mild     Scoliosis     mild      Type 2 diabetes mellitus with diabetic chronic kidney disease  (goal A1C<8) 10/20/2015     Ulcer of the stomach and intestine 2009    Anthony's ulcer     Unspecified essential hypertension      Vitamin D deficiency 1/3/2012         PAST SURGICAL HISTORY:  Past Surgical History:   Procedure Laterality Date     ABDOMEN  SURGERY  4/08    hiatal hernia     BREAST SURGERY  1974    biopsy-begign     CARDIAC SURGERY  1995    angiogram     COLONOSCOPY  2008     HERNIA REPAIR  2008     HYSTERECTOMY, IDRIS  1974    Fibroids     ZZC OPEN STOMACH,REPR ULCER,SUTURE  2009    Hiatal hernia, and Anthony's ulcer         FAMILY HISTORY:  Family History   Problem Relation Age of Onset     Heart Disease Father         CAD, DM, PVD     C.A.D. Father         1st MI in 80s     Heart Disease Mother         Rheumatic fever     Hypertension Sister         3 sisters, all with hypertension     C.A.D. Maternal Uncle         age 54 yrs, sudden cardiac     C.A.D. Maternal Uncle         age 55 yrs     Hypertension Sister          SOCIAL HISTORY:  Social History     Socioeconomic History     Marital status:      Spouse name: Not on file     Number of children: 3     Years of education: Not on file     Highest education level: Not on file   Occupational History     Employer: RETIRED   Tobacco Use     Smoking status: Never Smoker     Smokeless tobacco: Never Used   Substance and Sexual Activity     Alcohol use: Yes     Alcohol/week: 0.0 standard drinks     Comment: 2-3 weekly     Drug use: No     Sexual activity: Yes     Partners: Male   Other Topics Concern     Parent/sibling w/ CABG, MI or angioplasty before 65F 55M? No      Service Not Asked     Blood Transfusions Not Asked     Caffeine Concern No     Comment: 1-2 cups daily     Occupational Exposure Not Asked     Hobby Hazards Not Asked     Sleep Concern No     Comment: nocturia     Stress Concern Not Asked     Weight Concern Not Asked     Special Diet Yes     Comment: low salt, limits sugar     Back Care Not Asked     Exercise No     Comment: walking, yardwork, limited due to plantar fascitis     Bike Helmet Not Asked     Seat Belt Not Asked     Self-Exams Not Asked   Social History Narrative     Not on file     Social Determinants of Health     Financial Resource Strain: Not on file   Food  "Insecurity: Not on file   Transportation Needs: Not on file   Physical Activity: Not on file   Stress: Not on file   Social Connections: Not on file   Intimate Partner Violence: Not on file   Housing Stability: Not on file         PHYSICAL EXAMINATION:  Vitals:    08/12/22 1540   BP: 128/72   Pulse: 75   SpO2: 97%   Weight: 60.8 kg (134 lb)   Height: 1.588 m (5' 2.5\")       GENERAL:  No acute distress.  Pleasant and cooperative.   PSYCH:  Normal mood and affect.  HEAD:  Normocephalic.  SPEECH:  No dysarthria.  EYES:  No scleral icterus.  Wearing glasses.  EARS:  Hearing is intact to spoken voice.  NOSE/MOUTH:  Wearing a face mask.  LUNGS:  No respiratory distress.  No increased work of breathing.        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  Careful attention to the numbering convention is recommended prior to any future percutaneous or surgical intervention. Nomenclature is based on twelve thoracic vertebral bodies and 12 paired ribs.  The first nonrib-bearing vertebral body will be labeled L1.     Sagittal balance is 2.9 cm positive.     Lumbar lordosis measures 8.8 degrees. . This was measured from the superior endplate of L1 to the inferior endplate of L5.     Thoracic kyphosis measures 50.9 degrees. . This was measured from the superior endplate of T1 to the inferior endplate of T12.     Dextroconvex scoliosis of the thoracic spine from the superior endplate of T8 to the inferior plate of T12 measuring 15.8 degrees. Levoconvex scoliosis of the lumbar spine from the superior plate of L1 to the inferior plate of L5 measuring 33.4 degrees.      Coronal balance is 1.1 cm to the left of the CSVL.     No focal consolidation or pleural effusion. Nonobstructive bowel gas pattern. Soft tissues " unremarkable.        XR LUMBAR BENDING ONLY 2/3 VIEWS 6/8/2022 2:10 PM     INDICATION: Spinal stenosis of lumbar region with neurogenic claudication  COMPARISON: Scoliosis radiographs performed same day. MRI lumbar spine February 15, 2022.     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  This is different from the literature prior lumbar spine MRI February 15, 2022 Careful attention to the numbering convention is recommended prior to any future percutaneous or surgical intervention.     On flexion there is anterolisthesis of L5 on S1 measuring 5 mm with normal alignment on extension. The vertebral bodies of the lumbar spine otherwise have normal stature and alignment. Anterior marginal osteophytes L1/L2, L2/L3 and L3/L4. Multilevel degenerative facet arthropathy, most pronounced at L3/L4-L5/S1. Anterior marginal osteophytes at L1/L2 and L2/L3. Atherosclerotic vascular disease. Soft tissues otherwise unremarkable.        MRI OF THE LUMBAR SPINE WITHOUT CONTRAST February 15, 2022 3:21 PM      HISTORY: Low back pain with worsening right lower extremity radicular  pain.     TECHNIQUE: Multiplanar, multisequence MRI images of the lumbar spine were acquired without IV contrast.     COMPARISON: None.     FINDINGS: There are five lumbar-type vertebrae for the purposes of this dictation.      Five lumbar type vertebral bodies. Normal vertebral body heights. 25 degree levoscoliosis apex at L2-L3. Mild Modic 1 marrow change in the L3-L4 apposing endplate on the right. The conus tip is identified at L1-L2. Visualized extraspinal soft tissues are within normal limits.     T12-L1: Moderate to severe disc height and T2 signal loss. Mild circumferential disc osteophyte complex. Mild bilateral facet arthropathy. No spinal canal or neural foraminal stenosis.      L1-L2: Moderate to severe disc height and T2 signal loss. Mild posterior annular bulge. Mild bilateral facet arthropathy. No  spinal canal or neural foraminal stenosis.     L2-L3: Moderate to severe disc height and T2 signal loss. Mild circumferential disc osteophyte complex. Mild bilateral facet arthropathy and thickening of ligamenta flava. No spinal canal stenosis. No neural foraminal stenosis.     L3-L4: 9 mm leftward subluxation. Moderate disc height and T2 signal loss. Mild-to-moderate circumferential disc osteophyte complex. Mild bilateral facet arthropathy. Moderate to severe spinal canal stenosis. Severe right and mild to moderate left neural foraminal stenosis.      L4-L5: 2 mm degenerative anterolisthesis. Mild disc height loss. Moderate disc T2 signal loss. Mild posterior annular bulge and left foraminal disc osteophyte complex. Moderate bilateral facet arthropathy and thickening of ligamenta flava. Mild/moderate spinal canal stenosis in a trefoil configuration. No right neural foraminal stenosis. Mild left neural foraminal stenosis.     L5-S1: Normal disc height. Moderate disc T2 signal loss. Mild posterior annular bulge. Small caudally directed left central disc herniation. Mild bilateral facet arthropathy. Mild left lateral recess stenosis. No central spinal canal stenosis. No right neural foraminal stenosis. Mild left neural foraminal stenosis.     IMPRESSION:  1.  25 degree levoscoliosis apex at L2-L3.  2.  9 mm L3-L4 leftward subluxation with moderate to severe spinal canal stenosis, severe right neural foraminal stenosis and mild to moderate left neural from stenosis.  3.  Mild/moderate L4-L5 spinal canal stenosis in a trefoil configuration.  4.  Mild left L5-S1 lateral recess stenosis.        ASSESSMENT/PLAN:  Ms. Cristela Salgado is an 81-year-old, right-hand-dominant, female.  She has anterolisthesis of L5 on S1 with dynamic instability, lumbosacral spinal stenosis, thoracolumbar scoliosis, lumbosacral facet arthropathy, and lumbosacral degenerative disc disease.  The majority of her low back pain at this time seems  to be related to lumbosacral facet arthropathy.  Discussed diagnoses, pathophysiologies, and treatment options with Ms. Cristela Salgado.  Discussed the options of doing nothing/living with it, physical therapy, chiropractic care, oral medications such as gabapentin and pregabalin, lumbosacral facet joint medial branch blocks with following radiofrequency ablations, referral to neurosurgery.  Ms. Cristela Salgado will be set up for medial branch blocks of the bilateral L3-L4 and L4-L5 facet joints.        Total Time on encounter:  41 minutes were spent on one more or more of the following:  discussion with patient, history, exam, coordinating care, treatment goals, record review, documenting clinical information, and/or data review.      Samir Mathis MD

## 2022-08-12 NOTE — PATIENT INSTRUCTIONS
Please schedule fluoroscopic-guided medial branch blocks with me.  The [facility] Procedure Scheduling Team will call you to schedule your procedure in the next 0-3 days.  You can also call them directly at [phone].

## 2022-08-12 NOTE — TELEPHONE ENCOUNTER
Routing refill request to provider for review/approval because:  Drug interaction warning    Damián Robertosn RN  [facility] Triage Nurse

## 2022-08-13 RX ORDER — DILTIAZEM HYDROCHLORIDE 360 MG/1
CAPSULE, EXTENDED RELEASE ORAL
Qty: 90 CAPSULE | Refills: 1 | Status: SHIPPED | OUTPATIENT
Start: 2022-08-13 | End: 2022-12-11

## 2022-08-15 ENCOUNTER — TELEPHONE (OUTPATIENT)
Dept: INTERNAL MEDICINE | Facility: CLINIC | Age: 82
End: 2022-08-15

## 2022-08-15 DIAGNOSIS — I10 ESSENTIAL HYPERTENSION: ICD-10-CM

## 2022-08-15 DIAGNOSIS — K21.9 GASTROESOPHAGEAL REFLUX DISEASE WITHOUT ESOPHAGITIS: ICD-10-CM

## 2022-08-15 NOTE — TELEPHONE ENCOUNTER
Reason for Call:  Other appointment    Detailed comments: ankle and feet swelling- per Neurologist informed her to follow up with PCP    Would like to be squeezed in with Dr Snow.     Phone Number Patient can be reached at: Home number on file [phone] (home)    Best Time: anytime     Can we leave a detailed message on this number? YES    Call taken on 8/15/2022 at 8:45 AM by Remedios Shay

## 2022-08-17 RX ORDER — OMEPRAZOLE 40 MG/1
CAPSULE, DELAYED RELEASE ORAL
Qty: 90 CAPSULE | Refills: 2 | Status: SHIPPED | OUTPATIENT
Start: 2022-08-17 | End: 2023-03-16

## 2022-08-17 RX ORDER — LOSARTAN POTASSIUM 100 MG/1
TABLET ORAL
Qty: 90 TABLET | Refills: 2 | Status: SHIPPED | OUTPATIENT
Start: 2022-08-17 | End: 2023-03-16

## 2022-08-17 NOTE — TELEPHONE ENCOUNTER
Prescription approved per [facility] Refill Protocol.  Damián Robertson RN  [facility] Triage Nurse

## 2022-08-25 ENCOUNTER — TELEPHONE (OUTPATIENT)
Dept: GENERAL RADIOLOGY | Facility: CLINIC | Age: 82
End: 2022-08-25

## 2022-08-25 NOTE — TELEPHONE ENCOUNTER
Spoke with patient about appointment with Dr Mathis on 8/29.  Patient states that she ist on blood thinners, take aspirin.  Patient does not have any current illness and has not have a vaccine or immunization within the past two weeks.  Patient knows to have a  and arrive 15 minutes before injection start time. Patient knows that pain needs to be a 5/10 before procedure to proceed with medial branch blocks.

## 2022-08-26 ENCOUNTER — OFFICE VISIT (OUTPATIENT)
Dept: INTERNAL MEDICINE | Facility: CLINIC | Age: 82
End: 2022-08-26
Payer: COMMERCIAL

## 2022-08-26 VITALS
HEART RATE: 76 BPM | HEIGHT: 63 IN | OXYGEN SATURATION: 98 % | BODY MASS INDEX: 22.89 KG/M2 | DIASTOLIC BLOOD PRESSURE: 68 MMHG | TEMPERATURE: 98.3 F | SYSTOLIC BLOOD PRESSURE: 119 MMHG | WEIGHT: 129.2 LBS

## 2022-08-26 DIAGNOSIS — I27.20 PULMONARY HYPERTENSION (H): ICD-10-CM

## 2022-08-26 DIAGNOSIS — N18.31 STAGE 3A CHRONIC KIDNEY DISEASE (H): ICD-10-CM

## 2022-08-26 DIAGNOSIS — I10 ESSENTIAL HYPERTENSION: ICD-10-CM

## 2022-08-26 DIAGNOSIS — N18.31 TYPE 2 DIABETES MELLITUS WITH STAGE 3A CHRONIC KIDNEY DISEASE, WITHOUT LONG-TERM CURRENT USE OF INSULIN (H): ICD-10-CM

## 2022-08-26 DIAGNOSIS — E78.5 HYPERLIPIDEMIA LDL GOAL <70: ICD-10-CM

## 2022-08-26 DIAGNOSIS — M48.07 LUMBOSACRAL SPINAL STENOSIS: ICD-10-CM

## 2022-08-26 DIAGNOSIS — R60.9 EDEMA, UNSPECIFIED TYPE: ICD-10-CM

## 2022-08-26 DIAGNOSIS — R01.1 HEART MURMUR: ICD-10-CM

## 2022-08-26 DIAGNOSIS — E11.22 TYPE 2 DIABETES MELLITUS WITH STAGE 3A CHRONIC KIDNEY DISEASE, WITHOUT LONG-TERM CURRENT USE OF INSULIN (H): ICD-10-CM

## 2022-08-26 PROCEDURE — 99214 OFFICE O/P EST MOD 30 MIN: CPT | Performed by: INTERNAL MEDICINE

## 2022-08-26 RX ORDER — HYDRALAZINE HYDROCHLORIDE 50 MG/1
50 TABLET, FILM COATED ORAL 3 TIMES DAILY
Qty: 270 TABLET | Refills: 3 | Status: SHIPPED | OUTPATIENT
Start: 2022-08-26 | End: 2023-03-16

## 2022-08-26 RX ORDER — EZETIMIBE 10 MG/1
10 TABLET ORAL DAILY
Qty: 30 TABLET | Refills: 11 | Status: SHIPPED | OUTPATIENT
Start: 2022-08-26 | End: 2023-03-16

## 2022-08-26 ASSESSMENT — ASTHMA QUESTIONNAIRES
QUESTION_4 LAST FOUR WEEKS HOW OFTEN HAVE YOU USED YOUR RESCUE INHALER OR NEBULIZER MEDICATION (SUCH AS ALBUTEROL): NOT AT ALL
QUESTION_1 LAST FOUR WEEKS HOW MUCH OF THE TIME DID YOUR ASTHMA KEEP YOU FROM GETTING AS MUCH DONE AT WORK, SCHOOL OR AT HOME: NONE OF THE TIME
ACT_TOTALSCORE: 24
QUESTION_2 LAST FOUR WEEKS HOW OFTEN HAVE YOU HAD SHORTNESS OF BREATH: NOT AT ALL
ACT_TOTALSCORE: 24
QUESTION_5 LAST FOUR WEEKS HOW WOULD YOU RATE YOUR ASTHMA CONTROL: WELL CONTROLLED
QUESTION_3 LAST FOUR WEEKS HOW OFTEN DID YOUR ASTHMA SYMPTOMS (WHEEZING, COUGHING, SHORTNESS OF BREATH, CHEST TIGHTNESS OR PAIN) WAKE YOU UP AT NIGHT OR EARLIER THAN USUAL IN THE MORNING: NOT AT ALL

## 2022-08-26 NOTE — PATIENT INSTRUCTIONS
Ezetimibe 10mg tab,1 tab daily for cholesterol management  Continue other medications  Call  [phone] or use [facility] to schedule a future lab appointment  fasting in 1 month.   For fasting labs, please refrain from eating for 8 hours or more.   Drink 2 glasses of water before your lab appointment. It is fine to take your  oral medications on the morning of the lab test as usual  Repeat nonfasting kidney/diabetic labs in 3 months  I would recommend you receive an influenza (flu) vaccine  this Fall (mid/late October)  ECHO heart at [facility] cardiology clinic. Call [phone] to schedule  Maintain low salt intake  Compression stockings on AM and off later PM for leg swelling. If not helping, then let me know and possible  addition hydrochlorathiazide and reduction of Diltiazem dose   I would recommend a covid booster vaccination. You may have it done at any pharmacy. If you wish to have it done at a [place] pharmacy, then go to [email or web address] to schedule a vaccination appointment

## 2022-08-26 NOTE — PROGRESS NOTES
ASSESSMENT:    1. Edema, unspecified type  Likely related to decreased activity and use of diltiazem.  No orthopnea or PND or current shortness of breath.  Given blood pressure is doing well, will leave those meds as they are for now and have patient try using compression stockings which she is agreeable to.  If not helping, will then consider addition of low-dose hydrochlorothiazide in the future at 12.5 mg dose and reduce dose of diltiazem.  Patient previously had some renal insufficiency with high-dose hydrochlorothiazide and otherwise tolerating medication okay  - Compression Sleeve/Stocking Order for DME - ONLY FOR DME    2. Type 2 diabetes mellitus with stage 3a chronic kidney disease, without long-term current use of insulin (H)  Controlled.  Continue diet therapy and repeat labs in 3 months  - Basic metabolic panel; Future  - Hemoglobin A1c; Future    3. Stage 3a chronic kidney disease (H)  Stable.  Recheck labs 3 months  - Basic metabolic panel; Future    4. Pulmonary hypertension (H)  Mild on previous echo.  Denies current shortness of breath.  Could be contributing some to edema also.  Echo was ordered  - Echocardiogram Complete; Future    5. Essential hypertension   Controlled.See #1 above.  Patient states she is only taking hydralazine 1 tablet 3 times daily.  Medication list updated  - hydrALAZINE (APRESOLINE) 50 MG tablet; Take 1 tablet (50 mg) by mouth 3 times daily  Dispense: 270 tablet; Refill: 3    6. Hyperlipidemia LDL goal <70  LDL above goal with history CAD.  On maximum dose Crestor.  We will add Zetia and repeat labs in a month  - ezetimibe (ZETIA) 10 MG tablet; Take 1 tablet (10 mg) by mouth daily  Dispense: 30 tablet; Refill: 11  - Lipid panel reflex to direct LDL Fasting; Future  - Hepatic function panel; Future  - CK total; Future    7. Heart murmur  History aortic stenosis, mitral/tricuspid regurgitation.  Needs echo follow-up  - Echocardiogram Complete; Future    8. Lumbosacral spinal  stenosis  Improves epidural steroid injection.  Possible future lumbar nerve ablation.  Continue management per ortho      PLAN:  Ezetimibe 10mg tab,1 tab daily for cholesterol management  Continue other medications  Call  [phone] or use [employer] to schedule a future lab appointment  fasting in 1 month.   For fasting labs, please refrain from eating for 8 hours or more.   Drink 2 glasses of water before your lab appointment. It is fine to take your  oral medications on the morning of the lab test as usual  Repeat nonfasting kidney/diabetic labs in 3 months  I would recommend you receive an influenza (flu) vaccine  this Fall (mid/late October)  ECHO heart at [facility] cardiology clinic. Call [phone] to schedule  Maintain low salt intake  Compression stockings on AM and off later PM for leg swelling. If not helping, then let me know and possible  addition hydrochlorathiazide and reduction of Diltiazem dose   I would recommend a covid booster vaccination. You may have it done at any pharmacy. If you wish to have it done at a [facility] pharmacy, then go to [email or web address] to schedule a vaccination appointment    (Chart documentation was completed, in part, with [employer] voice-recognition software. Even though reviewed, some grammatical, spelling, and word errors may remain.)    Jose De Jesus Snow MD  Internal Medicine Department  [facility]      Wilton Archibald is a 81 year old, presenting for the following health issues:  Edema (Feet and ankle)      History of Present Illness       Reason for visit:  Swelling in feet and ankles  Symptom onset:  More than a month  Symptoms include:  Swelling  Symptom intensity:  Moderate  Symptom progression:  Staying the same  Had these symptoms before:  No  What makes it worse:  No  What makes it better:  Laying in bed    She eats 2-3 servings of fruits and vegetables daily.She consumes 1 sweetened beverage(s) daily.She exercises with enough  effort to increase her heart rate 9 or less minutes per day.  She exercises with enough effort to increase her heart rate 3 or less days per week.   She is taking medications regularly.       Feet and ankle swelling  Right left or both? Both  When did this start? 3-4 months  Any other symptoms? In the morning fine, by end of day swollen       Most recent lab results reviewed with pt.      Component      Latest Ref Rng & Units 12/21/2021 2/8/2022 5/28/2022   Sodium      133 - 144 mmol/L 135  139   Potassium      3.4 - 5.3 mmol/L 4.0  4.1   Chloride      94 - 109 mmol/L 105  109   Carbon Dioxide      20 - 32 mmol/L 25  25   Anion Gap      3 - 14 mmol/L 5  5   Urea Nitrogen      7 - 30 mg/dL 15  13   Creatinine      0.52 - 1.04 mg/dL 1.05 (H)  1.03   Calcium      8.5 - 10.1 mg/dL 9.5  8.9   Glucose      70 - 99 mg/dL 157 (H)  134 (H)   GFR Estimate      >60 mL/min/1.73m2 53 (L)  55 (L)   Cholesterol      <200 mg/dL 214 (H)  207 (H)   Triglycerides      <150 mg/dL 146  132   HDL Cholesterol      >=50 mg/dL 74  85   LDL Cholesterol Calculated      <=100 mg/dL 111 (H)  96   Non HDL Cholesterol      <130 mg/dL 140 (H)  122   Patient Fasting > 8hrs?       Yes  Yes   Creatinine Urine      mg/dL 89     Albumin Urine mg/L      mg/L 16     Albumin Urine mg/g Cr      0.00 - 25.00 mg/g Cr 17.98     Hemoglobin A1C      0.0 - 5.6 % 6.8 (H)  6.7 (H)   TSH      0.40 - 4.00 mU/L  2.65      Also following up regarding diabetic control for age with diet therapy.  Most recent A1c is above showing good control.   LDL elevated with history of CAD and goal less than 70.  Stable CKD.  Chronic low back pain.  Working with Ortho and had epidural steroid injection about a month ago with some improvement.  Being considered for possible nerve ablation procedure in the future.  Because of back pain issues, has not been as active lately and sitting more which has contributed to lower extremity edema.  Patient's breathing is doing well.  Denies  "current shortness of breath symptoms, orthopnea, PND.  On lower salt intake.  History of mild aortic stenosis with tricuspid and mitral regurgitation.  Due for follow-up echo  Edema in ankles minimal at the beginning of the day but worsens as the day goes on.  No longer on gabapentin as was stopped by Ortho per patient       Additional ROS:   Constitutional, HEENT, Cardiovascular, Pulmonary, GI and , Neuro, MSK and Psych review of systems/symptoms are otherwise negative or unchanged from previous, except as noted above.      OBJECTIVE:  /68   Pulse 76   Temp 98.3  F (36.8  C) (Oral)   Ht 1.588 m (5' 2.5\")   Wt 58.6 kg (129 lb 3.2 oz)   SpO2 98%   BMI 23.25 kg/m     Estimated body mass index is 23.25 kg/m  as calculated from the following:    Height as of this encounter: 1.588 m (5' 2.5\").    Weight as of this encounter: 58.6 kg (129 lb 3.2 oz).     Neck: no adenopathy. Thyroid normal to palpation. No bruits.  No JVD  Pulm: Lungs clear to auscultation   CV: Regular rates and rhythm.  2/6 JANES RUSB, LLSB and apex  GI: Soft, nontender, Normal active bowel sounds, No hepatosplenomegaly or masses palpable  Ext: Peripheral pulses intact. Mild BLE edema.  Neuro: Normal strength and tone, sensory exam grossly normal  Back: Tenderness to palpation bilateral paralumbar musculature.  Negative straight leg raise test bilaterally      "

## 2022-08-28 RX ORDER — GADOBUTROL 604.72 MG/ML
1 INJECTION INTRAVENOUS ONCE
Status: DISCONTINUED | OUTPATIENT
Start: 2022-08-29 | End: 2022-08-30 | Stop reason: HOSPADM

## 2022-08-28 RX ORDER — BUPIVACAINE HYDROCHLORIDE 5 MG/ML
3 INJECTION, SOLUTION EPIDURAL; INTRACAUDAL ONCE
Status: COMPLETED | OUTPATIENT
Start: 2022-08-29 | End: 2022-08-29

## 2022-08-28 RX ORDER — LIDOCAINE HYDROCHLORIDE 10 MG/ML
5 INJECTION, SOLUTION EPIDURAL; INFILTRATION; INTRACAUDAL; PERINEURAL ONCE
Status: COMPLETED | OUTPATIENT
Start: 2022-08-29 | End: 2022-08-29

## 2022-08-29 ENCOUNTER — HOSPITAL ENCOUNTER (OUTPATIENT)
Dept: GENERAL RADIOLOGY | Facility: CLINIC | Age: 82
Discharge: HOME | End: 2022-08-29
Attending: PHYSICAL MEDICINE & REHABILITATION | Admitting: PHYSICAL MEDICINE & REHABILITATION
Payer: COMMERCIAL

## 2022-08-29 VITALS
SYSTOLIC BLOOD PRESSURE: 152 MMHG | OXYGEN SATURATION: 97 % | RESPIRATION RATE: 18 BRPM | HEART RATE: 81 BPM | DIASTOLIC BLOOD PRESSURE: 71 MMHG

## 2022-08-29 DIAGNOSIS — M47.817 FACET ARTHROPATHY, LUMBOSACRAL: Primary | ICD-10-CM

## 2022-08-29 DIAGNOSIS — M41.9 SCOLIOSIS OF THORACOLUMBAR SPINE, UNSPECIFIED SCOLIOSIS TYPE: ICD-10-CM

## 2022-08-29 DIAGNOSIS — M43.17 ANTEROLISTHESIS OF LUMBOSACRAL SPINE: ICD-10-CM

## 2022-08-29 PROCEDURE — 64493 INJ PARAVERT F JNT L/S 1 LEV: CPT | Mod: 50 | Performed by: PHYSICAL MEDICINE & REHABILITATION

## 2022-08-29 PROCEDURE — 64494 INJ PARAVERT F JNT L/S 2 LEV: CPT | Mod: 50 | Performed by: PHYSICAL MEDICINE & REHABILITATION

## 2022-08-29 PROCEDURE — 255N000002 HC RX 255 OP 636: Performed by: PHYSICAL MEDICINE & REHABILITATION

## 2022-08-29 PROCEDURE — 64493 INJ PARAVERT F JNT L/S 1 LEV: CPT | Mod: 50

## 2022-08-29 PROCEDURE — 250N000009 HC RX 250

## 2022-08-29 RX ORDER — IOPAMIDOL 408 MG/ML
1 INJECTION, SOLUTION INTRATHECAL ONCE
Status: COMPLETED | OUTPATIENT
Start: 2022-08-29 | End: 2022-08-29

## 2022-08-29 RX ORDER — LIDOCAINE HYDROCHLORIDE 10 MG/ML
INJECTION, SOLUTION EPIDURAL; INFILTRATION; INTRACAUDAL; PERINEURAL
Status: COMPLETED
Start: 2022-08-29 | End: 2022-08-29

## 2022-08-29 RX ORDER — BUPIVACAINE HYDROCHLORIDE 5 MG/ML
INJECTION, SOLUTION EPIDURAL; INTRACAUDAL
Status: COMPLETED
Start: 2022-08-29 | End: 2022-08-29

## 2022-08-29 RX ADMIN — LIDOCAINE HYDROCHLORIDE 1.5 ML: 10 INJECTION, SOLUTION EPIDURAL; INFILTRATION; INTRACAUDAL at 13:36

## 2022-08-29 RX ADMIN — BUPIVACAINE HYDROCHLORIDE 3 ML: 5 INJECTION, SOLUTION EPIDURAL; INTRACAUDAL at 13:35

## 2022-08-29 RX ADMIN — IOPAMIDOL 1 ML: 408 INJECTION, SOLUTION INTRATHECAL at 13:38

## 2022-08-29 RX ADMIN — LIDOCAINE HYDROCHLORIDE 1.5 ML: 10 INJECTION, SOLUTION EPIDURAL; INFILTRATION; INTRACAUDAL; PERINEURAL at 13:36

## 2022-08-29 RX ADMIN — BUPIVACAINE HYDROCHLORIDE 3 ML: 5 INJECTION, SOLUTION EPIDURAL; INTRACAUDAL; PERINEURAL at 13:35

## 2022-08-29 NOTE — PROGRESS NOTES
Patient tolerated bilateral medial branch blocks of L3-L4 and L4 and L5 well by Dr Mathis.  Site Clean Dry and intact, dressing applied with no drainage.  Patient rated pre procedural pain at 6-7/10 and post pain at 0/10.  Patient home per , understands written and oral instructions.    Catie Villegas RN, BSN

## 2022-09-15 ENCOUNTER — TELEPHONE (OUTPATIENT)
Dept: ORTHOPEDICS | Facility: CLINIC | Age: 82
End: 2022-09-15

## 2022-09-19 PROBLEM — G89.29 CHRONIC BILATERAL LOW BACK PAIN WITH BILATERAL SCIATICA: Status: RESOLVED | Noted: 2022-06-20 | Resolved: 2022-09-19

## 2022-09-19 PROBLEM — M54.42 CHRONIC BILATERAL LOW BACK PAIN WITH BILATERAL SCIATICA: Status: RESOLVED | Noted: 2022-06-20 | Resolved: 2022-09-19

## 2022-09-19 PROBLEM — M54.41 CHRONIC BILATERAL LOW BACK PAIN WITH BILATERAL SCIATICA: Status: RESOLVED | Noted: 2022-06-20 | Resolved: 2022-09-19

## 2022-09-19 NOTE — PROGRESS NOTES
Cristela JOLLY Fortestacey was seen 2 times for evaluation and treatment.  Patient did not return for further treatment and current status is unknown.  Due to short treatment duration, no objective or functional changes were made.  Please see goal flow sheet from episode noted date below and initial evaluation for further information.    Linked Episodes   Type: Episode: Status: Noted: Resolved: Last update: Updated by:   PHYSICAL THERAPY R lumbar radiculopathy 6-20-22 Active 6/20/2022 6/27/2022  1:55 PM Evelyn Post, PT      Comments:

## 2022-09-23 DIAGNOSIS — E78.5 HYPERLIPIDEMIA LDL GOAL <70: ICD-10-CM

## 2022-09-23 RX ORDER — EZETIMIBE 10 MG/1
10 TABLET ORAL DAILY
Qty: 30 TABLET | Refills: 11 | OUTPATIENT
Start: 2022-09-23

## 2022-09-23 NOTE — TELEPHONE ENCOUNTER
Called and spoke with pharmacy. Script sent on 8/26/22 for 30 tabs plus 11 refills. Confirmed that pharmacy received that script and patient still has refills on file.     Radames Mcgee RN

## 2022-09-26 ENCOUNTER — HOSPITAL ENCOUNTER (OUTPATIENT)
Dept: CARDIOLOGY | Facility: CLINIC | Age: 82
Discharge: HOME | End: 2022-09-26
Attending: INTERNAL MEDICINE | Admitting: INTERNAL MEDICINE
Payer: COMMERCIAL

## 2022-09-26 DIAGNOSIS — I27.20 PULMONARY HYPERTENSION (H): ICD-10-CM

## 2022-09-26 DIAGNOSIS — R01.1 HEART MURMUR: ICD-10-CM

## 2022-09-26 LAB — LVEF ECHO: NORMAL

## 2022-09-26 PROCEDURE — 93306 TTE W/DOPPLER COMPLETE: CPT

## 2022-09-26 PROCEDURE — 93306 TTE W/DOPPLER COMPLETE: CPT | Mod: 26 | Performed by: INTERNAL MEDICINE

## 2022-09-30 ENCOUNTER — LAB (OUTPATIENT)
Dept: LAB | Facility: CLINIC | Age: 82
End: 2022-09-30
Payer: COMMERCIAL

## 2022-09-30 DIAGNOSIS — E78.5 HYPERLIPIDEMIA LDL GOAL <70: ICD-10-CM

## 2022-09-30 PROCEDURE — 80076 HEPATIC FUNCTION PANEL: CPT

## 2022-09-30 PROCEDURE — 82550 ASSAY OF CK (CPK): CPT

## 2022-09-30 PROCEDURE — 80061 LIPID PANEL: CPT

## 2022-09-30 PROCEDURE — 36415 COLL VENOUS BLD VENIPUNCTURE: CPT

## 2022-10-01 LAB
ALBUMIN SERPL-MCNC: 3.5 G/DL (ref 3.4–5)
ALP SERPL-CCNC: 82 U/L (ref 40–150)
ALT SERPL W P-5'-P-CCNC: 18 U/L (ref 0–50)
AST SERPL W P-5'-P-CCNC: 12 U/L (ref 0–45)
BILIRUB DIRECT SERPL-MCNC: 0.1 MG/DL (ref 0–0.2)
BILIRUB SERPL-MCNC: 0.5 MG/DL (ref 0.2–1.3)
CHOLEST SERPL-MCNC: 180 MG/DL
CK SERPL-CCNC: 40 U/L (ref 30–225)
FASTING STATUS PATIENT QL REPORTED: YES
HDLC SERPL-MCNC: 76 MG/DL
LDLC SERPL CALC-MCNC: 78 MG/DL
NONHDLC SERPL-MCNC: 104 MG/DL
PROT SERPL-MCNC: 6 G/DL (ref 6.8–8.8)
TRIGL SERPL-MCNC: 128 MG/DL

## 2022-11-14 ENCOUNTER — NURSE TRIAGE (OUTPATIENT)
Dept: NURSING | Facility: CLINIC | Age: 82
End: 2022-11-14

## 2022-11-14 ENCOUNTER — VIRTUAL VISIT (OUTPATIENT)
Dept: FAMILY MEDICINE | Facility: CLINIC | Age: 82
End: 2022-11-14
Payer: COMMERCIAL

## 2022-11-14 DIAGNOSIS — I27.20 PULMONARY HYPERTENSION (H): Primary | ICD-10-CM

## 2022-11-14 DIAGNOSIS — U07.1 INFECTION DUE TO 2019 NOVEL CORONAVIRUS: ICD-10-CM

## 2022-11-14 DIAGNOSIS — I35.0 NONRHEUMATIC AORTIC VALVE STENOSIS: ICD-10-CM

## 2022-11-14 PROBLEM — R01.1 HEART MURMUR: Status: RESOLVED | Noted: 2021-02-08 | Resolved: 2022-11-14

## 2022-11-14 PROCEDURE — 99442 PR PHYSICIAN TELEPHONE EVALUATION 11-20 MIN: CPT | Mod: CS | Performed by: FAMILY MEDICINE

## 2022-11-14 NOTE — ASSESSMENT & PLAN NOTE
Discussed isolation masking vaccinations treatment options.  She will have to hold her Advair and rosuvastatin.  Delay COVID booster 2 months

## 2022-11-14 NOTE — TELEPHONE ENCOUNTER
"Nurse Triage SBAR    Is this a 2nd Level Triage? YES, LICENSED PRACTITIONER REVIEW IS REQUIRED    Situation: Covid positive    Background: Patients symptoms started Saturday night and she tested positive this morning. Her symptoms include a cough and a low grade fever (100.5) at the time of call. She denies shortness of breath, denies difficulty breathing, denies chest pain or pressure.     Assessment: High risk covid positive per protocol    Protocol Recommended Disposition:   Discuss With PCP And Callback By Nurse Within 1 Hour    Recommendation:     Routing per T protocol. She is scheduling a telephone visit for anti-viral treatment. Please contact her with any further recommendations.      Routed to provider     YANIV CARTAGENA RN      Does the patient meet one of the following criteria for ADS visit consideration? 16+ years old, with an [facility] PCP     TIP  Providers, please consider if this condition is appropriate for management at one of our Acute and Diagnostic Services sites.     If patient is a good candidate, please use dotphrase <dot>triageresponse and select Refer to ADS to document.                              Coronavirus (COVID-19) Notification    Caller Name (Patient, parent, daughter/son, grandparent, etc)  Cristela    Reason for call  Notify of Positive Coronavirus (COVID-19) lab results, assess symptoms,  review  [facility] recommendations    Lab Result    Lab test:  2019-nCoV rRt-PCR or SARS-CoV-2 PCR    Oropharyngeal AND/OR nasopharyngeal swabs is POSITIVE for 2019-nCoV RNA/SARS-COV-2 PCR (COVID-19 virus)    Brief introduction script  Introduce self then review script:  \"I am calling on behalf of [employer].  We were notified that your Coronavirus test (COVID-19) for was POSITIVE for the virus.\"    Gather patient reported symptoms      Assessment  Current Symptoms at time of phone call, reported by patient: (if no symptoms, document No symptoms]    Cough, low grade fever  Date " of Symptom(s) onset (if applicable)    11/12/2022    If at time of call, Patients symptoms hare worsened, the Patient should contact 911 or have someone drive them to Emergency Dept promptly:         If Patient calling 911, inform 911 personal that you have tested positive for the Coronavirus (COVID-19).  Place mask on and await 911 to arrive.       If Emergency Dept, If possible, please have another adult drive you to the Emergency Dept but you need to wear mask when in contact with other people.      Monoclonal Antibody Administration    You may be eligible to receive a new treatment with a monoclonal antibody for preventing hospitalization in patients at high risk for complications from COVID-19. This medication is still experimental and available on a limited basis; it is given through an IV and must be given at an infusion center. Please note that not all people who are eligible will receive the medication since it is in limited supply.  Is the patient symptomatic and onset of symptoms within the last 7 days?  Yes  Is the patient interested in a visit with a provider to discuss treatment options?: Yes  Is the patient seen at [facility]?  No: Warm transfer caller to [phone] to be scheduled with a virtual urgent provider.  During transfer, instruct  on appropriate time frame for visit     Review information with Patient    Your result was positive. This means you have COVID-19 (coronavirus).      How can I protect others?    These guidelines are for isolating before returning to work, school or .          If you DO have symptoms:      o    Stay home and away from others          ?    For at least 5 days after your symptoms started, AND           ?    You are fever free for 24 hours (with no medicine that reduces fever), AND          ?    Your other symptoms are better.      o    Wear a mask for 10 full days any time you are around others.       If you DON'T have symptoms:      o     Stay at home and away from others for at least 5 days after your positive test.      o    Wear a mask for 10 full days any time you are around others.    There may be different guidelines for healthcare facilities. Please check with the specific sites before arriving.     If you've been told by a doctor that you were severely ill with COVID-19 or are immunocompromised, you should isolate for at least 10 days.    You should not go back to work until you meet the guidelines above for ending your home isolation. You don't need to be retested for COVID-19 before going back to work--studies show that you won't spread the virus if it's been at least 10 days since your symptoms started (or 20 days, if you have a weak immune system).    Employers, schools, and daycares: This is an official notice for this person's medical guidelines for returning in-person. They must meet the above guidelines before going back to work, school, or  in person.    You will receive a positive COVID-19 letter via [employer] or the mail soon with additional self-care information.      Would you like me to review some of that information with you now?  Yes    How can I take care of myself?      Get lots of rest. Drink extra fluids (unless a doctor has told you not to).      Take Tylenol (acetaminophen) for fever or pain. If you have liver or kidney problems, ask your family doctor if it's okay to take Tylenol.     Take either:     650 mg (two 325 mg pills) every 4 to 6 hours, or     1,000 mg (two 500 mg pills) every 8 hours as needed.     Note: Do not take more than 3,000 mg in one day. Acetaminophen is found in many medicines (both prescribed and over-the-counter medicines). Read all labels to be sure you don't take too much.    For children, check the Tylenol bottle for the right dose (based on their age or weight).      If you have other health problems (like cancer, heart failure, an organ transplant or severe kidney disease): Call your  specialty clinic if you don't feel better in the next 2 days.      Know when to call 911: Emergency warning signs include:    Trouble breathing or shortness of breath    Pain or pressure in the chest that doesn't go away    Feeling confused like you haven't felt before, or not being able to wake up    Bluish-colored lips or face        If you were tested for an upcoming procedure, please contact your provider for next steps.     YANIV CARTAGENA RN                            Reason for Disposition    HIGH RISK for severe COVID complications (e.g., weak immune system, age > 64 years, obesity with BMI > 25, pregnant, chronic lung disease or other chronic medical condition) (Exception: Already seen by PCP and no new or worsening symptoms.)    Additional Information    Negative: SEVERE difficulty breathing (e.g., struggling for each breath, speaks in single words)    Negative: Difficult to awaken or acting confused (e.g., disoriented, slurred speech)    Negative: Bluish (or gray) lips or face now    Negative: Shock suspected (e.g., cold/pale/clammy skin, too weak to stand, low BP, rapid pulse)    Negative: Sounds like a life-threatening emergency to the triager    Negative: [1] Diagnosed or suspected COVID-19 AND [2] symptoms lasting 3 or more weeks    Negative: [1] COVID-19 exposure AND [2] no symptoms    Negative: COVID-19 vaccine reaction suspected (e.g., fever, headache, muscle aches) occurring 1 to 3 days after getting vaccine    Negative: COVID-19 vaccine, questions about    Negative: [1] Lives with someone known to have influenza (flu test positive) AND [2] flu-like symptoms (e.g., cough, runny nose, sore throat, SOB; with or without fever)    Negative: [1] Adult with possible COVID-19 symptoms AND [2] triager concerned about severity of symptoms or other causes    Negative: COVID-19 and breastfeeding, questions about    Negative: SEVERE or constant chest pain or pressure  (Exception: Mild central chest pain,  present only when coughing.)    Negative: MODERATE difficulty breathing (e.g., speaks in phrases, SOB even at rest, pulse 100-120)    Negative: Headache and stiff neck (can't touch chin to chest)    Negative: Oxygen level (e.g., pulse oximetry) 90 percent or lower    Negative: Chest pain or pressure    Negative: Patient sounds very sick or weak to the triager    Negative: MILD difficulty breathing (e.g., minimal/no SOB at rest, SOB with walking, pulse <100)    Negative: Fever > 103 F (39.4 C)    Negative: [1] Fever > 101 F (38.3 C) AND [2] over 60 years of age    Negative: [1] Fever > 100.0 F (37.8 C) AND [2] bedridden (e.g., nursing home patient, CVA, chronic illness, recovering from surgery)    Protocols used: CORONAVIRUS (COVID-19) DIAGNOSED OR [id number] 1.18.2022

## 2022-11-14 NOTE — PATIENT INSTRUCTIONS
Hold  rosuvastatin, and advair while on medicine    Isolate 5 days, then wear a mask an additional 5 days    COVID booster in 2 months recommended

## 2022-11-14 NOTE — PROGRESS NOTES
Cristela is a 82 year old who is being evaluated via a billable telephone visit.      What phone number would you like to be contacted at? [phone]  How would you like to obtain your AVS? [facility]    Assessment & Plan   Problem List Items Addressed This Visit     Infection due to 2019 novel coronavirus     Discussed isolation masking vaccinations treatment options.  She will have to hold her Advair and rosuvastatin.  Delay COVID booster 2 months         Relevant Medications    nirmatrelvir and ritonavir (PAXLOVID) therapy pack    Nonrheumatic aortic valve stenosis     Progressive.  By echo.  Refer         Relevant Orders    Adult Cardiology Eval  Referral    Adult Cardiology Eval  Referral    Pulmonary hypertension (H) - Primary     By echo.  Refer         Relevant Orders    Adult Cardiology Eval  Referral    Adult Cardiology Eval  Referral                     No follow-ups on file.    Merrill Sanders MD  [facility]    Subjective   Cristela is a 82 year old, presenting for the following health issues:  No chief complaint on file.      HPI       COVID-19 Symptom Review  How many days ago did these symptoms start? 3 days    Are any of the following symptoms significant for you?    New or worsening difficulty breathing? No    Worsening cough? Yes, I am coughing up mucus.    Fever or chills? Yes, the highest temperature was 100.5    Headache: No    Sore throat: YES    Chest pain: No    Diarrhea: No    Body aches? No    What treatments has patient tried? Acetaminophen and ibuprofen   Does patient live in a nursing home, group home, or shelter? No  Does patient have a way to get food/medications during quarantined? Yes, I have a friend or family member who can help me.                  Review of Systems   As described      Objective           Vitals:  No vitals were obtained today due to virtual visit.    Physical Exam   healthy, alert and no distress  PSYCH:  Alert and oriented times 3; coherent speech, normal   rate and volume, able to articulate logical thoughts, able   to abstract reason, no tangential thoughts, no hallucinations   or delusions  Her affect is normal  RESP: No cough, no audible wheezing, able to talk in full sentences  Remainder of exam unable to be completed due to telephone visits                Phone call duration: 11 minutes    Merrill Sanders MD

## 2022-11-14 NOTE — TELEPHONE ENCOUNTER
MD out of office today. Pt has virtual appt scheduled this afternoon with other provider for covid management. No other recs

## 2022-11-29 ENCOUNTER — OFFICE VISIT (OUTPATIENT)
Dept: CARDIOLOGY | Facility: CLINIC | Age: 82
End: 2022-11-29
Attending: FAMILY MEDICINE
Payer: COMMERCIAL

## 2022-11-29 VITALS
HEIGHT: 63 IN | HEART RATE: 89 BPM | DIASTOLIC BLOOD PRESSURE: 66 MMHG | WEIGHT: 125.4 LBS | SYSTOLIC BLOOD PRESSURE: 139 MMHG | BODY MASS INDEX: 22.22 KG/M2

## 2022-11-29 DIAGNOSIS — I35.0 NONRHEUMATIC AORTIC VALVE STENOSIS: ICD-10-CM

## 2022-11-29 DIAGNOSIS — R06.02 SOB (SHORTNESS OF BREATH): Primary | ICD-10-CM

## 2022-11-29 DIAGNOSIS — E11.22 TYPE 2 DIABETES MELLITUS WITH STAGE 3A CHRONIC KIDNEY DISEASE, WITHOUT LONG-TERM CURRENT USE OF INSULIN (H): ICD-10-CM

## 2022-11-29 DIAGNOSIS — I27.20 PULMONARY HYPERTENSION (H): ICD-10-CM

## 2022-11-29 DIAGNOSIS — I10 ESSENTIAL HYPERTENSION: ICD-10-CM

## 2022-11-29 DIAGNOSIS — N18.31 STAGE 3A CHRONIC KIDNEY DISEASE (H): ICD-10-CM

## 2022-11-29 DIAGNOSIS — N18.31 TYPE 2 DIABETES MELLITUS WITH STAGE 3A CHRONIC KIDNEY DISEASE, WITHOUT LONG-TERM CURRENT USE OF INSULIN (H): ICD-10-CM

## 2022-11-29 PROCEDURE — 99205 OFFICE O/P NEW HI 60 MIN: CPT | Performed by: INTERNAL MEDICINE

## 2022-11-29 PROCEDURE — 93000 ELECTROCARDIOGRAM COMPLETE: CPT | Performed by: INTERNAL MEDICINE

## 2022-11-29 NOTE — LETTER
11/29/2022    Jose De Jesus Snow MD  [address] [facility] [address]    RE: Cristela AZEVEDO Naomi       Dear Colleague,     I had the pleasure of seeing Cristela Salgado in the [facility].  HPI and Plan:   Cristela is a pleasant 82-year-old female with past medical history of mild diffuse plaque on CT coronary angiography in 2007, hypertension, hyperlipidemia, type 2 diabetes, diet controlled, pulmonary hypertension who is here to reestablish care.  She also has history of nonrheumatic aortic valve stenosis.  She had an echocardiogram in September which I personally reviewed.  It showed moderate aortic stenosis with a mean renal 22 mm.  Ejection fraction was normal.  Moderate diastolic dysfunction was noted.  Moderate pulmonary hypertension was noted with RVSP on my review measured at 48+ right atrial wedge pressure.  In 2021, the aortic stenosis was mild and RV SP was 40+ right atrial pressure.    For the last 6 months, patient has noticed some exertional fatigue and shortness of breath.  Her fatigue is worsened after her COVID infection about 3 weeks ago.  She took back Slo-Bid for her COVID infection.    Due to worsening of aortic stenosis, she made this appointment.  She denies any chest pain.  No orthopnea or PND.  She has occasional leg edema at the end of the day but not in the morning when she is waking up.  She is on diltiazem for her hypertension along with losartan and labetalol.    Patient has had no known lung disease except for asthma.  She has not a smoker.  Not exposed to any secondhand smoke.    She is on rosuvastatin for hyperlipidemia last LDL was 78.  Last A1c was 6.7.  Last GFR was 55.    Exam, thin frail.  Regular rate and rhythm.  On exam 2 Vesagex systolic murmur in the aortic area.   P2 component second heart sound is loud.     EKG done today was reviewed and revealed sinus rhythm without any ischemic changes    Impression  1.  Nonrheumatic moderate aortic valve stenosis  Mean  gradient is 22.  At this time, we would just continue observation and repeat echocardiogram in a year.  We discussed indications for surgery including the parameters of severe aortic stenosis.    2.  Pulmonary hypertension, moderate  Etiology is unclear.  This may explain her shortness of breath.  I would evaluate for causes of pulmonary hypertension.  We will start with PFTs with DLCO, VQ scan to rule out chronic pulmonary thromboembolism.      3.  Shortness of breath, could be related to pulm hypertension but will need to rule out ischemia.  Therefore recommend a stress MRI.    4.  Hypertension, continue multiple antihypertensives    5.  Type 2 diabetes, diet controlled    I will have the patient complete this test and then follow-up with my midlevel provider.  If no cause of pulm hypertension are noted, it could be related to longstanding hypertension and diastolic dysfunction.  We will consider repeating echocardiogram in a year from the last 1 and see me in follow-up at that time.  If shortness of breath gets worse or there is evidence of right heart failure, may need right heart cath.    Thank you for allowing us to personally care of his nice patient with multiple complex issues.    Sincerely,    Pilo Lara MD        Today's clinic visit entailed:  Review of the result(s) of each unique test - echo, lipid profile, A1C  The following tests were independently interpreted by me as noted in my documentation: echo  Ordering of each unique test    Provider  Link to [facility] Help Grid     The level of medical decision making during this visit was of high complexity.      Orders Placed This Encounter   Procedures     V/Q Scan     Follow-Up with Cardiology MADY     EKG 12-lead complete w/read - Clinics (performed today)     General PFT Lab (Please always keep checked)     Pulmonary Function Test       No orders of the defined types were placed in this encounter.      There are no discontinued medications.      Encounter  Diagnoses   Name Primary?     Pulmonary hypertension (H)      Nonrheumatic aortic valve stenosis      SOB (shortness of breath) Yes     Stage 3a chronic kidney disease (H)      Essential hypertension      Type 2 diabetes mellitus with stage 3a chronic kidney disease, without long-term current use of insulin (H)        CURRENT MEDICATIONS:  Current Outpatient Medications   Medication Sig Dispense Refill     albuterol (PROAIR HFA/PROVENTIL HFA/VENTOLIN HFA) 108 (90 Base) MCG/ACT inhaler Inhale 2 puffs into the lungs every 6 hours as needed for shortness of breath / dyspnea or wheezing 18 g 11     aspirin 81 MG tablet Take 1 tablet by mouth daily.       diltiazem ER COATED BEADS (CARDIZEM CD) 360 MG 24 hr capsule TAKE 1 CAPSULE BY MOUTH EVERY DAY 90 capsule 1     ezetimibe (ZETIA) 10 MG tablet Take 1 tablet (10 mg) by mouth daily 30 tablet 11     fluticasone-salmeterol (ADVAIR DISKUS) 500-50 MCG/DOSE inhaler INHALE 1 PUFF INTO THE LUNGS EVERY 12 HOURS 3 Inhaler 3     hydrALAZINE (APRESOLINE) 50 MG tablet Take 1 tablet (50 mg) by mouth 3 times daily 270 tablet 3     labetalol (NORMODYNE) 100 MG tablet TAKE 1 TABLET BY MOUTH TWICE A  tablet 2     losartan (COZAAR) 100 MG tablet TAKE 1 TABLET BY MOUTH EVERY DAY 90 tablet 2     omeprazole (PRILOSEC) 40 MG DR capsule TAKE 1 CAPSULE BY MOUTH EVERY DAY 90 capsule 2     rosuvastatin (CRESTOR) 40 MG tablet TAKE 1 TABLET BY MOUTH EVERY DAY 90 tablet 2     tiZANidine (ZANAFLEX) 2 MG tablet TAKE 1 TABLET (2 MG) BY MOUTH 3 TIMES DAILY AS NEEDED FOR MUSCLE SPASMS 90 tablet 1     vitamin D3 (CHOLECALCIFEROL) 50 mcg (2000 units) tablet Take 1 tablet (50 mcg) by mouth daily         ALLERGIES     Allergies   Allergen Reactions     Lisinopril Cough     Hydrochlorothiazide      Renal insufficiency     Iodine Hives     Iodine contrast     Metformin      Loose stools     Niacin      LEG CRAMPS     Norvasc [Amlodipine Besylate] Swelling     Simvastatin      ACHINESS         PAST  MEDICAL HISTORY:  Past Medical History:   Diagnosis Date     CAD (coronary artery disease)      mild on CT angio     CKD (chronic kidney disease) stage 3, GFR 30-59 ml/min (H)      Colon polyps 2009     DDD (degenerative disc disease), lumbosacral 7/8/2022     Esophageal reflux 4/30/2008     Family history of ischemic heart disease      Fatigue 4/11/2013     Hiatal hernia 2009     History of blood transfusion 4/08    after hiatal hernia surgery     Hyperlipidemia LDL goal <70      Infection due to 2019 novel coronavirus 11/14/2022     Macular degeneration 1/3/2012     Mild persistent asthma      Nonrheumatic aortic valve stenosis 11/14/2022     Pulmonary hypertension (H) 8/20/2012    mild     Scoliosis     mild      Type 2 diabetes mellitus with diabetic chronic kidney disease  (goal A1C<8) 10/20/2015     Ulcer of the stomach and intestine 2009    Anthony's ulcer     Unspecified essential hypertension      Vitamin D deficiency 1/3/2012       PAST SURGICAL HISTORY:  Past Surgical History:   Procedure Laterality Date     ABDOMEN SURGERY  4/08    hiatal hernia     BREAST SURGERY  1974    biopsy-begign     CARDIAC SURGERY  1995    angiogram     COLONOSCOPY  2008     HERNIA REPAIR  2008     HYSTERECTOMY, IDRIS  1974    Fibroids     ZZC OPEN STOMACH,REPR ULCER,SUTURE  2009    Hiatal hernia, and Anthony's ulcer       FAMILY HISTORY:  Family History   Problem Relation Age of Onset     Heart Disease Mother         Rheumatic fever     Heart Disease Father         CAD, DM, PVD     C.A.D. Father         1st MI in 80s     Hypertension Sister         3 sisters, all with hypertension     Hypertension Sister      C.A.D. Maternal Uncle         age 54 yrs, sudden cardiac     C.A.D. Maternal Uncle         age 55 yrs       SOCIAL HISTORY:  Social History     Socioeconomic History     Marital status:      Spouse name: None     Number of children: 3     Years of education: None     Highest education level: None   Occupational  "History     Employer: RETIRED   Tobacco Use     Smoking status: Never     Smokeless tobacco: Never   Substance and Sexual Activity     Alcohol use: Yes     Comment: 1-2 drinks week     Drug use: No     Sexual activity: Yes     Partners: Male   Other Topics Concern     Parent/sibling w/ CABG, MI or angioplasty before 65F 55M? No     Caffeine Concern No     Comment: 1-2 cups daily     Sleep Concern No     Comment: nocturia     Special Diet Yes     Comment: low salt, limits sugar     Exercise No     Comment: walking, yardwork, limited due to plantar fascitis       Review of Systems:  Skin:          Eyes:         ENT:         Respiratory:  Negative       Cardiovascular:  Negative;palpitations;chest pain;dizziness;syncope or near-syncope;lightheadedness;cyanosis Positive for;fatigue;edema    Gastroenterology:        Genitourinary:  not assessed      Musculoskeletal:  Positive for back pain    Neurologic:         Psychiatric:         Heme/Lymph/Imm:         Endocrine:           Physical Exam:  Vitals: /66   Pulse 89   Ht 1.588 m (5' 2.5\")   Wt 56.9 kg (125 lb 6.4 oz)   BMI 22.57 kg/m          CC  Merrill Sanders MD  [address] [place] [address]    Thank you for allowing me to participate in the care of your patient.      Sincerely,     Pilo Lara MD     [facility]  "

## 2022-11-29 NOTE — PROGRESS NOTES
HPI and Plan:   Cristela is a pleasant 82-year-old female with past medical history of mild diffuse plaque on CT coronary angiography in 2007, hypertension, hyperlipidemia, type 2 diabetes, diet controlled, pulmonary hypertension who is here to reestablish care.  She also has history of nonrheumatic aortic valve stenosis.  She had an echocardiogram in September which I personally reviewed.  It showed moderate aortic stenosis with a mean renal 22 mm.  Ejection fraction was normal.  Moderate diastolic dysfunction was noted.  Moderate pulmonary hypertension was noted with RVSP on my review measured at 48+ right atrial wedge pressure.  In 2021, the aortic stenosis was mild and RV SP was 40+ right atrial pressure.    For the last 6 months, patient has noticed some exertional fatigue and shortness of breath.  Her fatigue is worsened after her COVID infection about 3 weeks ago.  She took back Slo-Bid for her COVID infection.    Due to worsening of aortic stenosis, she made this appointment.  She denies any chest pain.  No orthopnea or PND.  She has occasional leg edema at the end of the day but not in the morning when she is waking up.  She is on diltiazem for her hypertension along with losartan and labetalol.    Patient has had no known lung disease except for asthma.  She has not a smoker.  Not exposed to any secondhand smoke.    She is on rosuvastatin for hyperlipidemia last LDL was 78.  Last A1c was 6.7.  Last GFR was 55.    Exam, thin frail.  Regular rate and rhythm.  On exam 2 Vesagex systolic murmur in the aortic area.   P2 component second heart sound is loud.     EKG done today was reviewed and revealed sinus rhythm without any ischemic changes    Impression  1.  Nonrheumatic moderate aortic valve stenosis  Mean gradient is 22.  At this time, we would just continue observation and repeat echocardiogram in a year.  We discussed indications for surgery including the parameters of severe aortic stenosis.    2.   Pulmonary hypertension, moderate  Etiology is unclear.  This may explain her shortness of breath.  I would evaluate for causes of pulmonary hypertension.  We will start with PFTs with DLCO, VQ scan to rule out chronic pulmonary thromboembolism.      3.  Shortness of breath, could be related to pulm hypertension but will need to rule out ischemia.  Therefore recommend a stress MRI.    4.  Hypertension, continue multiple antihypertensives    5.  Type 2 diabetes, diet controlled    I will have the patient complete this test and then follow-up with my midlevel provider.  If no cause of pulm hypertension are noted, it could be related to longstanding hypertension and diastolic dysfunction.  We will consider repeating echocardiogram in a year from the last 1 and see me in follow-up at that time.  If shortness of breath gets worse or there is evidence of right heart failure, may need right heart cath.    Thank you for allowing us to personally care of his nice patient with multiple complex issues.    Sincerely,    Pilo Lara MD        Today's clinic visit entailed:  Review of the result(s) of each unique test - echo, lipid profile, A1C  The following tests were independently interpreted by me as noted in my documentation: echo  Ordering of each unique test    Provider  Link to MDM Help Grid     The level of medical decision making during this visit was of high complexity.      Orders Placed This Encounter   Procedures     V/Q Scan     Follow-Up with Cardiology MADY     EKG 12-lead complete w/read - Clinics (performed today)     General PFT Lab (Please always keep checked)     Pulmonary Function Test       No orders of the defined types were placed in this encounter.      There are no discontinued medications.      Encounter Diagnoses   Name Primary?     Pulmonary hypertension (H)      Nonrheumatic aortic valve stenosis      SOB (shortness of breath) Yes     Stage 3a chronic kidney disease (H)      Essential hypertension       Type 2 diabetes mellitus with stage 3a chronic kidney disease, without long-term current use of insulin (H)        CURRENT MEDICATIONS:  Current Outpatient Medications   Medication Sig Dispense Refill     albuterol (PROAIR HFA/PROVENTIL HFA/VENTOLIN HFA) 108 (90 Base) MCG/ACT inhaler Inhale 2 puffs into the lungs every 6 hours as needed for shortness of breath / dyspnea or wheezing 18 g 11     aspirin 81 MG tablet Take 1 tablet by mouth daily.       diltiazem ER COATED BEADS (CARDIZEM CD) 360 MG 24 hr capsule TAKE 1 CAPSULE BY MOUTH EVERY DAY 90 capsule 1     ezetimibe (ZETIA) 10 MG tablet Take 1 tablet (10 mg) by mouth daily 30 tablet 11     fluticasone-salmeterol (ADVAIR DISKUS) 500-50 MCG/DOSE inhaler INHALE 1 PUFF INTO THE LUNGS EVERY 12 HOURS 3 Inhaler 3     hydrALAZINE (APRESOLINE) 50 MG tablet Take 1 tablet (50 mg) by mouth 3 times daily 270 tablet 3     labetalol (NORMODYNE) 100 MG tablet TAKE 1 TABLET BY MOUTH TWICE A  tablet 2     losartan (COZAAR) 100 MG tablet TAKE 1 TABLET BY MOUTH EVERY DAY 90 tablet 2     omeprazole (PRILOSEC) 40 MG DR capsule TAKE 1 CAPSULE BY MOUTH EVERY DAY 90 capsule 2     rosuvastatin (CRESTOR) 40 MG tablet TAKE 1 TABLET BY MOUTH EVERY DAY 90 tablet 2     tiZANidine (ZANAFLEX) 2 MG tablet TAKE 1 TABLET (2 MG) BY MOUTH 3 TIMES DAILY AS NEEDED FOR MUSCLE SPASMS 90 tablet 1     vitamin D3 (CHOLECALCIFEROL) 50 mcg (2000 units) tablet Take 1 tablet (50 mcg) by mouth daily         ALLERGIES     Allergies   Allergen Reactions     Lisinopril Cough     Hydrochlorothiazide      Renal insufficiency     Iodine Hives     Iodine contrast     Metformin      Loose stools     Niacin      LEG CRAMPS     Norvasc [Amlodipine Besylate] Swelling     Simvastatin      ACHINESS         PAST MEDICAL HISTORY:  Past Medical History:   Diagnosis Date     CAD (coronary artery disease)      mild on CT angio     CKD (chronic kidney disease) stage 3, GFR 30-59 ml/min (H)      Colon polyps 2009     DDD  (degenerative disc disease), lumbosacral 7/8/2022     Esophageal reflux 4/30/2008     Family history of ischemic heart disease      Fatigue 4/11/2013     Hiatal hernia 2009     History of blood transfusion 4/08    after hiatal hernia surgery     Hyperlipidemia LDL goal <70      Infection due to 2019 novel coronavirus 11/14/2022     Macular degeneration 1/3/2012     Mild persistent asthma      Nonrheumatic aortic valve stenosis 11/14/2022     Pulmonary hypertension (H) 8/20/2012    mild     Scoliosis     mild      Type 2 diabetes mellitus with diabetic chronic kidney disease  (goal A1C<8) 10/20/2015     Ulcer of the stomach and intestine 2009    Anthony's ulcer     Unspecified essential hypertension      Vitamin D deficiency 1/3/2012       PAST SURGICAL HISTORY:  Past Surgical History:   Procedure Laterality Date     ABDOMEN SURGERY  4/08    hiatal hernia     BREAST SURGERY  1974    biopsy-begign     CARDIAC SURGERY  1995    angiogram     COLONOSCOPY  2008     HERNIA REPAIR  2008     HYSTERECTOMY, IDRIS  1974    Fibroids     ZZC OPEN STOMACH,REPR ULCER,SUTURE  2009    Hiatal hernia, and Anthony's ulcer       FAMILY HISTORY:  Family History   Problem Relation Age of Onset     Heart Disease Mother         Rheumatic fever     Heart Disease Father         CAD, DM, PVD     C.A.D. Father         1st MI in 80s     Hypertension Sister         3 sisters, all with hypertension     Hypertension Sister      C.A.D. Maternal Uncle         age 54 yrs, sudden cardiac     C.A.D. Maternal Uncle         age 55 yrs       SOCIAL HISTORY:  Social History     Socioeconomic History     Marital status:      Spouse name: None     Number of children: 3     Years of education: None     Highest education level: None   Occupational History     Employer: RETIRED   Tobacco Use     Smoking status: Never     Smokeless tobacco: Never   Substance and Sexual Activity     Alcohol use: Yes     Comment: 1-2 drinks week     Drug use: No     Sexual  "activity: Yes     Partners: Male   Other Topics Concern     Parent/sibling w/ CABG, MI or angioplasty before 65F 55M? No     Caffeine Concern No     Comment: 1-2 cups daily     Sleep Concern No     Comment: nocturia     Special Diet Yes     Comment: low salt, limits sugar     Exercise No     Comment: walking, yardwork, limited due to plantar fascitis       Review of Systems:  Skin:          Eyes:         ENT:         Respiratory:  Negative       Cardiovascular:  Negative;palpitations;chest pain;dizziness;syncope or near-syncope;lightheadedness;cyanosis Positive for;fatigue;edema    Gastroenterology:        Genitourinary:  not assessed      Musculoskeletal:  Positive for back pain    Neurologic:         Psychiatric:         Heme/Lymph/Imm:         Endocrine:           Physical Exam:  Vitals: /66   Pulse 89   Ht 1.588 m (5' 2.5\")   Wt 56.9 kg (125 lb 6.4 oz)   BMI 22.57 kg/m          CC  Merrill Sanders MD  [address] [place] [address]              "

## 2022-12-10 DIAGNOSIS — I10 ESSENTIAL HYPERTENSION: ICD-10-CM

## 2022-12-11 RX ORDER — DILTIAZEM HYDROCHLORIDE 360 MG/1
CAPSULE, EXTENDED RELEASE ORAL
Qty: 90 CAPSULE | Refills: 3 | Status: SHIPPED | OUTPATIENT
Start: 2022-12-11 | End: 2024-03-06

## 2023-01-12 ENCOUNTER — HOSPITAL ENCOUNTER (OUTPATIENT)
Dept: GENERAL RADIOLOGY | Facility: CLINIC | Age: 83
Discharge: HOME | End: 2023-01-12
Attending: INTERNAL MEDICINE
Payer: COMMERCIAL

## 2023-01-12 ENCOUNTER — HOSPITAL ENCOUNTER (OUTPATIENT)
Dept: NUCLEAR MEDICINE | Facility: CLINIC | Age: 83
Setting detail: NUCLEAR MEDICINE
Discharge: HOME | End: 2023-01-12
Attending: INTERNAL MEDICINE
Payer: COMMERCIAL

## 2023-01-12 ENCOUNTER — HOSPITAL ENCOUNTER (OUTPATIENT)
Dept: CARDIOLOGY | Facility: CLINIC | Age: 83
Discharge: HOME | End: 2023-01-12
Attending: INTERNAL MEDICINE
Payer: COMMERCIAL

## 2023-01-12 VITALS — HEART RATE: 68 BPM | OXYGEN SATURATION: 98 % | SYSTOLIC BLOOD PRESSURE: 129 MMHG | DIASTOLIC BLOOD PRESSURE: 73 MMHG

## 2023-01-12 DIAGNOSIS — R06.02 SOB (SHORTNESS OF BREATH): ICD-10-CM

## 2023-01-12 DIAGNOSIS — I27.20 PULMONARY HYPERTENSION (H): ICD-10-CM

## 2023-01-12 PROCEDURE — 250N000011 HC RX IP 250 OP 636: Performed by: INTERNAL MEDICINE

## 2023-01-12 PROCEDURE — 71046 X-RAY EXAM CHEST 2 VIEWS: CPT

## 2023-01-12 PROCEDURE — 272N000035 NM LUNG SCAN VENTILATION AND PERFUSION

## 2023-01-12 PROCEDURE — 343N000001 HC RX 343: Performed by: INTERNAL MEDICINE

## 2023-01-12 PROCEDURE — 255N000002 HC RX 255 OP 636: Performed by: INTERNAL MEDICINE

## 2023-01-12 PROCEDURE — 75563 CARD MRI W/STRESS IMG & DYE: CPT

## 2023-01-12 PROCEDURE — 93016 CV STRESS TEST SUPVJ ONLY: CPT | Performed by: INTERNAL MEDICINE

## 2023-01-12 PROCEDURE — A9585 GADOBUTROL INJECTION: HCPCS | Performed by: INTERNAL MEDICINE

## 2023-01-12 PROCEDURE — A9567 TECHNETIUM TC-99M AEROSOL: HCPCS | Performed by: INTERNAL MEDICINE

## 2023-01-12 PROCEDURE — A9540 TC99M MAA: HCPCS | Performed by: INTERNAL MEDICINE

## 2023-01-12 PROCEDURE — 75563 CARD MRI W/STRESS IMG & DYE: CPT | Mod: 26 | Performed by: INTERNAL MEDICINE

## 2023-01-12 PROCEDURE — 93018 CV STRESS TEST I&R ONLY: CPT | Performed by: INTERNAL MEDICINE

## 2023-01-12 RX ORDER — DIAZEPAM 5 MG
5 TABLET ORAL EVERY 30 MIN PRN
Status: DISCONTINUED | OUTPATIENT
Start: 2023-01-12 | End: 2023-01-13 | Stop reason: HOSPADM

## 2023-01-12 RX ORDER — ALBUTEROL SULFATE 90 UG/1
2 AEROSOL, METERED RESPIRATORY (INHALATION) EVERY 5 MIN PRN
Status: DISCONTINUED | OUTPATIENT
Start: 2023-01-12 | End: 2023-01-13 | Stop reason: HOSPADM

## 2023-01-12 RX ORDER — DIPHENHYDRAMINE HCL 25 MG
25 CAPSULE ORAL
Status: DISCONTINUED | OUTPATIENT
Start: 2023-01-12 | End: 2023-01-13 | Stop reason: HOSPADM

## 2023-01-12 RX ORDER — REGADENOSON 0.08 MG/ML
0.4 INJECTION, SOLUTION INTRAVENOUS ONCE
Status: COMPLETED | OUTPATIENT
Start: 2023-01-12 | End: 2023-01-12

## 2023-01-12 RX ORDER — CAFFEINE CITRATE 20 MG/ML
60 SOLUTION INTRAVENOUS
Status: DISCONTINUED | OUTPATIENT
Start: 2023-01-12 | End: 2023-01-13 | Stop reason: HOSPADM

## 2023-01-12 RX ORDER — ONDANSETRON 2 MG/ML
4 INJECTION INTRAMUSCULAR; INTRAVENOUS
Status: DISCONTINUED | OUTPATIENT
Start: 2023-01-12 | End: 2023-01-13 | Stop reason: HOSPADM

## 2023-01-12 RX ORDER — AMINOPHYLLINE 25 MG/ML
100 INJECTION, SOLUTION INTRAVENOUS ONCE
Status: COMPLETED | OUTPATIENT
Start: 2023-01-12 | End: 2023-01-12

## 2023-01-12 RX ORDER — DIPHENHYDRAMINE HYDROCHLORIDE 50 MG/ML
25-50 INJECTION INTRAMUSCULAR; INTRAVENOUS
Status: DISCONTINUED | OUTPATIENT
Start: 2023-01-12 | End: 2023-01-13 | Stop reason: HOSPADM

## 2023-01-12 RX ORDER — METHYLPREDNISOLONE SODIUM SUCCINATE 125 MG/2ML
125 INJECTION, POWDER, LYOPHILIZED, FOR SOLUTION INTRAMUSCULAR; INTRAVENOUS
Status: DISCONTINUED | OUTPATIENT
Start: 2023-01-12 | End: 2023-01-13 | Stop reason: HOSPADM

## 2023-01-12 RX ORDER — ACYCLOVIR 200 MG/1
0-1 CAPSULE ORAL
Status: DISCONTINUED | OUTPATIENT
Start: 2023-01-12 | End: 2023-01-13 | Stop reason: HOSPADM

## 2023-01-12 RX ORDER — GADOBUTROL 604.72 MG/ML
15 INJECTION INTRAVENOUS ONCE
Status: COMPLETED | OUTPATIENT
Start: 2023-01-12 | End: 2023-01-12

## 2023-01-12 RX ADMIN — KIT FOR THE PREPARATION OF TECHNETIUM TC 99M ALBUMIN AGGREGATED 6.5 MILLICURIE: 2.5 INJECTION, POWDER, FOR SOLUTION INTRAVENOUS at 15:04

## 2023-01-12 RX ADMIN — GADOBUTROL 15 ML: 604.72 INJECTION INTRAVENOUS at 13:24

## 2023-01-12 RX ADMIN — KIT FOR THE PREPARATION OF TECHNETIUM TC 99M PENTETATE 53.5 MILLICURIE: 20 INJECTION, POWDER, LYOPHILIZED, FOR SOLUTION INTRAVENOUS; RESPIRATORY (INHALATION) at 14:45

## 2023-01-12 RX ADMIN — REGADENOSON 0.4 MG: 0.08 INJECTION, SOLUTION INTRAVENOUS at 12:53

## 2023-01-12 RX ADMIN — AMINOPHYLLINE 100 MG: 25 INJECTION, SOLUTION INTRAVENOUS at 13:37

## 2023-02-27 ENCOUNTER — OFFICE VISIT (OUTPATIENT)
Dept: CARDIOLOGY | Facility: CLINIC | Age: 83
End: 2023-02-27
Attending: INTERNAL MEDICINE
Payer: COMMERCIAL

## 2023-02-27 VITALS
HEIGHT: 63 IN | OXYGEN SATURATION: 99 % | HEART RATE: 70 BPM | BODY MASS INDEX: 22.38 KG/M2 | WEIGHT: 126.3 LBS | SYSTOLIC BLOOD PRESSURE: 146 MMHG | DIASTOLIC BLOOD PRESSURE: 70 MMHG

## 2023-02-27 DIAGNOSIS — E11.22 TYPE 2 DIABETES MELLITUS WITH STAGE 3A CHRONIC KIDNEY DISEASE, WITHOUT LONG-TERM CURRENT USE OF INSULIN (H): Primary | ICD-10-CM

## 2023-02-27 DIAGNOSIS — I35.0 NONRHEUMATIC AORTIC VALVE STENOSIS: ICD-10-CM

## 2023-02-27 DIAGNOSIS — R06.02 SOB (SHORTNESS OF BREATH): ICD-10-CM

## 2023-02-27 DIAGNOSIS — I10 ESSENTIAL HYPERTENSION: ICD-10-CM

## 2023-02-27 DIAGNOSIS — I27.20 PULMONARY HYPERTENSION (H): ICD-10-CM

## 2023-02-27 DIAGNOSIS — N18.31 TYPE 2 DIABETES MELLITUS WITH STAGE 3A CHRONIC KIDNEY DISEASE, WITHOUT LONG-TERM CURRENT USE OF INSULIN (H): Primary | ICD-10-CM

## 2023-02-27 PROCEDURE — 99214 OFFICE O/P EST MOD 30 MIN: CPT | Performed by: NURSE PRACTITIONER

## 2023-02-27 RX ORDER — BLOOD PRESSURE TEST KIT
1 KIT MISCELLANEOUS ONCE
Qty: 1 KIT | Refills: 0 | Status: SHIPPED | OUTPATIENT
Start: 2023-02-27 | End: 2023-02-27

## 2023-02-27 NOTE — PROGRESS NOTES
~Cardiology Clinic Visit~    Primary Cardiologist: Dr. Lara  Last Office Visit: 11/29/2022  Reason for visit: Testing review follow up    History of Present Illness    Cristela is a pleasant 82-year-old female with past medical history of mild diffuse plaque on CT coronary angiography in 2007, hypertension, hyperlipidemia, type 2 diabetes, diet controlled, pulmonary hypertension, nonrheumatic aortic valve stenosis.      A prior echocardiogram in September 2022 showed moderate aortic stenosis with MG 22 mmHg and a normal ejection fraction.  Noted was moderate diastolic dysfunction and moderate pulmonary hypertension with RVSP at 48+ RAP.  On a prior study in 2021, the aortic stenosis was mild and RVSP was 40+ RAP.    She recently presented to cardiology clinic to reestablish care after previously doctoring with Dr. Marie.  She now follows with Dr. Lara.  For the past 6 months, patient has noticed some exertional fatigue and shortness of breath, notably, she mentioned that her fatigue was worse after recovering from COVID infection.    Due to worsening of aortic stenosis, exertional fatigue and shortness of breath, she sought cardiology care.  At the time of establishing care, she had occasional leg edema, but she denied CP, orthopnea, PND.  Patient has had no known lung disease except for asthma.  She has not a smoker.  Not exposed to any secondhand smoke.    Given the symptoms, patient was recommended to complete PFTs with DLCO, VQ scan to rule out chronic pulmonary thromboembolism, and cardiac MRI with contrast and stress.      Cardiac MRI 1/12/23 negative for ischemia on stress perfusion imaging.  There is normal BiV size and systolic function with LVEF 79%, RVEF 68%.  Delayed hyperenhancement showed none CAD, small scar in the inferior septum at the RV attachment that can be seen in patients with pulmonary hypertension.    VQ scan negative for PE.    PFTs incomplete at the time of our visit  today.    Interval 02/27/23     She mentions that she continues to have some shortness of breath just when walking, but, overall, her breathing over the past few months has been great.  Unfortunately, she was recently was diagnosed with spinal stenosis, which has limited her ability to be active.  Denies CP, LH or dizziness, shortness of breath at rest, progressive dyspnea, palpitations.  We reviewed all the results of her completed testing today; discussed the findings.    Impression    Nonrheumatic moderate aortic valve stenosis  -Mean gradient is 22  -Continue observation and repeat echocardiogram in a year.    -Discussed indications for surgery including the parameters/symptoms of severe aortic stenosis.    Shortness of breath  Pulmonary hypertension, moderate  -Etiology of SOB is unclear --> differentials include pulmonary hypertension (cause unknown, though could be related to longstanding hypertension and diastolic dysfunction), ischemia or anginal equivalent, underlying lung disease.  -Cardiac stress MRI negative for inducible ischemia.  -PFTs with DLCO pending.  -VQ scan negative for chronic pulmonary thromboembolism.    -Consider right heart catheterization if SOB worsens or signs of right heart failure.     Hypertension, continue multiple antihypertensives  Type 2 diabetes, diet controlled  __________________________________________________________________    Plan:  1. Complete PFTs as ordered.  2. Continue to monitor blood pressure at home, and keep a log.  3. Follow-up in 3 months after additional testing completed.  We will review these results and repeat blood pressure check at subsequent visit.  __________________________________________________________________    Thank you for the opportunity to participate in this pleasant patient's care.    We would be happy to see this patient sooner for any concerns in the meantime.    JESSE Quinones, CNP   Nurse Practitioner  [facility]  Care    Today's clinic visit entailed:  I personally reviewed the results of the additional testing: Cardiac MRI with stress, chest x-ray imaging, VQ scan.  Prescription drug management  The level of medical decision making during this visit was of moderate complexity.    Orders this Visit:  Orders Placed This Encounter   Procedures     Follow-Up with Cardiology- MADY     Orders Placed This Encounter   Medications     Blood Pressure KIT     Si kit once for 1 dose     Dispense:  1 kit     Refill:  0     There are no discontinued medications.  Encounter Diagnoses   Name Primary?     Pulmonary hypertension (H)      SOB (shortness of breath)      CURRENT MEDICATIONS:  Current Outpatient Medications   Medication Sig Dispense Refill     albuterol (PROAIR HFA/PROVENTIL HFA/VENTOLIN HFA) 108 (90 Base) MCG/ACT inhaler Inhale 2 puffs into the lungs every 6 hours as needed for shortness of breath / dyspnea or wheezing 18 g 11     aspirin 81 MG tablet Take 1 tablet by mouth daily.       diltiazem ER COATED BEADS (CARDIZEM CD) 360 MG 24 hr capsule TAKE 1 CAPSULE BY MOUTH EVERY DAY 90 capsule 3     fluticasone-salmeterol (ADVAIR DISKUS) 500-50 MCG/DOSE inhaler INHALE 1 PUFF INTO THE LUNGS EVERY 12 HOURS 3 Inhaler 3     hydrALAZINE (APRESOLINE) 50 MG tablet Take 1 tablet (50 mg) by mouth 3 times daily 270 tablet 3     labetalol (NORMODYNE) 100 MG tablet TAKE 1 TABLET BY MOUTH TWICE A  tablet 2     losartan (COZAAR) 100 MG tablet TAKE 1 TABLET BY MOUTH EVERY DAY 90 tablet 2     omeprazole (PRILOSEC) 40 MG DR capsule TAKE 1 CAPSULE BY MOUTH EVERY DAY 90 capsule 2     rosuvastatin (CRESTOR) 40 MG tablet TAKE 1 TABLET BY MOUTH EVERY DAY 90 tablet 2     vitamin D3 (CHOLECALCIFEROL) 50 mcg (2000 units) tablet Take 1 tablet (50 mcg) by mouth daily       ezetimibe (ZETIA) 10 MG tablet Take 1 tablet (10 mg) by mouth daily (Patient not taking: Reported on 2023) 30 tablet 11     tiZANidine (ZANAFLEX) 2 MG tablet TAKE 1 TABLET  "(2 MG) BY MOUTH 3 TIMES DAILY AS NEEDED FOR MUSCLE SPASMS (Patient not taking: Reported on 2/27/2023) 90 tablet 1     ALLERGIES     Allergies   Allergen Reactions     Lisinopril Cough     Hydrochlorothiazide      Renal insufficiency     Iodine Hives     Iodine contrast     Metformin      Loose stools     Niacin      LEG CRAMPS     Norvasc [Amlodipine Besylate] Swelling     Simvastatin      ACHINESS       PAST MEDICAL, SURGICAL, FAMILY, SOCIAL HISTORY:  History was reviewed and updated as needed, see medical record.    Review of Systems:  Review Of Systems  Skin: negative  Eyes: negative  Ears/Nose/Throat: negative  Respiratory: Shortness of breath- stable and Dyspnea on exertion- stable  Cardiovascular: negative  Gastrointestinal: negative  Genitourinary: negative  Musculoskeletal: negative  Neurologic: negative  Psychiatric: negative  Hematologic/Lymphatic/Immunologic: negative  Endocrine: negative     Physical Exam:    Vitals: BP (!) 146/70 (BP Location: Left arm, Patient Position: Sitting)   Pulse 70   Ht 1.588 m (5' 2.5\")   Wt 57.3 kg (126 lb 4.8 oz)   SpO2 99%   BMI 22.73 kg/m    Constitutional: Appears stated age, well nourished, NAD.  Eyes: Pupils equal, round. Sclerae anicteric.   HEENT: Normocephalic, atraumatic.   Neck: Supple. Carotid pulses full and equal. No carotid bruit.  No JVD appreciated.  Respiratory: Non-labored. Lungs CTAB.  Cardiovascular: RRR, normal S1 and S2, II/VI systolic murmur at apex, No edema.  GI: Soft, non-distended, non-tender.  Skin: Warm and dry. No rashes, cyanosis, edema.  Musculoskeletal/Extremities: Symmetrical movement to all extremities.  Neurologic: No gross focal deficits. Alert, awake, and oriented to all spheres.  Psychiatric: Affect appropriate. Mentation normal.    Recent Lab Results:  LIPID RESULTS:  Lab Results   Component Value Date    CHOL 180 09/30/2022    CHOL 182 07/23/2020    HDL 76 09/30/2022    HDL 63 07/23/2020    LDL 78 09/30/2022    LDL 91 07/23/2020 "    TRIG 128 09/30/2022    TRIG 140 07/23/2020    CHOLHDLRATIO 4.0 08/03/2015     LIVER ENZYME RESULTS:  Lab Results   Component Value Date    AST 12 09/30/2022    AST 15 06/08/2021    ALT 18 09/30/2022    ALT 18 06/08/2021     CBC RESULTS:  Lab Results   Component Value Date    WBC 6.1 02/08/2022    WBC 6.6 05/17/2019    RBC 4.25 02/08/2022    RBC 4.43 05/17/2019    HGB 12.1 02/08/2022    HGB 13.0 07/23/2020    HCT 38.1 02/08/2022    HCT 39.4 05/17/2019    MCV 90 02/08/2022    MCV 89 05/17/2019    MCH 28.5 02/08/2022    MCH 30.2 05/17/2019    MCHC 31.8 02/08/2022    MCHC 34.0 05/17/2019    RDW 13.2 02/08/2022    RDW 13.2 05/17/2019     02/08/2022     05/17/2019     BMP RESULTS:  Lab Results   Component Value Date     05/28/2022     06/08/2021    POTASSIUM 4.1 05/28/2022    POTASSIUM 4.1 06/08/2021    CHLORIDE 109 05/28/2022    CHLORIDE 103 06/08/2021    CO2 25 05/28/2022    CO2 26 06/08/2021    ANIONGAP 5 05/28/2022    ANIONGAP 6 06/08/2021     (H) 05/28/2022     (H) 06/08/2021    BUN 13 05/28/2022    BUN 17 06/08/2021    CR 1.03 05/28/2022    CR 1.23 (H) 06/08/2021    GFRESTIMATED 55 (L) 05/28/2022    GFRESTIMATED 42 (L) 06/08/2021    GFRESTBLACK 48 (L) 06/08/2021    JORDAN 8.9 05/28/2022    JORDAN 8.9 06/08/2021      A1C RESULTS:  Lab Results   Component Value Date    A1C 6.7 (H) 05/28/2022    A1C 6.7 (H) 06/08/2021     INR RESULTS:  Lab Results   Component Value Date    INR 0.97 04/16/2009

## 2023-02-27 NOTE — PATIENT INSTRUCTIONS
Thank you for your visit with the [facility] today.    Today's Summary     Continue current plan of care, no changes today.  Continue to work with your Orthopedic doctor for management of pain from spinal stenosis.  Monitor your blood pressure at home and keep a log so we can review at your next visit.  Complete the lung testing, we can review these results at follow up.    Follow up plan: 3-month visit.    If you have questions or concerns, please do not hesitate to call my nursing support team at [phone].    Scheduling phone number: [phone]  [facility] Clinic Number: [phone]    It was a pleasure seeing you today.     JESSE Quinones, CNP  Nurse Practitioner  [facility]  February 27, 2023  ________________________________________________________

## 2023-03-12 DIAGNOSIS — E78.5 HYPERLIPIDEMIA LDL GOAL <70: ICD-10-CM

## 2023-03-12 RX ORDER — ROSUVASTATIN CALCIUM 40 MG/1
TABLET, COATED ORAL
Qty: 90 TABLET | Refills: 3 | Status: SHIPPED | OUTPATIENT
Start: 2023-03-12 | End: 2024-03-06

## 2023-03-16 ENCOUNTER — OFFICE VISIT (OUTPATIENT)
Dept: INTERNAL MEDICINE | Facility: CLINIC | Age: 83
End: 2023-03-16
Payer: COMMERCIAL

## 2023-03-16 VITALS
OXYGEN SATURATION: 95 % | DIASTOLIC BLOOD PRESSURE: 62 MMHG | HEIGHT: 63 IN | WEIGHT: 124.8 LBS | SYSTOLIC BLOOD PRESSURE: 118 MMHG | HEART RATE: 81 BPM | RESPIRATION RATE: 15 BRPM | TEMPERATURE: 97.4 F | BODY MASS INDEX: 22.11 KG/M2

## 2023-03-16 DIAGNOSIS — M48.07 LUMBOSACRAL SPINAL STENOSIS: ICD-10-CM

## 2023-03-16 DIAGNOSIS — E78.5 HYPERLIPIDEMIA LDL GOAL <70: ICD-10-CM

## 2023-03-16 DIAGNOSIS — I35.0 AORTIC VALVE STENOSIS, ETIOLOGY OF CARDIAC VALVE DISEASE UNSPECIFIED: ICD-10-CM

## 2023-03-16 DIAGNOSIS — N18.31 TYPE 2 DIABETES MELLITUS WITH STAGE 3A CHRONIC KIDNEY DISEASE, WITHOUT LONG-TERM CURRENT USE OF INSULIN (H): ICD-10-CM

## 2023-03-16 DIAGNOSIS — J45.30 MILD PERSISTENT ASTHMA WITHOUT COMPLICATION: ICD-10-CM

## 2023-03-16 DIAGNOSIS — K21.9 GASTROESOPHAGEAL REFLUX DISEASE WITHOUT ESOPHAGITIS: ICD-10-CM

## 2023-03-16 DIAGNOSIS — E11.22 TYPE 2 DIABETES MELLITUS WITH STAGE 3A CHRONIC KIDNEY DISEASE, WITHOUT LONG-TERM CURRENT USE OF INSULIN (H): ICD-10-CM

## 2023-03-16 DIAGNOSIS — N18.31 STAGE 3A CHRONIC KIDNEY DISEASE (H): ICD-10-CM

## 2023-03-16 DIAGNOSIS — I10 ESSENTIAL HYPERTENSION: ICD-10-CM

## 2023-03-16 PROBLEM — U07.1 INFECTION DUE TO 2019 NOVEL CORONAVIRUS: Status: RESOLVED | Noted: 2022-11-14 | Resolved: 2023-03-16

## 2023-03-16 LAB
ANION GAP SERPL CALCULATED.3IONS-SCNC: 11 MMOL/L (ref 7–15)
BUN SERPL-MCNC: 13.9 MG/DL (ref 8–23)
CALCIUM SERPL-MCNC: 9.7 MG/DL (ref 8.8–10.2)
CHLORIDE SERPL-SCNC: 108 MMOL/L (ref 98–107)
CREAT SERPL-MCNC: 1.16 MG/DL (ref 0.51–0.95)
DEPRECATED HCO3 PLAS-SCNC: 23 MMOL/L (ref 22–29)
GFR SERPL CREATININE-BSD FRML MDRD: 47 ML/MIN/1.73M2
GLUCOSE SERPL-MCNC: 161 MG/DL (ref 70–99)
HBA1C MFR BLD: 6.9 % (ref 0–5.6)
POTASSIUM SERPL-SCNC: 4.2 MMOL/L (ref 3.4–5.3)
SODIUM SERPL-SCNC: 142 MMOL/L (ref 136–145)

## 2023-03-16 PROCEDURE — 83036 HEMOGLOBIN GLYCOSYLATED A1C: CPT | Performed by: INTERNAL MEDICINE

## 2023-03-16 PROCEDURE — 36415 COLL VENOUS BLD VENIPUNCTURE: CPT | Performed by: INTERNAL MEDICINE

## 2023-03-16 PROCEDURE — 80048 BASIC METABOLIC PNL TOTAL CA: CPT | Performed by: INTERNAL MEDICINE

## 2023-03-16 PROCEDURE — 99214 OFFICE O/P EST MOD 30 MIN: CPT | Performed by: INTERNAL MEDICINE

## 2023-03-16 RX ORDER — FLUTICASONE PROPIONATE AND SALMETEROL 500; 50 UG/1; UG/1
1 POWDER RESPIRATORY (INHALATION) EVERY 12 HOURS
COMMUNITY
End: 2024-03-06

## 2023-03-16 RX ORDER — EZETIMIBE 10 MG/1
10 TABLET ORAL DAILY
Qty: 90 TABLET | Refills: 3 | Status: SHIPPED | OUTPATIENT
Start: 2023-03-16 | End: 2024-03-06

## 2023-03-16 RX ORDER — OMEPRAZOLE 40 MG/1
40 CAPSULE, DELAYED RELEASE ORAL DAILY
Qty: 90 CAPSULE | Refills: 3 | Status: SHIPPED | OUTPATIENT
Start: 2023-03-16 | End: 2024-03-06

## 2023-03-16 RX ORDER — LOSARTAN POTASSIUM 100 MG/1
100 TABLET ORAL DAILY
Qty: 90 TABLET | Refills: 3 | Status: SHIPPED | OUTPATIENT
Start: 2023-03-16 | End: 2024-03-06

## 2023-03-16 RX ORDER — HYDRALAZINE HYDROCHLORIDE 50 MG/1
50 TABLET, FILM COATED ORAL 3 TIMES DAILY
Qty: 270 TABLET | Refills: 3 | Status: SHIPPED | OUTPATIENT
Start: 2023-03-16 | End: 2024-03-06

## 2023-03-16 ASSESSMENT — ASTHMA QUESTIONNAIRES
QUESTION_2 LAST FOUR WEEKS HOW OFTEN HAVE YOU HAD SHORTNESS OF BREATH: NOT AT ALL
QUESTION_1 LAST FOUR WEEKS HOW MUCH OF THE TIME DID YOUR ASTHMA KEEP YOU FROM GETTING AS MUCH DONE AT WORK, SCHOOL OR AT HOME: NONE OF THE TIME
QUESTION_3 LAST FOUR WEEKS HOW OFTEN DID YOUR ASTHMA SYMPTOMS (WHEEZING, COUGHING, SHORTNESS OF BREATH, CHEST TIGHTNESS OR PAIN) WAKE YOU UP AT NIGHT OR EARLIER THAN USUAL IN THE MORNING: NOT AT ALL
ACT_TOTALSCORE: 24
QUESTION_5 LAST FOUR WEEKS HOW WOULD YOU RATE YOUR ASTHMA CONTROL: WELL CONTROLLED
ACT_TOTALSCORE: 24
QUESTION_4 LAST FOUR WEEKS HOW OFTEN HAVE YOU USED YOUR RESCUE INHALER OR NEBULIZER MEDICATION (SUCH AS ALBUTEROL): NOT AT ALL

## 2023-03-16 NOTE — PATIENT INSTRUCTIONS
Continue current medications  Prescriptions refilled.    Labs today as ordered  Referral to Orthopedics  A representative will call you within 2 business days to help you schedule your appointment, or you may contact the  Representative at: [phone].  Check with insurance or speak with your pharmacist re: Shingrix vaccine coverage for shingles prevention.  This is a 2 shot series done 2-6 months apart  Consider covid booster vaccination. Get at pharmacy   Get Omeprazole through [facility] at [employer] or [employer] for cost savings   Lung function tests in May as scheduled  Cardiac ECHO in September for 1 year follow-up or earlier per cardiology

## 2023-03-16 NOTE — PROGRESS NOTES
ASSESSMENT:   1. Type 2 diabetes mellitus with stage 3a chronic kidney disease, without long-term current use of insulin (H)  Previously controlled.  Not checking blood sugars.  Labs as ordered.  Continue management pending lab results  - Hemoglobin A1c; Future  - Basic metabolic panel; Future  - Hemoglobin A1c  - Basic metabolic panel    2. Stage 3a chronic kidney disease (H)  Previously stable.  Needs lab follow-up  - Basic metabolic panel; Future  - Basic metabolic panel    3. Hyperlipidemia LDL goal <70  Controlled.  Continue current medication  - ezetimibe (ZETIA) 10 MG tablet; Take 1 tablet (10 mg) by mouth daily  Dispense: 90 tablet; Refill: 3    4. Essential hypertension  Controlled.  Continue current medication  - losartan (COZAAR) 100 MG tablet; Take 1 tablet (100 mg) by mouth daily  Dispense: 90 tablet; Refill: 3  - hydrALAZINE (APRESOLINE) 50 MG tablet; Take 1 tablet (50 mg) by mouth 3 times daily  Dispense: 270 tablet; Refill: 3    5. Aortic valve stenosis, etiology of cardiac valve disease unspecified  Moderate aortic stenosis.  Will repeat echo later this year through cardiology    6. Mild persistent asthma without complication   Previous shortness of breath resolved.  Breathing well today.  ACT = 24 currently.  Continue current medication    7. Gastroesophageal reflux disease without esophagitis  Omeprazole with higher cost on patient's insurance.  Given she has breakthrough symptoms occasionally, famotidine will not be a reasonable substitute.  Patient given coupon for good Rx to significantly reduce cost of donepezil and will get through [facility] or [facility] pharmacy with coupon.  Counseled regarding caffeine reduction  - omeprazole (PRILOSEC) 40 MG DR capsule; Take 1 capsule (40 mg) by mouth daily  Dispense: 90 capsule; Refill: 3    8. Lumbosacral spinal stenosis  Chronic low back pain issue.  Denies bowel or bladder incontinence.  No leg weakness.  Patient to follow-up with Ortho as per their  previous recommendation.  Possible injection therapy versus other  - Orthopedic  Referral; Future        PLAN:  Continue current medications  Prescriptions refilled.    Labs today as ordered  Referral to Orthopedics  A representative will call you within 2 business days to help you schedule your appointment, or you may contact the  Representative at: [phone].  Check with insurance or speak with your pharmacist re: Shingrix vaccine coverage for shingles prevention.  This is a 2 shot series done 2-6 months apart  Consider covid booster vaccination. Get at pharmacy   Get Omeprazole through [facility] at [employer] or [employer] for cost savings   Lung function tests in May as scheduled  Cardiac ECHO in September for 1 year follow-up or earlier per cardiology      (Chart documentation was completed, in part, with [email or web address] voice-recognition software. Even though reviewed, some grammatical, spelling, and word errors may remain.)    Jose De Jesus Snow MD  Internal Medicine Department  [facility] Archibald is a 82 year old, presenting for the following health issues:  Recheck Medication      History of Present Illness     Asthma:  She presents for follow up of asthma.  She has no cough, no wheezing, and no shortness of breath. She is using a relief medication a few times a month. She does not miss any doses of her controller medication throughout the week.Patient is aware of the following triggers: pollens. The patient has not had a visit to the Emergency Room, Urgent Care or Hospital due to asthma since the last clinic visit.     Vascular Disease:  She presents for follow up of vascular disease.  She never takes nitroglycerin. She takes daily aspirin.    She eats 0-1 servings of fruits and vegetables daily.She consumes 0 sweetened beverage(s) daily.She exercises with enough effort to increase her heart rate 10 to 19 minutes per day.  She exercises with enough effort to  increase her heart rate 3 or less days per week.   She is taking medications regularly.       Other concerns:  1. Patient requesting to switch from omeprazole to famotidine due to better insurance coverage      Most recent lab results reviewed with pt.      Saw cardiology a few weeks ago because of history shortness of breath that has been improving.  Cardiac MRI negative for ischemia.  No evidence of pulmonary embolism.  Has PFTs ordered but has not completed spirometry study yet.  History mild persistent asthma.  Patient states breathing is much better recently.   On [place] and rare albuterol use.  ACT score  = 24 today  Has spinal stenosis and ongoing back pain with lower extremity radiculopathy.  No bowel or bladder incontinence.  Previously saw Dr. Mathis from  Ortho  History of GERD.  Occasional breakthrough symptoms.  Has 2 cups of coffee per day.  Currently on omeprazole.  That medication is higher in cost now with patient's insurance and asking if famotidine would be an option.  Not checking blood sugars at home  Denies CP, current SOB, abdominal pain, polyuria, polydipsia, vision changes     Component      Latest Ref Rng & Units 6/8/2021 12/21/2021 5/28/2022   Sodium      133 - 144 mmol/L 135 135 139   Potassium      3.4 - 5.3 mmol/L 4.1 4.0 4.1   Chloride      94 - 109 mmol/L 103 105 109   Carbon Dioxide      20 - 32 mmol/L 26 25 25   Anion Gap      3 - 14 mmol/L 6 5 5   Glucose      70 - 99 mg/dL 124 (H) 157 (H) 134 (H)   Urea Nitrogen      7 - 30 mg/dL 17 15 13   Creatinine      0.52 - 1.04 mg/dL 1.23 (H) 1.05 (H) 1.03   GFR Estimate      >60 mL/min/1.73m2 42 (L) 53 (L) 55 (L)   GFR Estimate If Black      >60 mL/min/1.73:m2 48 (L)     Calcium      8.5 - 10.1 mg/dL 8.9 9.5 8.9   Bilirubin Total      0.2 - 1.3 mg/dL 0.6     Albumin      3.4 - 5.0 g/dL 3.8     Protein Total      6.8 - 8.8 g/dL 6.9     Alkaline Phosphatase      40 - 150 U/L 84     ALT      0 - 50 U/L 18     AST      0 - 45 U/L 15    "  Cholesterol      <200 mg/dL  214 (H) 207 (H)   Triglycerides      <150 mg/dL  146 132   HDL Cholesterol      >=50 mg/dL  74 85   LDL Cholesterol Calculated      <=100 mg/dL  111 (H) 96   Non HDL Cholesterol      <130 mg/dL  140 (H) 122   Patient Fasting > 8hrs?        Yes Yes   Creatinine Urine      mg/dL  89    Albumin Urine mg/L      mg/L  16    Albumin Urine mg/g Cr      0.00 - 25.00 mg/g Cr  17.98    Hemoglobin A1C      0.0 - 5.6 % 6.7 (H) 6.8 (H) 6.7 (H)     Component      Latest Ref Rng & Units 9/30/2022   Bilirubin Total      0.2 - 1.3 mg/dL 0.5   Bilirubin Direct      0.0 - 0.2 mg/dL 0.1   Protein Total      6.8 - 8.8 g/dL 6.0 (L)   Albumin      3.4 - 5.0 g/dL 3.5   Alkaline Phosphatase      40 - 150 U/L 82   AST      0 - 45 U/L 12   ALT      0 - 50 U/L 18   Cholesterol      <200 mg/dL 180   Triglycerides      <150 mg/dL 128   HDL Cholesterol      >=50 mg/dL 76   LDL Cholesterol Calculated      <=100 mg/dL 78   Non HDL Cholesterol      <130 mg/dL 104   Patient Fasting > 8hrs?       Yes   CK Total      30 - 225 U/L 40       Additional ROS:   Constitutional, HEENT, Cardiovascular, Pulmonary, GI and , Neuro, MSK and Psych review of systems/symptoms are otherwise negative or unchanged from previous, except as noted above.      OBJECTIVE:  /62   Pulse 81   Temp 97.4  F (36.3  C) (Temporal)   Resp 15   Ht 1.588 m (5' 2.5\")   Wt 56.6 kg (124 lb 12.8 oz)   SpO2 95%   BMI 22.46 kg/m     Estimated body mass index is 22.46 kg/m  as calculated from the following:    Height as of this encounter: 1.588 m (5' 2.5\").    Weight as of this encounter: 56.6 kg (124 lb 12.8 oz).     Neck: no adenopathy. Thyroid normal to palpation. No bruits  Pulm: Lungs clear to auscultation   CV: Regular rates and rhythm. 2/6 JANES RUSB  GI: Soft, nontender, Normal active bowel sounds, No hepatosplenomegaly or masses palpable  Ext: Peripheral pulses intact. No edema.  Neuro: Normal strength and tone, sensory exam grossly " normal  Back: Tenderness to palpation bilateral paralumbar musculature.  Negative straight leg raise test bilaterally

## 2023-04-07 NOTE — LETTER
2/27/2023    Jose De Jesus Snow MD  [address] [facility] [address]    RE: Cristela AZEVEDO Reannalamini       Dear Colleague,     I had the pleasure of seeing Cristela Salgado in the [facility].              ~Cardiology Clinic Visit~    Primary Cardiologist: Dr. Lara  Last Office Visit: 11/29/2022  Reason for visit: Testing review follow up    History of Present Illness    Cristela is a pleasant 82-year-old female with past medical history of mild diffuse plaque on CT coronary angiography in 2007, hypertension, hyperlipidemia, type 2 diabetes, diet controlled, pulmonary hypertension, nonrheumatic aortic valve stenosis.      A prior echocardiogram in September 2022 showed moderate aortic stenosis with MG 22 mmHg and a normal ejection fraction.  Noted was moderate diastolic dysfunction and moderate pulmonary hypertension with RVSP at 48+ RAP.  On a prior study in 2021, the aortic stenosis was mild and RVSP was 40+ RAP.    She recently presented to cardiology clinic to reestablish care after previously doctoring with Dr. Marie.  She now follows with Dr. Lara.  For the past 6 months, patient has noticed some exertional fatigue and shortness of breath, notably, she mentioned that her fatigue was worse after recovering from COVID infection.    Due to worsening of aortic stenosis, exertional fatigue and shortness of breath, she sought cardiology care.  At the time of establishing care, she had occasional leg edema, but she denied CP, orthopnea, PND.  Patient has had no known lung disease except for asthma.  She has not a smoker.  Not exposed to any secondhand smoke.    Given the symptoms, patient was recommended to complete PFTs with DLCO, VQ scan to rule out chronic pulmonary thromboembolism, and cardiac MRI with contrast and stress.      Cardiac MRI 1/12/23 negative for ischemia on stress perfusion imaging.  There is normal BiV size and systolic function with LVEF 79%, RVEF 68%.  Delayed hyperenhancement showed  none CAD, small scar in the inferior septum at the RV attachment that can be seen in patients with pulmonary hypertension.    VQ scan negative for PE.    PFTs incomplete at the time of our visit today.    Interval 02/27/23     She mentions that she continues to have some shortness of breath just when walking, but, overall, her breathing over the past few months has been great.  Unfortunately, she was recently was diagnosed with spinal stenosis, which has limited her ability to be active.  Denies CP, LH or dizziness, shortness of breath at rest, progressive dyspnea, palpitations.  We reviewed all the results of her completed testing today; discussed the findings.    Impression    Nonrheumatic moderate aortic valve stenosis  -Mean gradient is 22  -Continue observation and repeat echocardiogram in a year.    -Discussed indications for surgery including the parameters/symptoms of severe aortic stenosis.    Shortness of breath  Pulmonary hypertension, moderate  -Etiology of SOB is unclear --> differentials include pulmonary hypertension (cause unknown, though could be related to longstanding hypertension and diastolic dysfunction), ischemia or anginal equivalent, underlying lung disease.  -Cardiac stress MRI negative for inducible ischemia.  -PFTs with DLCO pending.  -VQ scan negative for chronic pulmonary thromboembolism.    -Consider right heart catheterization if SOB worsens or signs of right heart failure.     Hypertension, continue multiple antihypertensives  Type 2 diabetes, diet controlled  __________________________________________________________________    Plan:  1. Complete PFTs as ordered.  2. Continue to monitor blood pressure at home, and keep a log.  3. Follow-up in 3 months after additional testing completed.  We will review these results and repeat blood pressure check at subsequent visit.  __________________________________________________________________    Thank you for the opportunity to participate  in this pleasant patient's care.    We would be happy to see this patient sooner for any concerns in the meantime.    JESSE Quinones, CNP   Nurse Practitioner  [facility]    Today's clinic visit entailed:  I personally reviewed the results of the additional testing: Cardiac MRI with stress, chest x-ray imaging, VQ scan.  Prescription drug management  The level of medical decision making during this visit was of moderate complexity.    Orders this Visit:  Orders Placed This Encounter   Procedures     Follow-Up with Cardiology- MADY     Orders Placed This Encounter   Medications     Blood Pressure KIT     Si kit once for 1 dose     Dispense:  1 kit     Refill:  0     There are no discontinued medications.  Encounter Diagnoses   Name Primary?     Pulmonary hypertension (H)      SOB (shortness of breath)      CURRENT MEDICATIONS:  Current Outpatient Medications   Medication Sig Dispense Refill     albuterol (PROAIR HFA/PROVENTIL HFA/VENTOLIN HFA) 108 (90 Base) MCG/ACT inhaler Inhale 2 puffs into the lungs every 6 hours as needed for shortness of breath / dyspnea or wheezing 18 g 11     aspirin 81 MG tablet Take 1 tablet by mouth daily.       diltiazem ER COATED BEADS (CARDIZEM CD) 360 MG 24 hr capsule TAKE 1 CAPSULE BY MOUTH EVERY DAY 90 capsule 3     fluticasone-salmeterol (ADVAIR DISKUS) 500-50 MCG/DOSE inhaler INHALE 1 PUFF INTO THE LUNGS EVERY 12 HOURS 3 Inhaler 3     hydrALAZINE (APRESOLINE) 50 MG tablet Take 1 tablet (50 mg) by mouth 3 times daily 270 tablet 3     labetalol (NORMODYNE) 100 MG tablet TAKE 1 TABLET BY MOUTH TWICE A  tablet 2     losartan (COZAAR) 100 MG tablet TAKE 1 TABLET BY MOUTH EVERY DAY 90 tablet 2     omeprazole (PRILOSEC) 40 MG DR capsule TAKE 1 CAPSULE BY MOUTH EVERY DAY 90 capsule 2     rosuvastatin (CRESTOR) 40 MG tablet TAKE 1 TABLET BY MOUTH EVERY DAY 90 tablet 2     vitamin D3 (CHOLECALCIFEROL) 50 mcg (2000 units) tablet Take 1 tablet (50 mcg) by  "mouth daily       ezetimibe (ZETIA) 10 MG tablet Take 1 tablet (10 mg) by mouth daily (Patient not taking: Reported on 2/27/2023) 30 tablet 11     tiZANidine (ZANAFLEX) 2 MG tablet TAKE 1 TABLET (2 MG) BY MOUTH 3 TIMES DAILY AS NEEDED FOR MUSCLE SPASMS (Patient not taking: Reported on 2/27/2023) 90 tablet 1     ALLERGIES     Allergies   Allergen Reactions     Lisinopril Cough     Hydrochlorothiazide      Renal insufficiency     Iodine Hives     Iodine contrast     Metformin      Loose stools     Niacin      LEG CRAMPS     Norvasc [Amlodipine Besylate] Swelling     Simvastatin      ACHINESS       PAST MEDICAL, SURGICAL, FAMILY, SOCIAL HISTORY:  History was reviewed and updated as needed, see medical record.    Review of Systems:  Review Of Systems  Skin: negative  Eyes: negative  Ears/Nose/Throat: negative  Respiratory: Shortness of breath- stable and Dyspnea on exertion- stable  Cardiovascular: negative  Gastrointestinal: negative  Genitourinary: negative  Musculoskeletal: negative  Neurologic: negative  Psychiatric: negative  Hematologic/Lymphatic/Immunologic: negative  Endocrine: negative     Physical Exam:    Vitals: BP (!) 146/70 (BP Location: Left arm, Patient Position: Sitting)   Pulse 70   Ht 1.588 m (5' 2.5\")   Wt 57.3 kg (126 lb 4.8 oz)   SpO2 99%   BMI 22.73 kg/m    Constitutional: Appears stated age, well nourished, NAD.  Eyes: Pupils equal, round. Sclerae anicteric.   HEENT: Normocephalic, atraumatic.   Neck: Supple. Carotid pulses full and equal. No carotid bruit.  No JVD appreciated.  Respiratory: Non-labored. Lungs CTAB.  Cardiovascular: RRR, normal S1 and S2, II/VI systolic murmur at apex, No edema.  GI: Soft, non-distended, non-tender.  Skin: Warm and dry. No rashes, cyanosis, edema.  Musculoskeletal/Extremities: Symmetrical movement to all extremities.  Neurologic: No gross focal deficits. Alert, awake, and oriented to all spheres.  Psychiatric: Affect appropriate. Mentation normal.    Recent " Lab Results:  LIPID RESULTS:  Lab Results   Component Value Date    CHOL 180 09/30/2022    CHOL 182 07/23/2020    HDL 76 09/30/2022    HDL 63 07/23/2020    LDL 78 09/30/2022    LDL 91 07/23/2020    TRIG 128 09/30/2022    TRIG 140 07/23/2020    CHOLHDLRATIO 4.0 08/03/2015     LIVER ENZYME RESULTS:  Lab Results   Component Value Date    AST 12 09/30/2022    AST 15 06/08/2021    ALT 18 09/30/2022    ALT 18 06/08/2021     CBC RESULTS:  Lab Results   Component Value Date    WBC 6.1 02/08/2022    WBC 6.6 05/17/2019    RBC 4.25 02/08/2022    RBC 4.43 05/17/2019    HGB 12.1 02/08/2022    HGB 13.0 07/23/2020    HCT 38.1 02/08/2022    HCT 39.4 05/17/2019    MCV 90 02/08/2022    MCV 89 05/17/2019    MCH 28.5 02/08/2022    MCH 30.2 05/17/2019    MCHC 31.8 02/08/2022    MCHC 34.0 05/17/2019    RDW 13.2 02/08/2022    RDW 13.2 05/17/2019     02/08/2022     05/17/2019     BMP RESULTS:  Lab Results   Component Value Date     05/28/2022     06/08/2021    POTASSIUM 4.1 05/28/2022    POTASSIUM 4.1 06/08/2021    CHLORIDE 109 05/28/2022    CHLORIDE 103 06/08/2021    CO2 25 05/28/2022    CO2 26 06/08/2021    ANIONGAP 5 05/28/2022    ANIONGAP 6 06/08/2021     (H) 05/28/2022     (H) 06/08/2021    BUN 13 05/28/2022    BUN 17 06/08/2021    CR 1.03 05/28/2022    CR 1.23 (H) 06/08/2021    GFRESTIMATED 55 (L) 05/28/2022    GFRESTIMATED 42 (L) 06/08/2021    GFRESTBLACK 48 (L) 06/08/2021    JORDAN 8.9 05/28/2022    JORDAN 8.9 06/08/2021      A1C RESULTS:  Lab Results   Component Value Date    A1C 6.7 (H) 05/28/2022    A1C 6.7 (H) 06/08/2021     INR RESULTS:  Lab Results   Component Value Date    INR 0.97 04/16/2009       Thank you for allowing me to participate in the care of your patient.      Sincerely,     JESSE Quinones [facility]  cc:   Pilo Lara MD  [id number]  [id number]  [place] [id number]         Yes...

## 2023-05-10 ENCOUNTER — HOSPITAL ENCOUNTER (OUTPATIENT)
Dept: CARDIAC REHAB | Facility: CLINIC | Age: 83
Discharge: HOME | End: 2023-05-10
Attending: INTERNAL MEDICINE
Payer: COMMERCIAL

## 2023-05-10 DIAGNOSIS — R06.02 SOB (SHORTNESS OF BREATH): ICD-10-CM

## 2023-05-10 DIAGNOSIS — I27.20 PULMONARY HYPERTENSION (H): ICD-10-CM

## 2023-05-10 PROCEDURE — 94726 PLETHYSMOGRAPHY LUNG VOLUMES: CPT

## 2023-05-10 PROCEDURE — 94375 RESPIRATORY FLOW VOLUME LOOP: CPT

## 2023-05-10 PROCEDURE — 94729 DIFFUSING CAPACITY: CPT

## 2023-05-12 DIAGNOSIS — I10 ESSENTIAL HYPERTENSION: ICD-10-CM

## 2023-05-12 LAB
DLCOUNC-%PRED-PRE: 97 %
DLCOUNC-PRE: 16.98 ML/MIN/MMHG
DLCOUNC-PRED: 17.34 ML/MIN/MMHG
ERV-%PRED-PRE: 122 %
ERV-PRE: 0.78 L
ERV-PRED: 0.64 L
EXPTIME-PRE: 8.77 SEC
FEF2575-%PRED-PRE: 66 %
FEF2575-PRE: 0.92 L/SEC
FEF2575-PRED: 1.38 L/SEC
FEFMAX-%PRED-PRE: 98 %
FEFMAX-PRE: 4.34 L/SEC
FEFMAX-PRED: 4.39 L/SEC
FEV1-%PRED-PRE: 102 %
FEV1-PRE: 1.71 L
FEV1FEV6-PRE: 63 %
FEV1FEV6-PRED: 77 %
FEV1FVC-PRE: 62 %
FEV1FVC-PRED: 78 %
FEV1SVC-PRE: 64 %
FEV1SVC-PRED: 64 %
FIFMAX-PRE: 3.67 L/SEC
FRCPLETH-%PRED-PRE: 123 %
FRCPLETH-PRE: 3.22 L
FRCPLETH-PRED: 2.61 L
FVC-%PRED-PRE: 128 %
FVC-PRE: 2.76 L
FVC-PRED: 2.15 L
IC-%PRED-PRE: 96 %
IC-PRE: 1.87 L
IC-PRED: 1.94 L
RVPLETH-%PRED-PRE: 111 %
RVPLETH-PRE: 2.4 L
RVPLETH-PRED: 2.15 L
TLCPLETH-%PRED-PRE: 110 %
TLCPLETH-PRE: 5.09 L
TLCPLETH-PRED: 4.6 L
VA-%PRED-PRE: 106 %
VA-PRE: 4.42 L
VC-%PRED-PRE: 104 %
VC-PRE: 2.69 L
VC-PRED: 2.58 L

## 2023-05-12 RX ORDER — LABETALOL 100 MG/1
TABLET, FILM COATED ORAL
Qty: 180 TABLET | Refills: 1 | Status: SHIPPED | OUTPATIENT
Start: 2023-05-12 | End: 2023-12-18

## 2023-06-06 ENCOUNTER — OFFICE VISIT (OUTPATIENT)
Dept: CARDIOLOGY | Facility: CLINIC | Age: 83
End: 2023-06-06
Attending: NURSE PRACTITIONER
Payer: COMMERCIAL

## 2023-06-06 VITALS
BODY MASS INDEX: 21.88 KG/M2 | DIASTOLIC BLOOD PRESSURE: 60 MMHG | HEART RATE: 74 BPM | OXYGEN SATURATION: 98 % | HEIGHT: 63 IN | WEIGHT: 123.5 LBS | SYSTOLIC BLOOD PRESSURE: 138 MMHG

## 2023-06-06 DIAGNOSIS — R06.02 SOB (SHORTNESS OF BREATH): ICD-10-CM

## 2023-06-06 DIAGNOSIS — I27.20 PULMONARY HYPERTENSION (H): ICD-10-CM

## 2023-06-06 DIAGNOSIS — I35.0 NONRHEUMATIC AORTIC VALVE STENOSIS: ICD-10-CM

## 2023-06-06 DIAGNOSIS — I10 ESSENTIAL HYPERTENSION: Primary | ICD-10-CM

## 2023-06-06 PROCEDURE — 99214 OFFICE O/P EST MOD 30 MIN: CPT | Performed by: PHYSICIAN ASSISTANT

## 2023-06-06 NOTE — PROGRESS NOTES
Primary Cardiologist: Dr. Lara    Reason for Visit: 3 month follow up for HTN management    History of Present Illness:   Cristela is a pleasant 82 year old female with past medical history notable for:     # Nonrheumatic moderate aortic valve stenosis  -Mean gradient is 22  -annual echos recommended     # Shortness of breath  # Pulmonary hypertension, moderate  -Etiology of SOB is unclear --> differentials include pulmonary hypertension (cause unknown, though could be related to longstanding hypertension and diastolic dysfunction), ischemia or anginal equivalent, underlying lung disease.  -Cardiac stress MRI negative for inducible ischemia.  -PFTs showed mild obstruction with normal lung volumes and normal DLCO.  -VQ scan negative for chronic pulmonary thromboembolism.    -Consider right heart catheterization if SOB worsens or signs of right heart failure.     # Hypertension, continue multiple antihypertensives  # Type 2 diabetes, diet controlled     Cristela returns to clinic today, stating she is doing well.  She does report 3 episodes of feeling lightheaded after taking her morning medications.  She tells me that she usually does not have breakfast until about half hour to 1 hour after taking her medications.  She denies syncope, orthopnea, shortness of breath, chest pain, peripheral edema, abdominal distention, or bleeding issues.  She does have mild dyspnea on exertion but this does not seem to bother her as much.    Assessment and Plan:  Cristela is a pleasant 82 year old female with past medical history notable for:     # Nonrheumatic moderate aortic valve stenosis  -Mean gradient is 22  -annual echos recommended     # Shortness of breath  # Pulmonary hypertension, moderate  -Etiology of SOB is unclear --> differentials include pulmonary hypertension (cause unknown, though could be related to longstanding hypertension and diastolic dysfunction), ischemia or anginal equivalent, underlying lung disease.  -Cardiac  stress MRI negative for inducible ischemia.  -PFTs showed mild obstruction with normal lung volumes and normal DLCO.  -VQ scan negative for chronic pulmonary thromboembolism.    -Consider right heart catheterization if SOB worsens or signs of right heart failure.     # Hypertension  # Type 2 diabetes, diet controlled    I have recommended that she eats breakfast before taking her morning medications.  Hopefully this will resolve her infrequent episodes of lightheadedness.  Assuming she will feel better with this adjustment we will see her back in 3 months with repeat echocardiogram.  She has no evidence of hyperkalemia.  We also reviewed her PFT results.  I did look at the data myself.  She only has mild obstructive pattern but no significant abnormalities and her DLCO is normal.      This note was completed in part using Dragon voice recognition software. Although reviewed after completion, some word and grammatical errors may occur.    Orders this Visit:  No orders of the defined types were placed in this encounter.    No orders of the defined types were placed in this encounter.    There are no discontinued medications.      No diagnosis found.    CURRENT MEDICATIONS:  Current Outpatient Medications   Medication Sig Dispense Refill     albuterol (PROAIR HFA/PROVENTIL HFA/VENTOLIN HFA) 108 (90 Base) MCG/ACT inhaler Inhale 2 puffs into the lungs every 6 hours as needed for shortness of breath / dyspnea or wheezing 18 g 11     aspirin 81 MG tablet Take 1 tablet by mouth daily.       diltiazem ER COATED BEADS (CARDIZEM CD) 360 MG 24 hr capsule TAKE 1 CAPSULE BY MOUTH EVERY DAY 90 capsule 3     ezetimibe (ZETIA) 10 MG tablet Take 1 tablet (10 mg) by mouth daily 90 tablet 3     fluticasone-salmeterol (WIXELA INHUB) 500-50 MCG/ACT inhaler Inhale 1 puff into the lungs every 12 hours       hydrALAZINE (APRESOLINE) 50 MG tablet Take 1 tablet (50 mg) by mouth 3 times daily 270 tablet 3     labetalol (NORMODYNE) 100 MG  tablet TAKE 1 TABLET BY MOUTH TWICE A  tablet 1     losartan (COZAAR) 100 MG tablet Take 1 tablet (100 mg) by mouth daily 90 tablet 3     omeprazole (PRILOSEC) 40 MG DR capsule Take 1 capsule (40 mg) by mouth daily 90 capsule 3     rosuvastatin (CRESTOR) 40 MG tablet TAKE 1 TABLET BY MOUTH EVERY DAY 90 tablet 3     tiZANidine (ZANAFLEX) 2 MG tablet TAKE 1 TABLET (2 MG) BY MOUTH 3 TIMES DAILY AS NEEDED FOR MUSCLE SPASMS 90 tablet 1     vitamin D3 (CHOLECALCIFEROL) 50 mcg (2000 units) tablet Take 1 tablet (50 mcg) by mouth daily         ALLERGIES     Allergies   Allergen Reactions     Lisinopril Cough     Hydrochlorothiazide      Renal insufficiency     Iodine Hives     Iodine contrast     Metformin      Loose stools     Niacin      LEG CRAMPS     Norvasc [Amlodipine Besylate] Swelling     Simvastatin      ACHINESS         PAST MEDICAL HISTORY:  Past Medical History:   Diagnosis Date     CAD (coronary artery disease)      mild on CT angio     CKD (chronic kidney disease) stage 3, GFR 30-59 ml/min (H)      Colon polyps 2009     DDD (degenerative disc disease), lumbosacral 7/8/2022     Esophageal reflux 4/30/2008     Family history of ischemic heart disease      Fatigue 4/11/2013     Hiatal hernia 2009     History of blood transfusion 4/08    after hiatal hernia surgery     Hyperlipidemia LDL goal <70      Infection due to 2019 novel coronavirus 11/14/2022     Macular degeneration 1/3/2012     Mild persistent asthma      Nonrheumatic aortic valve stenosis 11/14/2022     Pulmonary hypertension (H) 8/20/2012    mild     Scoliosis     mild      Type 2 diabetes mellitus with diabetic chronic kidney disease  (goal A1C<8) 10/20/2015     Ulcer of the stomach and intestine 2009    Anthony's ulcer     Unspecified essential hypertension      Vitamin D deficiency 1/3/2012       PAST SURGICAL HISTORY:  Past Surgical History:   Procedure Laterality Date     ABDOMEN SURGERY  4/08    hiatal hernia     BREAST SURGERY  1974     biopsy-begign     CARDIAC SURGERY  1995    angiogram     COLONOSCOPY  2008     HERNIA REPAIR  2008     HYSTERECTOMY, IDRIS  1974    Fibroids     ZZC OPEN STOMACH,REPR ULCER,SUTURE  2009    Hiatal hernia, and Anthony's ulcer       FAMILY HISTORY:  Family History   Problem Relation Age of Onset     Heart Disease Mother         Rheumatic fever     Heart Disease Father         CAD, DM, PVD     C.A.D. Father         1st MI in 80s     Hypertension Sister         3 sisters, all with hypertension     Hypertension Sister      C.A.D. Maternal Uncle         age 54 yrs, sudden cardiac     C.A.D. Maternal Uncle         age 55 yrs       SOCIAL HISTORY:  Social History     Socioeconomic History     Marital status:      Number of children: 3   Occupational History     Employer: RETIRED   Tobacco Use     Smoking status: Never     Smokeless tobacco: Never   Substance and Sexual Activity     Alcohol use: Yes     Comment: 1-2 drinks week     Drug use: No     Sexual activity: Yes     Partners: Male   Other Topics Concern     Parent/sibling w/ CABG, MI or angioplasty before 65F 55M? No     Caffeine Concern No     Comment: 1-2 cups daily     Sleep Concern No     Comment: nocturia     Special Diet Yes     Comment: low salt, limits sugar     Exercise No     Comment: walking, yardwork, limited due to plantar fascitis       Review of Systems:  Skin:        Eyes:       ENT:       Respiratory:       Cardiovascular:       Gastroenterology:      Genitourinary:       Musculoskeletal:       Neurologic:       Psychiatric:       Heme/Lymph/Imm:       Endocrine:         Physical Exam:  Vitals: There were no vitals taken for this visit.     GEN:  NAD  NECK: No JVD  C/V:  Regular rate and rhythm, no murmur, rub or gallop.  RESP: Clear to auscultation bilaterally.  GI: Abdomen soft, nontender, nondistended.   EXTREM: No pitting LE edema.   NEURO: Alert and oriented, cooperative. No obvious focal deficits.   PSYCH: Normal affect.  SKIN: Warm and dry.        Recent Lab Results:  LIPID RESULTS:  Lab Results   Component Value Date    CHOL 180 09/30/2022    CHOL 182 07/23/2020    HDL 76 09/30/2022    HDL 63 07/23/2020    LDL 78 09/30/2022    LDL 91 07/23/2020    TRIG 128 09/30/2022    TRIG 140 07/23/2020    CHOLHDLRATIO 4.0 08/03/2015       LIVER ENZYME RESULTS:  Lab Results   Component Value Date    AST 12 09/30/2022    AST 15 06/08/2021    ALT 18 09/30/2022    ALT 18 06/08/2021       CBC RESULTS:  Lab Results   Component Value Date    WBC 6.1 02/08/2022    WBC 6.6 05/17/2019    RBC 4.25 02/08/2022    RBC 4.43 05/17/2019    HGB 12.1 02/08/2022    HGB 13.0 07/23/2020    HCT 38.1 02/08/2022    HCT 39.4 05/17/2019    MCV 90 02/08/2022    MCV 89 05/17/2019    MCH 28.5 02/08/2022    MCH 30.2 05/17/2019    MCHC 31.8 02/08/2022    MCHC 34.0 05/17/2019    RDW 13.2 02/08/2022    RDW 13.2 05/17/2019     02/08/2022     05/17/2019       BMP RESULTS:  Lab Results   Component Value Date     03/16/2023     06/08/2021    POTASSIUM 4.2 03/16/2023    POTASSIUM 4.1 05/28/2022    POTASSIUM 4.1 06/08/2021    CHLORIDE 108 (H) 03/16/2023    CHLORIDE 109 05/28/2022    CHLORIDE 103 06/08/2021    CO2 23 03/16/2023    CO2 25 05/28/2022    CO2 26 06/08/2021    ANIONGAP 11 03/16/2023    ANIONGAP 5 05/28/2022    ANIONGAP 6 06/08/2021     (H) 03/16/2023     (H) 05/28/2022     (H) 06/08/2021    BUN 13.9 03/16/2023    BUN 13 05/28/2022    BUN 17 06/08/2021    CR 1.16 (H) 03/16/2023    CR 1.23 (H) 06/08/2021    GFRESTIMATED 47 (L) 03/16/2023    GFRESTIMATED 42 (L) 06/08/2021    GFRESTBLACK 48 (L) 06/08/2021    JORDAN 9.7 03/16/2023    JORDAN 8.9 06/08/2021        A1C RESULTS:  Lab Results   Component Value Date    A1C 6.9 (H) 03/16/2023    A1C 6.7 (H) 06/08/2021       INR RESULTS:  Lab Results   Component Value Date    INR 0.97 04/16/2009           Nhung Davila PA-C  June 6, 2023

## 2023-06-06 NOTE — LETTER
6/6/2023    Jose De Jesus Snow MD  [address] [facility] [address]    RE: Cristela Salgado       Dear Colleague,     I had the pleasure of seeing Cristela Salgado in the [facility].  Primary Cardiologist: Dr. Lara    Reason for Visit: 3 month follow up for HTN management    History of Present Illness:   Cristela is a pleasant 82 year old female with past medical history notable for:     # Nonrheumatic moderate aortic valve stenosis  -Mean gradient is 22  -annual echos recommended     # Shortness of breath  # Pulmonary hypertension, moderate  -Etiology of SOB is unclear --> differentials include pulmonary hypertension (cause unknown, though could be related to longstanding hypertension and diastolic dysfunction), ischemia or anginal equivalent, underlying lung disease.  -Cardiac stress MRI negative for inducible ischemia.  -PFTs showed mild obstruction with normal lung volumes and normal DLCO.  -VQ scan negative for chronic pulmonary thromboembolism.    -Consider right heart catheterization if SOB worsens or signs of right heart failure.     # Hypertension, continue multiple antihypertensives  # Type 2 diabetes, diet controlled     Cristela returns to clinic today, stating she is doing well.  She does report 3 episodes of feeling lightheaded after taking her morning medications.  She tells me that she usually does not have breakfast until about half hour to 1 hour after taking her medications.  She denies syncope, orthopnea, shortness of breath, chest pain, peripheral edema, abdominal distention, or bleeding issues.  She does have mild dyspnea on exertion but this does not seem to bother her as much.    Assessment and Plan:  Cristela is a pleasant 82 year old female with past medical history notable for:     # Nonrheumatic moderate aortic valve stenosis  -Mean gradient is 22  -annual echos recommended     # Shortness of breath  # Pulmonary hypertension, moderate  -Etiology of SOB is unclear -->  differentials include pulmonary hypertension (cause unknown, though could be related to longstanding hypertension and diastolic dysfunction), ischemia or anginal equivalent, underlying lung disease.  -Cardiac stress MRI negative for inducible ischemia.  -PFTs showed mild obstruction with normal lung volumes and normal DLCO.  -VQ scan negative for chronic pulmonary thromboembolism.    -Consider right heart catheterization if SOB worsens or signs of right heart failure.     # Hypertension  # Type 2 diabetes, diet controlled    I have recommended that she eats breakfast before taking her morning medications.  Hopefully this will resolve her infrequent episodes of lightheadedness.  Assuming she will feel better with this adjustment we will see her back in 3 months with repeat echocardiogram.  She has no evidence of hyperkalemia.  We also reviewed her PFT results.  I did look at the data myself.  She only has mild obstructive pattern but no significant abnormalities and her DLCO is normal.      This note was completed in part using Dragon voice recognition software. Although reviewed after completion, some word and grammatical errors may occur.    Orders this Visit:  No orders of the defined types were placed in this encounter.    No orders of the defined types were placed in this encounter.    There are no discontinued medications.      No diagnosis found.    CURRENT MEDICATIONS:  Current Outpatient Medications   Medication Sig Dispense Refill    albuterol (PROAIR HFA/PROVENTIL HFA/VENTOLIN HFA) 108 (90 Base) MCG/ACT inhaler Inhale 2 puffs into the lungs every 6 hours as needed for shortness of breath / dyspnea or wheezing 18 g 11    aspirin 81 MG tablet Take 1 tablet by mouth daily.      diltiazem ER COATED BEADS (CARDIZEM CD) 360 MG 24 hr capsule TAKE 1 CAPSULE BY MOUTH EVERY DAY 90 capsule 3    ezetimibe (ZETIA) 10 MG tablet Take 1 tablet (10 mg) by mouth daily 90 tablet 3    fluticasone-salmeterol (WIXELA INHUB)  500-50 MCG/ACT inhaler Inhale 1 puff into the lungs every 12 hours      hydrALAZINE (APRESOLINE) 50 MG tablet Take 1 tablet (50 mg) by mouth 3 times daily 270 tablet 3    labetalol (NORMODYNE) 100 MG tablet TAKE 1 TABLET BY MOUTH TWICE A  tablet 1    losartan (COZAAR) 100 MG tablet Take 1 tablet (100 mg) by mouth daily 90 tablet 3    omeprazole (PRILOSEC) 40 MG DR capsule Take 1 capsule (40 mg) by mouth daily 90 capsule 3    rosuvastatin (CRESTOR) 40 MG tablet TAKE 1 TABLET BY MOUTH EVERY DAY 90 tablet 3    tiZANidine (ZANAFLEX) 2 MG tablet TAKE 1 TABLET (2 MG) BY MOUTH 3 TIMES DAILY AS NEEDED FOR MUSCLE SPASMS 90 tablet 1    vitamin D3 (CHOLECALCIFEROL) 50 mcg (2000 units) tablet Take 1 tablet (50 mcg) by mouth daily         ALLERGIES     Allergies   Allergen Reactions    Lisinopril Cough    Hydrochlorothiazide      Renal insufficiency    Iodine Hives     Iodine contrast    Metformin      Loose stools    Niacin      LEG CRAMPS    Norvasc [Amlodipine Besylate] Swelling    Simvastatin      ACHINESS         PAST MEDICAL HISTORY:  Past Medical History:   Diagnosis Date    CAD (coronary artery disease)      mild on CT angio    CKD (chronic kidney disease) stage 3, GFR 30-59 ml/min (H)     Colon polyps 2009    DDD (degenerative disc disease), lumbosacral 7/8/2022    Esophageal reflux 4/30/2008    Family history of ischemic heart disease     Fatigue 4/11/2013    Hiatal hernia 2009    History of blood transfusion 4/08    after hiatal hernia surgery    Hyperlipidemia LDL goal <70     Infection due to 2019 novel coronavirus 11/14/2022    Macular degeneration 1/3/2012    Mild persistent asthma     Nonrheumatic aortic valve stenosis 11/14/2022    Pulmonary hypertension (H) 8/20/2012    mild    Scoliosis     mild     Type 2 diabetes mellitus with diabetic chronic kidney disease  (goal A1C<8) 10/20/2015    Ulcer of the stomach and intestine 2009    Anthony's ulcer    Unspecified essential hypertension     Vitamin D  deficiency 1/3/2012       PAST SURGICAL HISTORY:  Past Surgical History:   Procedure Laterality Date    ABDOMEN SURGERY  4/08    hiatal hernia    BREAST SURGERY  1974    biopsy-begign    CARDIAC SURGERY  1995    angiogram    COLONOSCOPY  2008    HERNIA REPAIR  2008    HYSTERECTOMY, IDRIS  1974    Fibroids    ZZC OPEN STOMACH,REPR ULCER,SUTURE  2009    Hiatal hernia, and Anthony's ulcer       FAMILY HISTORY:  Family History   Problem Relation Age of Onset    Heart Disease Mother         Rheumatic fever    Heart Disease Father         CAD, DM, PVD    C.A.D. Father         1st MI in 80s    Hypertension Sister         3 sisters, all with hypertension    Hypertension Sister     C.A.D. Maternal Uncle         age 54 yrs, sudden cardiac    C.A.D. Maternal Uncle         age 55 yrs       SOCIAL HISTORY:  Social History     Socioeconomic History    Marital status:     Number of children: 3   Occupational History     Employer: RETIRED   Tobacco Use    Smoking status: Never    Smokeless tobacco: Never   Substance and Sexual Activity    Alcohol use: Yes     Comment: 1-2 drinks week    Drug use: No    Sexual activity: Yes     Partners: Male   Other Topics Concern    Parent/sibling w/ CABG, MI or angioplasty before 65F 55M? No    Caffeine Concern No     Comment: 1-2 cups daily    Sleep Concern No     Comment: nocturia    Special Diet Yes     Comment: low salt, limits sugar    Exercise No     Comment: walking, yardwork, limited due to plantar fascitis       Review of Systems:  Skin:        Eyes:       ENT:       Respiratory:       Cardiovascular:       Gastroenterology:      Genitourinary:       Musculoskeletal:       Neurologic:       Psychiatric:       Heme/Lymph/Imm:       Endocrine:         Physical Exam:  Vitals: There were no vitals taken for this visit.     GEN:  NAD  NECK: No JVD  C/V:  Regular rate and rhythm, no murmur, rub or gallop.  RESP: Clear to auscultation bilaterally.  GI: Abdomen soft, nontender,  nondistended.   EXTREM: No pitting LE edema.   NEURO: Alert and oriented, cooperative. No obvious focal deficits.   PSYCH: Normal affect.  SKIN: Warm and dry.       Recent Lab Results:  LIPID RESULTS:  Lab Results   Component Value Date    CHOL 180 09/30/2022    CHOL 182 07/23/2020    HDL 76 09/30/2022    HDL 63 07/23/2020    LDL 78 09/30/2022    LDL 91 07/23/2020    TRIG 128 09/30/2022    TRIG 140 07/23/2020    CHOLHDLRATIO 4.0 08/03/2015       LIVER ENZYME RESULTS:  Lab Results   Component Value Date    AST 12 09/30/2022    AST 15 06/08/2021    ALT 18 09/30/2022    ALT 18 06/08/2021       CBC RESULTS:  Lab Results   Component Value Date    WBC 6.1 02/08/2022    WBC 6.6 05/17/2019    RBC 4.25 02/08/2022    RBC 4.43 05/17/2019    HGB 12.1 02/08/2022    HGB 13.0 07/23/2020    HCT 38.1 02/08/2022    HCT 39.4 05/17/2019    MCV 90 02/08/2022    MCV 89 05/17/2019    MCH 28.5 02/08/2022    MCH 30.2 05/17/2019    MCHC 31.8 02/08/2022    MCHC 34.0 05/17/2019    RDW 13.2 02/08/2022    RDW 13.2 05/17/2019     02/08/2022     05/17/2019       BMP RESULTS:  Lab Results   Component Value Date     03/16/2023     06/08/2021    POTASSIUM 4.2 03/16/2023    POTASSIUM 4.1 05/28/2022    POTASSIUM 4.1 06/08/2021    CHLORIDE 108 (H) 03/16/2023    CHLORIDE 109 05/28/2022    CHLORIDE 103 06/08/2021    CO2 23 03/16/2023    CO2 25 05/28/2022    CO2 26 06/08/2021    ANIONGAP 11 03/16/2023    ANIONGAP 5 05/28/2022    ANIONGAP 6 06/08/2021     (H) 03/16/2023     (H) 05/28/2022     (H) 06/08/2021    BUN 13.9 03/16/2023    BUN 13 05/28/2022    BUN 17 06/08/2021    CR 1.16 (H) 03/16/2023    CR 1.23 (H) 06/08/2021    GFRESTIMATED 47 (L) 03/16/2023    GFRESTIMATED 42 (L) 06/08/2021    GFRESTBLACK 48 (L) 06/08/2021    JORDAN 9.7 03/16/2023    JORDAN 8.9 06/08/2021        A1C RESULTS:  Lab Results   Component Value Date    A1C 6.9 (H) 03/16/2023    A1C 6.7 (H) 06/08/2021       INR RESULTS:  Lab Results    Component Value Date    INR 0.97 04/16/2009           Nhung Davila PA-C  June 6, 2023         Thank you for allowing me to participate in the care of your patient.      Sincerely,     Nhung Davila PA-C     [facility]  cc:   JESSE Quinones CNP  [address]  [place] [address]

## 2023-06-06 NOTE — PATIENT INSTRUCTIONS
Today's Plan:   1) Make sure to have  breakfast before taking morning blood pressure medications.   2) Come back in 3-4 months with repeat echo before the visit to assess your heart valve.     If you have questions or concerns please call my nurse team at [phone]    Scheduling phone number: [phone]  Reminder: Please bring in all current medications, over the counter supplements and vitamin bottles to your next appointment.    It was a pleasure seeing you today!

## 2023-06-14 ENCOUNTER — PATIENT OUTREACH (OUTPATIENT)
Dept: CARE COORDINATION | Facility: CLINIC | Age: 83
End: 2023-06-14
Payer: COMMERCIAL

## 2023-07-12 ENCOUNTER — PATIENT OUTREACH (OUTPATIENT)
Dept: CARE COORDINATION | Facility: CLINIC | Age: 83
End: 2023-07-12
Payer: COMMERCIAL

## 2023-09-26 ENCOUNTER — HOSPITAL ENCOUNTER (OUTPATIENT)
Dept: CARDIOLOGY | Facility: CLINIC | Age: 83
Discharge: HOME | End: 2023-09-26
Attending: PHYSICIAN ASSISTANT | Admitting: PHYSICIAN ASSISTANT
Payer: COMMERCIAL

## 2023-09-26 DIAGNOSIS — R06.02 SOB (SHORTNESS OF BREATH): ICD-10-CM

## 2023-09-26 DIAGNOSIS — I27.20 PULMONARY HYPERTENSION (H): ICD-10-CM

## 2023-09-26 LAB — LVEF ECHO: NORMAL

## 2023-09-26 PROCEDURE — 93306 TTE W/DOPPLER COMPLETE: CPT | Mod: 26 | Performed by: INTERNAL MEDICINE

## 2023-09-26 PROCEDURE — 93306 TTE W/DOPPLER COMPLETE: CPT

## 2023-10-02 NOTE — PROGRESS NOTES
HISTORY OF PRESENT ILLNESS:    This is a 82 year old female who follows with Dr Lara at [facility]  Her past medical history includes:  Hypertension, coronary artery disease, aortic stenosis, hyperlipidemia, pulmonary hypertension, diet controlled type II diabetes, spinal stenosis.    Ms Salgado underwent a stress test (2007) due to chest pain. This led to CT angiogram then showing mild diffuse multivessel coronary artery disease.    ECHO (2011) LVEF 55%, impaired LV relaxation, mild pulmonary hypertension, 1-2+ TR    She requires numerous agents to control her blood pressure and has many drug intolerances. (Amlodipine, hydralazine, hydrochlorothiazide )    Exercise stress NUC (2017) showed probably normal perfusion  LVEF 84%    ECHO (2021)  LVEF 55%, normal RV function, 1-2+ MR, 2+ TR, RVSP 40 mmHg, mild aortic stenosis.    She has a long history of chronic shortness of breath  ECHOs have shown pulmonary hypertension and PFTs have shown mild obstruction with normal lung volumes and DLCO.  A VQ scan was negative for chronic pulmonary thromboembolism    ECHO (2022) showed LVEF 60-65%, normal RV function, 1+ MR, 1-2+ TR, RVSP 55 mmHg, moderate aortic stenosis (mean gradient 22 mmHg, CHANG 1.2 cm2, V max 3.1 m/s)    cMRI (1/2023) showed normal biventricular function, no evidence of ischemia  There was a non CAD small scar in the inferoseptum at the RV attachment region which can be seen in patients with pulmonary hypertension.    She returns today for reassessment and ECHO    Ms Salgado is active without any significant limitations  She denies any chest pain or significant shortness of breath  She admits to poor sleep and has a difficult time falling asleep  She does not think she has sleep apnea  She denies palpitations, orthopnea, or peripheral edema  She has not needed to use her inhaler and has not had any asthma exacerbations    She checks her blood pressure at home and tells me that her BP is staying  <140/90 mmHg        ECHO (9/26/23) showed LVEF 55-60%, grade II LVD, normal RV function, biatrial enlargement, 1-2+ MR, 1-2+ TR, moderate pulmonary hypertension, moderate aortic stenosis  (mean gradient 27 mmHg, CHANG 1.1 cm2)    VITAL SIGNS:  BP:  129/61  Pulse:  64  Weight:   126 lbs  (BMI: 22)      IMPRESSION AND PLAN:    Hypertension:  -on Losartan 100 mg, hydralazine 50 mg TID, Diltiazem 360 mg  -BP controlled    Moderate Pulmonary Hypertension:  -mild asthma, diastolic dysfunction, hypertension contributing  -consider RHC with worsening symptoms    Moderate Aortic Stenosis:  -CHANG 1.1 cm2, mean gradient 27 mmHg  -repeat ECHO annually    Coronary Artery Disease:  -mild diffuse disease per CTA (2007)  -no ischemia per cMRI (2023)  -denies angina    Hyperlipidemia:  -on Crestor 40 mg + Zetia  -has upcoming visit with PMD with fasting labs    Stage III CKD:  -creatinine 1.1-1.2    Type II Diabetes:  -diet controlled  -Hgb A1C  6.9    The total time for the visit today was 30 minutes which includes patient visit, reviewing of records, discussion, and placing of orders of the outpatient coordination of cardiovascular care as described.  The level of medical decision making during this visit was of moderate complexity.  Thank you for allowing me to participate in their care.     I will arrange follow up with ECHO next year with cardiologist in [place] per her preference    Orders Placed This Encounter   Procedures    Follow-Up with Cardiology    Echocardiogram Complete       No orders of the defined types were placed in this encounter.      There are no discontinued medications.      Encounter Diagnoses   Name Primary?    Pulmonary hypertension (H)     SOB (shortness of breath)     Nonrheumatic aortic valve stenosis Yes       CURRENT MEDICATIONS:  Current Outpatient Medications   Medication Sig Dispense Refill    albuterol (PROAIR HFA/PROVENTIL HFA/VENTOLIN HFA) 108 (90 Base) MCG/ACT inhaler Inhale 2 puffs into the  lungs every 6 hours as needed for shortness of breath / dyspnea or wheezing 18 g 11    aspirin 81 MG tablet Take 1 tablet by mouth daily.      diltiazem ER COATED BEADS (CARDIZEM CD) 360 MG 24 hr capsule TAKE 1 CAPSULE BY MOUTH EVERY DAY 90 capsule 3    ezetimibe (ZETIA) 10 MG tablet Take 1 tablet (10 mg) by mouth daily 90 tablet 3    fluticasone-salmeterol (WIXELA INHUB) 500-50 MCG/ACT inhaler Inhale 1 puff into the lungs every 12 hours      hydrALAZINE (APRESOLINE) 50 MG tablet Take 1 tablet (50 mg) by mouth 3 times daily 270 tablet 3    labetalol (NORMODYNE) 100 MG tablet TAKE 1 TABLET BY MOUTH TWICE A  tablet 1    losartan (COZAAR) 100 MG tablet Take 1 tablet (100 mg) by mouth daily 90 tablet 3    omeprazole (PRILOSEC) 40 MG DR capsule Take 1 capsule (40 mg) by mouth daily 90 capsule 3    rosuvastatin (CRESTOR) 40 MG tablet TAKE 1 TABLET BY MOUTH EVERY DAY 90 tablet 3    tiZANidine (ZANAFLEX) 2 MG tablet TAKE 1 TABLET (2 MG) BY MOUTH 3 TIMES DAILY AS NEEDED FOR MUSCLE SPASMS 90 tablet 1    vitamin D3 (CHOLECALCIFEROL) 50 mcg (2000 units) tablet Take 1 tablet (50 mcg) by mouth daily         ALLERGIES     Allergies   Allergen Reactions    Lisinopril Cough    Hydrochlorothiazide      Renal insufficiency    Iodine Hives     Iodine contrast    Metformin      Loose stools    Niacin      LEG CRAMPS    Norvasc [Amlodipine Besylate] Swelling    Simvastatin      ACHINESS         PAST MEDICAL HISTORY:  Past Medical History:   Diagnosis Date    CAD (coronary artery disease)      mild on CT angio    CKD (chronic kidney disease) stage 3, GFR 30-59 ml/min (H)     Colon polyps 2009    DDD (degenerative disc disease), lumbosacral 7/8/2022    Esophageal reflux 4/30/2008    Family history of ischemic heart disease     Fatigue 4/11/2013    Hiatal hernia 2009    History of blood transfusion 4/08    after hiatal hernia surgery    Hyperlipidemia LDL goal <70     Infection due to 2019 novel coronavirus 11/14/2022    Macular  degeneration 1/3/2012    Mild persistent asthma     Nonrheumatic aortic valve stenosis 11/14/2022    Pulmonary hypertension (H) 8/20/2012    mild    Scoliosis     mild     Type 2 diabetes mellitus with diabetic chronic kidney disease  (goal A1C<8) 10/20/2015    Ulcer of the stomach and intestine 2009    Anthony's ulcer    Unspecified essential hypertension     Vitamin D deficiency 1/3/2012       PAST SURGICAL HISTORY:  Past Surgical History:   Procedure Laterality Date    ABDOMEN SURGERY  4/08    hiatal hernia    BREAST SURGERY  1974    biopsy-begign    CARDIAC SURGERY  1995    angiogram    COLONOSCOPY  2008    HERNIA REPAIR  2008    HYSTERECTOMY, IDRIS  1974    Fibroids    ZZC OPEN STOMACH,REPR ULCER,SUTURE  2009    Hiatal hernia, and Anthony's ulcer       FAMILY HISTORY:  Family History   Problem Relation Age of Onset    Heart Disease Mother         Rheumatic fever    Heart Disease Father         CAD, DM, PVD    C.A.D. Father         1st MI in 80s    Hypertension Sister         3 sisters, all with hypertension    Hypertension Sister     Dementia Sister     Family History Negative Son     Family History Negative Daughter     Family History Negative Daughter     C.A.D. Maternal Uncle         age 54 yrs, sudden cardiac    C.A.D. Maternal Uncle         age 55 yrs       SOCIAL HISTORY:  Social History     Socioeconomic History    Marital status:      Spouse name: None    Number of children: 3    Years of education: None    Highest education level: None   Occupational History     Employer: RETIRED   Tobacco Use    Smoking status: Never    Smokeless tobacco: Never   Substance and Sexual Activity    Alcohol use: Yes     Comment: 1-2 drinks week    Drug use: No    Sexual activity: Yes     Partners: Male   Other Topics Concern    Parent/sibling w/ CABG, MI or angioplasty before 65F 55M? No    Caffeine Concern No     Comment: 1-2 cups daily    Sleep Concern No     Comment: nocturia    Special Diet Yes     Comment: low  "salt, limits sugar    Exercise No     Comment: walking, yardwork, limited due to plantar fascitis       Review of Systems:  Skin:  not assessed       Eyes:  Positive for glasses;cataracts    ENT:  not assessed      Respiratory:  Positive for shortness of breath;wheezing pt has asthma (\"nothing new\" pt states)   Cardiovascular:  Negative      Gastroenterology: not assessed      Genitourinary:  not assessed      Musculoskeletal:  not assessed      Neurologic:  not assessed      Psychiatric:  not assessed      Heme/Lymph/Imm:  not assessed      Endocrine:  not assessed        Physical Exam:  Vitals: /61 (BP Location: Right arm, Patient Position: Sitting, Cuff Size: Adult Regular)   Pulse 64   Ht 1.588 m (5' 2.5\")   Wt 57.2 kg (126 lb)   SpO2 99%   BMI 22.68 kg/m      Constitutional:  cooperative        Skin:  warm and dry to the touch          Head:  normocephalic        Eyes:           Lymph:      ENT:  no pallor or cyanosis        Neck:    transmitted murmur      Respiratory:  clear to auscultation;normal respiratory excursion         Cardiac: regular rhythm;normal S1 and S2;no S3 or S4       systolic ejection murmur;grade 2;LLSB     radiating to the RUSB  pulses full and equal                                        GI:  abdomen soft        Extremities and Muscular Skeletal:  no edema              Neurological:  no gross motor deficits        Psych:  affect appropriate, oriented to time, person and place          REHAN Davila PA-C  [address] [id number]                    "

## 2023-10-04 ENCOUNTER — OFFICE VISIT (OUTPATIENT)
Dept: CARDIOLOGY | Facility: CLINIC | Age: 83
End: 2023-10-04
Attending: PHYSICIAN ASSISTANT
Payer: COMMERCIAL

## 2023-10-04 VITALS
SYSTOLIC BLOOD PRESSURE: 129 MMHG | OXYGEN SATURATION: 99 % | HEIGHT: 63 IN | DIASTOLIC BLOOD PRESSURE: 61 MMHG | HEART RATE: 64 BPM | BODY MASS INDEX: 22.32 KG/M2 | WEIGHT: 126 LBS

## 2023-10-04 DIAGNOSIS — I27.20 PULMONARY HYPERTENSION (H): ICD-10-CM

## 2023-10-04 DIAGNOSIS — R06.02 SOB (SHORTNESS OF BREATH): ICD-10-CM

## 2023-10-04 DIAGNOSIS — I35.0 NONRHEUMATIC AORTIC VALVE STENOSIS: Primary | ICD-10-CM

## 2023-10-04 PROCEDURE — 99214 OFFICE O/P EST MOD 30 MIN: CPT | Performed by: NURSE PRACTITIONER

## 2023-10-04 NOTE — LETTER
10/4/2023    Jose De Jesus Snow MD  [address] [address]    RE: Cristela Robledomini       Dear Colleague,     I had the pleasure of seeing Cristela Salgado in the [facility].  HISTORY OF PRESENT ILLNESS:    This is a 82 year old female who follows with Dr Lara at [facility]  Her past medical history includes:  Hypertension, coronary artery disease, aortic stenosis, hyperlipidemia, pulmonary hypertension, diet controlled type II diabetes, spinal stenosis.    Ms Salgado underwent a stress test (2007) due to chest pain. This led to CT angiogram then showing mild diffuse multivessel coronary artery disease.    ECHO (2011) LVEF 55%, impaired LV relaxation, mild pulmonary hypertension, 1-2+ TR    She requires numerous agents to control her blood pressure and has many drug intolerances. (Amlodipine, hydralazine, hydrochlorothiazide )    Exercise stress NUC (2017) showed probably normal perfusion  LVEF 84%    ECHO (2021)  LVEF 55%, normal RV function, 1-2+ MR, 2+ TR, RVSP 40 mmHg, mild aortic stenosis.    She has a long history of chronic shortness of breath  ECHOs have shown pulmonary hypertension and PFTs have shown mild obstruction with normal lung volumes and DLCO.  A VQ scan was negative for chronic pulmonary thromboembolism    ECHO (2022) showed LVEF 60-65%, normal RV function, 1+ MR, 1-2+ TR, RVSP 55 mmHg, moderate aortic stenosis (mean gradient 22 mmHg, CHANG 1.2 cm2, V max 3.1 m/s)    cMRI (1/2023) showed normal biventricular function, no evidence of ischemia  There was a non CAD small scar in the inferoseptum at the RV attachment region which can be seen in patients with pulmonary hypertension.    She returns today for reassessment and ECHO    Ms Salgado is active without any significant limitations  She denies any chest pain or significant shortness of breath  She admits to poor sleep and has a difficult time falling asleep  She does not think she has sleep apnea  She denies  palpitations, orthopnea, or peripheral edema  She has not needed to use her inhaler and has not had any asthma exacerbations    She checks her blood pressure at home and tells me that her BP is staying <140/90 mmHg        ECHO (9/26/23) showed LVEF 55-60%, grade II LVD, normal RV function, biatrial enlargement, 1-2+ MR, 1-2+ TR, moderate pulmonary hypertension, moderate aortic stenosis  (mean gradient 27 mmHg, CHANG 1.1 cm2)    VITAL SIGNS:  BP:  129/61  Pulse:  64  Weight:   126 lbs  (BMI: 22)      IMPRESSION AND PLAN:    Hypertension:  -on Losartan 100 mg, hydralazine 50 mg TID, Diltiazem 360 mg  -BP controlled    Moderate Pulmonary Hypertension:  -mild asthma, diastolic dysfunction, hypertension contributing  -consider RHC with worsening symptoms    Moderate Aortic Stenosis:  -CHANG 1.1 cm2, mean gradient 27 mmHg  -repeat ECHO annually    Coronary Artery Disease:  -mild diffuse disease per CTA (2007)  -no ischemia per cMRI (2023)  -denies angina    Hyperlipidemia:  -on Crestor 40 mg + Zetia  -has upcoming visit with PMD with fasting labs    Stage III CKD:  -creatinine 1.1-1.2    Type II Diabetes:  -diet controlled  -Hgb A1C  6.9    The total time for the visit today was 30 minutes which includes patient visit, reviewing of records, discussion, and placing of orders of the outpatient coordination of cardiovascular care as described.  The level of medical decision making during this visit was of moderate complexity.  Thank you for allowing me to participate in their care.     I will arrange follow up with ECHO next year with cardiologist in [place] per her preference    Orders Placed This Encounter   Procedures    Follow-Up with Cardiology    Echocardiogram Complete       No orders of the defined types were placed in this encounter.      There are no discontinued medications.      Encounter Diagnoses   Name Primary?    Pulmonary hypertension (H)     SOB (shortness of breath)     Nonrheumatic aortic valve stenosis  Yes       CURRENT MEDICATIONS:  Current Outpatient Medications   Medication Sig Dispense Refill    albuterol (PROAIR HFA/PROVENTIL HFA/VENTOLIN HFA) 108 (90 Base) MCG/ACT inhaler Inhale 2 puffs into the lungs every 6 hours as needed for shortness of breath / dyspnea or wheezing 18 g 11    aspirin 81 MG tablet Take 1 tablet by mouth daily.      diltiazem ER COATED BEADS (CARDIZEM CD) 360 MG 24 hr capsule TAKE 1 CAPSULE BY MOUTH EVERY DAY 90 capsule 3    ezetimibe (ZETIA) 10 MG tablet Take 1 tablet (10 mg) by mouth daily 90 tablet 3    fluticasone-salmeterol (WIXELA INHUB) 500-50 MCG/ACT inhaler Inhale 1 puff into the lungs every 12 hours      hydrALAZINE (APRESOLINE) 50 MG tablet Take 1 tablet (50 mg) by mouth 3 times daily 270 tablet 3    labetalol (NORMODYNE) 100 MG tablet TAKE 1 TABLET BY MOUTH TWICE A  tablet 1    losartan (COZAAR) 100 MG tablet Take 1 tablet (100 mg) by mouth daily 90 tablet 3    omeprazole (PRILOSEC) 40 MG DR capsule Take 1 capsule (40 mg) by mouth daily 90 capsule 3    rosuvastatin (CRESTOR) 40 MG tablet TAKE 1 TABLET BY MOUTH EVERY DAY 90 tablet 3    tiZANidine (ZANAFLEX) 2 MG tablet TAKE 1 TABLET (2 MG) BY MOUTH 3 TIMES DAILY AS NEEDED FOR MUSCLE SPASMS 90 tablet 1    vitamin D3 (CHOLECALCIFEROL) 50 mcg (2000 units) tablet Take 1 tablet (50 mcg) by mouth daily         ALLERGIES     Allergies   Allergen Reactions    Lisinopril Cough    Hydrochlorothiazide      Renal insufficiency    Iodine Hives     Iodine contrast    Metformin      Loose stools    Niacin      LEG CRAMPS    Norvasc [Amlodipine Besylate] Swelling    Simvastatin      ACHINESS         PAST MEDICAL HISTORY:  Past Medical History:   Diagnosis Date    CAD (coronary artery disease)      mild on CT angio    CKD (chronic kidney disease) stage 3, GFR 30-59 ml/min (H)     Colon polyps 2009    DDD (degenerative disc disease), lumbosacral 7/8/2022    Esophageal reflux 4/30/2008    Family history of ischemic heart disease      Fatigue 4/11/2013    Hiatal hernia 2009    History of blood transfusion 4/08    after hiatal hernia surgery    Hyperlipidemia LDL goal <70     Infection due to 2019 novel coronavirus 11/14/2022    Macular degeneration 1/3/2012    Mild persistent asthma     Nonrheumatic aortic valve stenosis 11/14/2022    Pulmonary hypertension (H) 8/20/2012    mild    Scoliosis     mild     Type 2 diabetes mellitus with diabetic chronic kidney disease  (goal A1C<8) 10/20/2015    Ulcer of the stomach and intestine 2009    Anthony's ulcer    Unspecified essential hypertension     Vitamin D deficiency 1/3/2012       PAST SURGICAL HISTORY:  Past Surgical History:   Procedure Laterality Date    ABDOMEN SURGERY  4/08    hiatal hernia    BREAST SURGERY  1974    biopsy-begign    CARDIAC SURGERY  1995    angiogram    COLONOSCOPY  2008    HERNIA REPAIR  2008    HYSTERECTOMY, IDRIS  1974    Fibroids    ZZC OPEN STOMACH,REPR ULCER,SUTURE  2009    Hiatal hernia, and Anthony's ulcer       FAMILY HISTORY:  Family History   Problem Relation Age of Onset    Heart Disease Mother         Rheumatic fever    Heart Disease Father         CAD, DM, PVD    C.A.D. Father         1st MI in 80s    Hypertension Sister         3 sisters, all with hypertension    Hypertension Sister     Dementia Sister     Family History Negative Son     Family History Negative Daughter     Family History Negative Daughter     C.A.D. Maternal Uncle         age 54 yrs, sudden cardiac    C.A.D. Maternal Uncle         age 55 yrs       SOCIAL HISTORY:  Social History     Socioeconomic History    Marital status:      Spouse name: None    Number of children: 3    Years of education: None    Highest education level: None   Occupational History     Employer: RETIRED   Tobacco Use    Smoking status: Never    Smokeless tobacco: Never   Substance and Sexual Activity    Alcohol use: Yes     Comment: 1-2 drinks week    Drug use: No    Sexual activity: Yes     Partners: Male   Other  "Topics Concern    Parent/sibling w/ CABG, MI or angioplasty before 65F 55M? No    Caffeine Concern No     Comment: 1-2 cups daily    Sleep Concern No     Comment: nocturia    Special Diet Yes     Comment: low salt, limits sugar    Exercise No     Comment: walking, yardwork, limited due to plantar fascitis       Review of Systems:  Skin:  not assessed       Eyes:  Positive for glasses;cataracts    ENT:  not assessed      Respiratory:  Positive for shortness of breath;wheezing pt has asthma (\"nothing new\" pt states)   Cardiovascular:  Negative      Gastroenterology: not assessed      Genitourinary:  not assessed      Musculoskeletal:  not assessed      Neurologic:  not assessed      Psychiatric:  not assessed      Heme/Lymph/Imm:  not assessed      Endocrine:  not assessed        Physical Exam:  Vitals: /61 (BP Location: Right arm, Patient Position: Sitting, Cuff Size: Adult Regular)   Pulse 64   Ht 1.588 m (5' 2.5\")   Wt 57.2 kg (126 lb)   SpO2 99%   BMI 22.68 kg/m      Constitutional:  cooperative        Skin:  warm and dry to the touch          Head:  normocephalic        Eyes:           Lymph:      ENT:  no pallor or cyanosis        Neck:    transmitted murmur      Respiratory:  clear to auscultation;normal respiratory excursion         Cardiac: regular rhythm;normal S1 and S2;no S3 or S4       systolic ejection murmur;grade 2;LLSB     radiating to the RUSB  pulses full and equal                                        GI:  abdomen soft        Extremities and Muscular Skeletal:  no edema              Neurological:  no gross motor deficits        Psych:  affect appropriate, oriented to time, person and place          REHAN Davila, PASantinoC  [address] [place] [address]        Thank you for allowing me to participate in the care of your patient.      Sincerely,     JESSE Leigh [facility]  "

## 2023-12-17 DIAGNOSIS — J45.30 MILD PERSISTENT ASTHMA WITHOUT COMPLICATION: ICD-10-CM

## 2023-12-17 DIAGNOSIS — I10 ESSENTIAL HYPERTENSION: ICD-10-CM

## 2023-12-18 RX ORDER — FLUTICASONE PROPIONATE AND SALMETEROL 500; 50 UG/1; UG/1
1 POWDER RESPIRATORY (INHALATION) 2 TIMES DAILY
Qty: 180 EACH | Refills: 3 | Status: SHIPPED | OUTPATIENT
Start: 2023-12-18 | End: 2024-03-06

## 2023-12-18 RX ORDER — LABETALOL 100 MG/1
TABLET, FILM COATED ORAL
Qty: 180 TABLET | Refills: 0 | Status: SHIPPED | OUTPATIENT
Start: 2023-12-18 | End: 2024-03-06

## 2024-03-06 ENCOUNTER — OFFICE VISIT (OUTPATIENT)
Dept: INTERNAL MEDICINE | Facility: CLINIC | Age: 84
End: 2024-03-06
Payer: COMMERCIAL

## 2024-03-06 VITALS
HEART RATE: 73 BPM | BODY MASS INDEX: 23.3 KG/M2 | SYSTOLIC BLOOD PRESSURE: 115 MMHG | HEIGHT: 61 IN | DIASTOLIC BLOOD PRESSURE: 60 MMHG | WEIGHT: 123.4 LBS | RESPIRATION RATE: 16 BRPM | OXYGEN SATURATION: 98 % | TEMPERATURE: 97.4 F

## 2024-03-06 DIAGNOSIS — K21.9 GASTROESOPHAGEAL REFLUX DISEASE WITHOUT ESOPHAGITIS: ICD-10-CM

## 2024-03-06 DIAGNOSIS — I35.0 AORTIC VALVE STENOSIS, ETIOLOGY OF CARDIAC VALVE DISEASE UNSPECIFIED: ICD-10-CM

## 2024-03-06 DIAGNOSIS — I25.10 CORONARY ARTERY DISEASE INVOLVING NATIVE CORONARY ARTERY OF NATIVE HEART WITHOUT ANGINA PECTORIS: ICD-10-CM

## 2024-03-06 DIAGNOSIS — Z00.00 MEDICARE ANNUAL WELLNESS VISIT, SUBSEQUENT: ICD-10-CM

## 2024-03-06 DIAGNOSIS — E11.22 TYPE 2 DIABETES MELLITUS WITH STAGE 3A CHRONIC KIDNEY DISEASE, WITHOUT LONG-TERM CURRENT USE OF INSULIN (H): ICD-10-CM

## 2024-03-06 DIAGNOSIS — I10 ESSENTIAL HYPERTENSION: ICD-10-CM

## 2024-03-06 DIAGNOSIS — N18.31 TYPE 2 DIABETES MELLITUS WITH STAGE 3A CHRONIC KIDNEY DISEASE, WITHOUT LONG-TERM CURRENT USE OF INSULIN (H): ICD-10-CM

## 2024-03-06 DIAGNOSIS — M48.062 SPINAL STENOSIS OF LUMBAR REGION WITH NEUROGENIC CLAUDICATION: ICD-10-CM

## 2024-03-06 DIAGNOSIS — E78.5 HYPERLIPIDEMIA LDL GOAL <70: ICD-10-CM

## 2024-03-06 DIAGNOSIS — Z12.31 VISIT FOR SCREENING MAMMOGRAM: ICD-10-CM

## 2024-03-06 DIAGNOSIS — J45.20 MILD INTERMITTENT ASTHMA WITHOUT COMPLICATION: ICD-10-CM

## 2024-03-06 DIAGNOSIS — Z78.0 ASYMPTOMATIC MENOPAUSAL STATE: ICD-10-CM

## 2024-03-06 DIAGNOSIS — N18.31 STAGE 3A CHRONIC KIDNEY DISEASE (H): ICD-10-CM

## 2024-03-06 DIAGNOSIS — I27.20 PULMONARY HYPERTENSION (H): ICD-10-CM

## 2024-03-06 LAB — HBA1C MFR BLD: 7 % (ref 0–5.6)

## 2024-03-06 PROCEDURE — G0439 PPPS, SUBSEQ VISIT: HCPCS | Performed by: INTERNAL MEDICINE

## 2024-03-06 PROCEDURE — 82570 ASSAY OF URINE CREATININE: CPT | Performed by: INTERNAL MEDICINE

## 2024-03-06 PROCEDURE — 80061 LIPID PANEL: CPT | Performed by: INTERNAL MEDICINE

## 2024-03-06 PROCEDURE — 83036 HEMOGLOBIN GLYCOSYLATED A1C: CPT | Performed by: INTERNAL MEDICINE

## 2024-03-06 PROCEDURE — 82043 UR ALBUMIN QUANTITATIVE: CPT | Performed by: INTERNAL MEDICINE

## 2024-03-06 PROCEDURE — 36415 COLL VENOUS BLD VENIPUNCTURE: CPT | Performed by: INTERNAL MEDICINE

## 2024-03-06 PROCEDURE — 99214 OFFICE O/P EST MOD 30 MIN: CPT | Mod: 25 | Performed by: INTERNAL MEDICINE

## 2024-03-06 PROCEDURE — 80053 COMPREHEN METABOLIC PANEL: CPT | Performed by: INTERNAL MEDICINE

## 2024-03-06 RX ORDER — LABETALOL 100 MG/1
100 TABLET, FILM COATED ORAL 2 TIMES DAILY
Qty: 180 TABLET | Refills: 0 | Status: SHIPPED | OUTPATIENT
Start: 2024-03-06 | End: 2024-05-29

## 2024-03-06 RX ORDER — EZETIMIBE 10 MG/1
10 TABLET ORAL DAILY
Qty: 90 TABLET | Refills: 3 | Status: SHIPPED | OUTPATIENT
Start: 2024-03-06

## 2024-03-06 RX ORDER — ROSUVASTATIN CALCIUM 40 MG/1
40 TABLET, COATED ORAL DAILY
Qty: 90 TABLET | Refills: 3 | Status: SHIPPED | OUTPATIENT
Start: 2024-03-06

## 2024-03-06 RX ORDER — TIZANIDINE 2 MG/1
2 TABLET ORAL 3 TIMES DAILY PRN
Qty: 90 TABLET | Refills: 1 | Status: CANCELLED | OUTPATIENT
Start: 2024-03-06

## 2024-03-06 RX ORDER — LOSARTAN POTASSIUM 100 MG/1
100 TABLET ORAL DAILY
Qty: 90 TABLET | Refills: 3 | Status: SHIPPED | OUTPATIENT
Start: 2024-03-06

## 2024-03-06 RX ORDER — OMEPRAZOLE 40 MG/1
40 CAPSULE, DELAYED RELEASE ORAL DAILY
Qty: 90 CAPSULE | Refills: 3 | Status: SHIPPED | OUTPATIENT
Start: 2024-03-06

## 2024-03-06 RX ORDER — HYDRALAZINE HYDROCHLORIDE 50 MG/1
50 TABLET, FILM COATED ORAL 3 TIMES DAILY
Qty: 270 TABLET | Refills: 3 | Status: SHIPPED | OUTPATIENT
Start: 2024-03-06

## 2024-03-06 RX ORDER — DILTIAZEM HYDROCHLORIDE 360 MG/1
360 CAPSULE, EXTENDED RELEASE ORAL DAILY
Qty: 90 CAPSULE | Refills: 3 | Status: SHIPPED | OUTPATIENT
Start: 2024-03-06

## 2024-03-06 RX ORDER — ALBUTEROL SULFATE 90 UG/1
2 AEROSOL, METERED RESPIRATORY (INHALATION) EVERY 6 HOURS PRN
Qty: 18 G | Refills: 11 | Status: SHIPPED | OUTPATIENT
Start: 2024-03-06

## 2024-03-06 RX ORDER — FLUTICASONE PROPIONATE AND SALMETEROL 500; 50 UG/1; UG/1
1 POWDER RESPIRATORY (INHALATION) EVERY 12 HOURS
Status: CANCELLED | OUTPATIENT
Start: 2024-03-06

## 2024-03-06 SDOH — HEALTH STABILITY: PHYSICAL HEALTH: ON AVERAGE, HOW MANY DAYS PER WEEK DO YOU ENGAGE IN MODERATE TO STRENUOUS EXERCISE (LIKE A BRISK WALK)?: 3 DAYS

## 2024-03-06 ASSESSMENT — ASTHMA QUESTIONNAIRES
ACT_TOTALSCORE: 25
QUESTION_4 LAST FOUR WEEKS HOW OFTEN HAVE YOU USED YOUR RESCUE INHALER OR NEBULIZER MEDICATION (SUCH AS ALBUTEROL): NOT AT ALL
QUESTION_2 LAST FOUR WEEKS HOW OFTEN HAVE YOU HAD SHORTNESS OF BREATH: NOT AT ALL
QUESTION_1 LAST FOUR WEEKS HOW MUCH OF THE TIME DID YOUR ASTHMA KEEP YOU FROM GETTING AS MUCH DONE AT WORK, SCHOOL OR AT HOME: NONE OF THE TIME
QUESTION_3 LAST FOUR WEEKS HOW OFTEN DID YOUR ASTHMA SYMPTOMS (WHEEZING, COUGHING, SHORTNESS OF BREATH, CHEST TIGHTNESS OR PAIN) WAKE YOU UP AT NIGHT OR EARLIER THAN USUAL IN THE MORNING: NOT AT ALL
QUESTION_5 LAST FOUR WEEKS HOW WOULD YOU RATE YOUR ASTHMA CONTROL: COMPLETELY CONTROLLED
ACT_TOTALSCORE: 25

## 2024-03-06 ASSESSMENT — SOCIAL DETERMINANTS OF HEALTH (SDOH): HOW OFTEN DO YOU GET TOGETHER WITH FRIENDS OR RELATIVES?: ONCE A WEEK

## 2024-03-06 NOTE — PATIENT INSTRUCTIONS
Continue current medications  Prescriptions refilled.    Labs today as ordered  Bone density (DEXA)   and Mammogram     This will be done at the [facility]. Call [phone] or use [employer] to schedule.   Schedule an eye exam appointment with  [facility] [phone]. Please ask the eye clinic to fax us a report of your eye exam to [phone], attention Dr Snow  Get Shingrix vaccinations at your pharmacy (now Medicare covered). This is a 2 shot series done 2-6 months apart   Would recommend RSV vaccination. Get at a pharmacy. 1st priority   ECHO with heart clinic in October 2024 for annual check. Call [phone] to schedule   Use  OTC Debrox  and irrigation as directed for left ear wax  Pt was informed regarding extra E&M billing for management of new or established medical issues not related to today's wellness/screening visit

## 2024-03-06 NOTE — PROGRESS NOTES
Preventive Care Visit  [facility]  Jose De Jesus Snow MD, Internal Medicine  Mar 6, 2024      ASSESSMENT:    1. Medicare annual wellness visit, subsequent  See plan discussion below for healthcare maintenance recommendations.  Patient declines flu or COVID vaccination.  Due for eye exam. Will use Debrox OTC for left ear cerumen    2. Stage 3a chronic kidney disease (H)  Previous GFR 47.  Needs lab follow-up  - Albumin Random Urine Quantitative with Creat Ratio; Future  - Comprehensive metabolic panel (BMP + Alb, Alk Phos, ALT, AST, Total. Bili, TP); Future  - Albumin Random Urine Quantitative with Creat Ratio  - Comprehensive metabolic panel (BMP + Alb, Alk Phos, ALT, AST, Total. Bili, TP)    3. Spinal stenosis of lumbar region with neurogenic claudication  Working with [facility] in [place]. Being evaluated for possible RFA    4. Hyperlipidemia LDL goal <70  On statin and Zetia.  Previous LDL minimally above goal.  Continue current medication.  Labs as ordered  - rosuvastatin (CRESTOR) 40 MG tablet; Take 1 tablet (40 mg) by mouth daily  Dispense: 90 tablet; Refill: 3  - ezetimibe (ZETIA) 10 MG tablet; Take 1 tablet (10 mg) by mouth daily  Dispense: 90 tablet; Refill: 3  - Comprehensive metabolic panel (BMP + Alb, Alk Phos, ALT, AST, Total. Bili, TP); Future  - Comprehensive metabolic panel (BMP + Alb, Alk Phos, ALT, AST, Total. Bili, TP)  - Lipid panel reflex to direct LDL Non-fasting; Future  - Lipid panel reflex to direct LDL Non-fasting    5. Gastroesophageal reflux disease without esophagitis  Controlled.  Has recurrence of symptoms off of PPI therapy.  Continue omeprazole  - omeprazole (PRILOSEC) 40 MG DR capsule; Take 1 capsule (40 mg) by mouth daily  Dispense: 90 capsule; Refill: 3    6. Essential hypertension  Controlled.  Continue current medication  - losartan (COZAAR) 100 MG tablet; Take 1 tablet (100 mg) by mouth daily  Dispense: 90 tablet; Refill: 3  - labetalol (NORMODYNE) 100 MG tablet;  Take 1 tablet (100 mg) by mouth 2 times daily  Dispense: 180 tablet; Refill: 0  - hydrALAZINE (APRESOLINE) 50 MG tablet; Take 1 tablet (50 mg) by mouth 3 times daily  Dispense: 270 tablet; Refill: 3  - diltiazem ER COATED BEADS (CARDIZEM CD) 360 MG 24 hr capsule; Take 1 capsule (360 mg) by mouth daily  Dispense: 90 capsule; Refill: 3  - Comprehensive metabolic panel (BMP + Alb, Alk Phos, ALT, AST, Total. Bili, TP); Future  - Comprehensive metabolic panel (BMP + Alb, Alk Phos, ALT, AST, Total. Bili, TP)    7. Mild intermittent asthma without complication  Controlled.  ACT =25.  Continue albuterol as needed  - albuterol (PROAIR HFA/PROVENTIL HFA/VENTOLIN HFA) 108 (90 Base) MCG/ACT inhaler; Inhale 2 puffs into the lungs every 6 hours as needed for shortness of breath, wheezing or cough  Dispense: 18 g; Refill: 11    8. Coronary artery disease involving native coronary artery of native heart without angina pectoris  Controlled.  Continue medical management.  Lipid follow-up as below  - Lipid panel reflex to direct LDL Non-fasting; Future  - Lipid panel reflex to direct LDL Non-fasting    9. Type 2 diabetes mellitus with stage 3a chronic kidney disease, without long-term current use of insulin (H)  Previously controlled with A1c 6.9.  Due for lab follow-up.  Continue current management pending lab result  - HEMOGLOBIN A1C; Future  - Comprehensive metabolic panel (BMP + Alb, Alk Phos, ALT, AST, Total. Bili, TP); Future  - HEMOGLOBIN A1C  - Comprehensive metabolic panel (BMP + Alb, Alk Phos, ALT, AST, Total. Bili, TP)    10. Aortic valve stenosis, etiology of cardiac valve disease unspecified  Moderate aortic stenosis.   Will repeat echo Fall 2024 as ordered by cardiology    11. Visit for screening mammogram  Wishes to continue breast cancer screening.  Asymptomatic.  Future mammogram was ordered  - MA SCREENING DIGITAL BILAT - Future  (s+30); Future    12. Asymptomatic menopausal state  Previous osteopenia not requiring  prescription therapy 2021.  Due for follow-up DEXA  - DX Hip/Pelvis/Spine; Future     13. Pulmonary hypertension (H)   Sx of .shortness of breath improved more recently. Has repeat ECHO  ordered for Fall 2024. If worsening in future,cardiology consider RHC but not indicated at this time    PLAN:  Continue current medications  Prescriptions refilled.    Labs today as ordered  Bone density (DEXA)   and Mammogram     This will be done at the [facility]. Call [phone] or use [employer] to schedule.   Schedule an eye exam appointment with  [facility] [phone]. Please ask the eye clinic to fax us a report of your eye exam to [phone], attention Dr Snow  Get Shingrix vaccinations at your pharmacy (now Medicare covered). This is a 2 shot series done 2-6 months apart   Would recommend RSV vaccination. Get at a pharmacy. 1st priority   ECHO with heart clinic in October 2024 for annual check. Call [phone] to schedule   Use  OTC Debrox  and irrigation as directed for left ear wax  Pt was informed regarding extra E&M billing for management of new or established medical issues not related to today's wellness/screening visit           Wilton Archibald is a 83 year old, presenting for the following:  Physical (Not fasting)  And follow-up regarding multiple medical issues as above       Via the Health Maintenance questionnaire, the patient has reported the following services have been completed -Mammogram, this information has been sent to the abstraction team.  Health Care Directive  Patient does not have a Health Care Directive or Living Will: Advance Directive received and scanned. Click on Code in the patient header to view.    HPI         3/6/2024   General Health   How would you rate your overall physical health? Good   Feel stress (tense, anxious, or unable to sleep) Not at all         3/6/2024   Nutrition   Diet: Regular (no restrictions)         3/6/2024   Exercise   Days per week of  moderate/strenous exercise 3 days         3/6/2024   Social Factors   Frequency of gathering with friends or relatives Once a week   Worry food won't last until get money to buy more No   Food not last or not have enough money for food? No   Do you have housing?  Yes   Are you worried about losing your housing? No   Lack of transportation? No   Unable to get utilities (heat,electricity)? No         3/16/2023   Fall Risk   Fallen 2 or more times in the past year? No          3/6/2024   Activities of Daily Living- Home Safety   Needs help with the following daily activites None of the above   Safety concerns in the home None of the above         3/6/2024   Dental   Dentist two times every year? (!) NO         3/6/2024   Hearing Screening   Hearing concerns? None of the above         3/6/2024   Driving Risk Screening   Patient/family members have concerns about driving No         3/6/2024   General Alertness/Fatigue Screening   Have you been more tired than usual lately? No         3/6/2024   Urinary Incontinence Screening   Bothered by leaking urine in past 6 months No            Today's PHQ-2 Score:       3/6/2024     1:12 PM   PHQ-2 ( 1999 [employer])   Q1: Little interest or pleasure in doing things 0   Q2: Feeling down, depressed or hopeless 0   PHQ-2 Score 0   Q1: Little interest or pleasure in doing things Not at all   Q2: Feeling down, depressed or hopeless Not at all   PHQ-2 Score 0           3/6/2024   Substance Use   Alcohol more than 3/day or more than 7/wk No   Do you have a current opioid prescription? No   How severe/bad is pain from 1 to 10? 4/10   Do you use any other substances recreationally? No     Social History     Tobacco Use    Smoking status: Never    Smokeless tobacco: Never   Vaping Use    Vaping Use: Never used   Substance Use Topics    Alcohol use: Yes     Comment: 1-2 drinks week    Drug use: No          7/14/2022   LAST FHS-7 RESULTS   1st degree relative breast or ovarian cancer No   Any  relative bilateral breast cancer No   Any male have breast cancer No   Any ONE woman have BOTH breast AND ovarian cancer No   Any woman with breast cancer before 50yrs No   2 or more relatives with breast AND/OR ovarian cancer No   2 or more relatives with breast AND/OR bowel cancer No        Mammogram Screening - After age 74- determine frequency with patient based on health status, life expectancy and patient goals           Current providers sharing in care for this patient include:  Patient Care Team:  Jose De Jesus Snow MD as PCP - General (Internal Medicine)  Jose De Jesus Snow MD as Assigned PCP  Maricarmen Izaguirre APRN CNP as Assigned Heart and Vascular Provider    The following health maintenance items are reviewed in Epic and correct as of today:  Health Maintenance   Topic Date Due    ZOSTER IMMUNIZATION (1 of 2) Never done    RSV VACCINE (Pregnancy & 60+) (1 - 1-dose 60+ series) Never done    EYE EXAM  01/30/2014    ADVANCE CARE PLANNING  08/04/2016    MEDICARE ANNUAL WELLNESS VISIT  04/16/2020    DIABETIC FOOT EXAM  04/16/2020    ASTHMA ACTION PLAN  04/16/2020    MICROALBUMIN  12/21/2022    ANNUAL REVIEW OF HM ORDERS  02/08/2023    HEMOGLOBIN  02/08/2023    MAMMO SCREENING  07/14/2023    INFLUENZA VACCINE (1) 09/01/2023    COVID-19 Vaccine (4 - 2023-24 season) 09/01/2023    LIPID  09/30/2023    A1C  03/16/2024    BMP  03/16/2024    CREATININE  03/16/2024    FALL RISK ASSESSMENT  03/16/2024    ASTHMA CONTROL TEST  09/06/2024    DTAP/TDAP/TD IMMUNIZATION (4 - Td or Tdap) 04/03/2028    DEXA  03/01/2036    PHQ-2 (once per calendar year)  Completed    Pneumococcal Vaccine: 65+ Years  Completed    URINALYSIS  Completed    IPV IMMUNIZATION  Aged Out    HPV IMMUNIZATION  Aged Out    MENINGITIS IMMUNIZATION  Aged Out    RSV MONOCLONAL ANTIBODY  Aged Out    COLORECTAL CANCER SCREENING  Discontinued         Review of Systems  CONSTITUTIONAL: NEGATIVE for fever, chills.  Weight down 3 pounds  INTEGUMENTARY/SKIN: NEGATIVE for  "worrisome rashes, moles or lesions Mild dryness skin of feet  EYES: NEGATIVE for vision changes or irritation  ENT/MOUTH: NEGATIVE for ear, mouth and throat problems  RESP: NEGATIVE for significant cough or SOB. Has not used inhalers in 6 mos. ACT = 25  BREAST: NEGATIVE for masses, tenderness or discharge  CV: NEGATIVE for chest pain, palpitations or peripheral edema. Hx moderate aortic stenosis and due for ECHO again October 2024. Hx Pulm HTN per ECHO but breathing has been better recently  GI: NEGATIVE for nausea, abdominal pain, heartburn, or change in bowel habits  : NEGATIVE for frequency, dysuria, or hematuria  MUSCULOSKELETAL:  POSITIVE for chronic LBP without radiation. Working with [facility] in [place]. Being evaluated for possible RFA  NEURO: NEGATIVE for weakness, dizziness or paresthesias  ENDOCRINE: NEGATIVE for temperature intolerance. Not checking sugars  HEME: NEGATIVE for bleeding problems  PSYCHIATRIC: NEGATIVE for changes in mood or affect     Objective    Exam  /60 (BP Location: Left arm, Patient Position: Sitting, Cuff Size: Adult Regular)   Pulse 73   Temp 97.4  F (36.3  C) (Temporal)   Resp 16   Ht 1.549 m (5' 1\")   Wt 56 kg (123 lb 6.4 oz)   SpO2 98%   BMI 23.32 kg/m     Estimated body mass index is 23.32 kg/m  as calculated from the following:    Height as of this encounter: 1.549 m (5' 1\").    Weight as of this encounter: 56 kg (123 lb 6.4 oz).    Physical Exam  General appearance -  alert, no distress  Skin - No rashes or lesions.  Head - normocephalic, atraumatic  Eyes - RADAMES, EOMI, fundi exam with nondilated pupils negative.  Ears - External ears normal. Canal  clear right, Cerumen deep in left canal obstructing TM left . TM normal right.  Nose/Sinuses - Nares normal. Septum midline. Mucosa normal. No drainage or sinus tenderness.  Oropharynx - No erythema, no adenopathy, no exudates.  Neck - Supple without adenopathy or thyromegaly. No bruits.  Lungs - Clear to auscultation " without wheezes/rhonchi.  Heart - Regular rate and rhythm without murmurs, clicks, or gallops.  Nodes - No supraclavicular, axillary, or inguinal adenopathy palpable.  Breasts - deferred  Abdomen - Abdomen soft, non-tender. BS normal. No masses or hepatosplenomegaly palpable. No bruits.  Extremities -No cyanosis, clubbing or edema.    Musculoskeletal - Spine ROM normal. Muscular strength intact.  DIP  with osteoarthritis thickening. Mild tenderness bilateral paralumbar injection. Bilateral bunion 1st MTP without erythema  Peripheral pulses - radial=4/4, femoral=4/4, posterior tibial=4/4, dorsalis pedis=4/4,  Neuro - Gait normal. Reflexes normal and symmetric. Sensation grossly WNL.  Genital/Rectal - deferred           3/6/2024   Mini Cog   Clock Draw Score 2 Normal   3 Item Recall 3 objects recalled   Mini Cog Total Score 5           Signed Electronically by: Jose De Jesus Snow MD

## 2024-03-07 LAB
ALBUMIN SERPL BCG-MCNC: 4.2 G/DL (ref 3.5–5.2)
ALP SERPL-CCNC: 81 U/L (ref 40–150)
ALT SERPL W P-5'-P-CCNC: 11 U/L (ref 0–50)
ANION GAP SERPL CALCULATED.3IONS-SCNC: 12 MMOL/L (ref 7–15)
AST SERPL W P-5'-P-CCNC: 16 U/L (ref 0–45)
BILIRUB SERPL-MCNC: 0.3 MG/DL
BUN SERPL-MCNC: 17.6 MG/DL (ref 8–23)
CALCIUM SERPL-MCNC: 8.9 MG/DL (ref 8.8–10.2)
CHLORIDE SERPL-SCNC: 109 MMOL/L (ref 98–107)
CHOLEST SERPL-MCNC: 186 MG/DL
CREAT SERPL-MCNC: 1.05 MG/DL (ref 0.51–0.95)
CREAT UR-MCNC: 135 MG/DL
DEPRECATED HCO3 PLAS-SCNC: 20 MMOL/L (ref 22–29)
EGFRCR SERPLBLD CKD-EPI 2021: 53 ML/MIN/1.73M2
FASTING STATUS PATIENT QL REPORTED: NO
GLUCOSE SERPL-MCNC: 135 MG/DL (ref 70–99)
HDLC SERPL-MCNC: 74 MG/DL
LDLC SERPL CALC-MCNC: 90 MG/DL
MICROALBUMIN UR-MCNC: 17.8 MG/L
MICROALBUMIN/CREAT UR: 13.19 MG/G CR (ref 0–25)
NONHDLC SERPL-MCNC: 112 MG/DL
POTASSIUM SERPL-SCNC: 3.9 MMOL/L (ref 3.4–5.3)
PROT SERPL-MCNC: 6.1 G/DL (ref 6.4–8.3)
SODIUM SERPL-SCNC: 141 MMOL/L (ref 135–145)
TRIGL SERPL-MCNC: 111 MG/DL

## 2024-05-29 DIAGNOSIS — I10 ESSENTIAL HYPERTENSION: ICD-10-CM

## 2024-05-29 RX ORDER — LABETALOL 100 MG/1
100 TABLET, FILM COATED ORAL 2 TIMES DAILY
Qty: 180 TABLET | Refills: 2 | Status: SHIPPED | OUTPATIENT
Start: 2024-05-29

## 2024-07-25 ENCOUNTER — ANCILLARY PROCEDURE (OUTPATIENT)
Dept: MAMMOGRAPHY | Facility: CLINIC | Age: 84
End: 2024-07-25
Attending: INTERNAL MEDICINE
Payer: COMMERCIAL

## 2024-07-25 DIAGNOSIS — Z12.31 VISIT FOR SCREENING MAMMOGRAM: ICD-10-CM

## 2024-07-25 PROCEDURE — 77067 SCR MAMMO BI INCL CAD: CPT | Mod: TC | Performed by: STUDENT IN AN ORGANIZED HEALTH CARE EDUCATION/TRAINING PROGRAM

## 2024-07-25 PROCEDURE — 77063 BREAST TOMOSYNTHESIS BI: CPT | Mod: TC | Performed by: STUDENT IN AN ORGANIZED HEALTH CARE EDUCATION/TRAINING PROGRAM

## 2024-08-28 ENCOUNTER — OFFICE VISIT (OUTPATIENT)
Dept: ORTHOPEDICS | Facility: CLINIC | Age: 84
End: 2024-08-28
Payer: COMMERCIAL

## 2024-08-28 VITALS
OXYGEN SATURATION: 98 % | WEIGHT: 120 LBS | SYSTOLIC BLOOD PRESSURE: 146 MMHG | BODY MASS INDEX: 22.08 KG/M2 | DIASTOLIC BLOOD PRESSURE: 67 MMHG | HEART RATE: 74 BPM | HEIGHT: 62 IN

## 2024-08-28 DIAGNOSIS — M47.817 FACET ARTHROPATHY, LUMBOSACRAL: ICD-10-CM

## 2024-08-28 DIAGNOSIS — M48.07 LUMBOSACRAL STENOSIS WITH NEUROGENIC CLAUDICATION: Primary | ICD-10-CM

## 2024-08-28 DIAGNOSIS — M51.379 DDD (DEGENERATIVE DISC DISEASE), LUMBOSACRAL: ICD-10-CM

## 2024-08-28 DIAGNOSIS — M41.9 SCOLIOSIS OF THORACOLUMBAR SPINE, UNSPECIFIED SCOLIOSIS TYPE: ICD-10-CM

## 2024-08-28 PROCEDURE — 99417 PROLNG OP E/M EACH 15 MIN: CPT | Performed by: PHYSICAL MEDICINE & REHABILITATION

## 2024-08-28 PROCEDURE — 99215 OFFICE O/P EST HI 40 MIN: CPT | Performed by: PHYSICAL MEDICINE & REHABILITATION

## 2024-08-28 ASSESSMENT — PAIN SCALES - GENERAL: PAINLEVEL: MODERATE PAIN (4)

## 2024-08-28 NOTE — PROGRESS NOTES
PHYSICAL MEDICINE & REHABILITATION / MEDICAL SPINE        Date:  Aug 28, 2024    Name:  Cristela Salgado  YOB: 1940  MRN:  [id number]          CHART REVIEW:  Reviewed 06/08/2022 note from Dipesh Méndez MD (neurosurgery).  Ms. Cristela Salgado had back and leg pain beginning in February 2022.  Pain began when she was walking up stairs, and Ms. Cristela Salgado's right leg went numb, and she was unable to walk anymore.  Ms. Cristela Salgado would occasionally get pain into her left leg.  Pain was worse with turning over in bed and getting up initially.  Pain was worse with transitioning from sitting to standing.  Ms. Cristela Salgado was started on gabapentin and tizanidine.  She was referred to physical therapy.  She was referred to medical spine.  If she were to fail conservative management, surgery would require a multilevel lumbar fusion procedure.         CHIEF COMPLAINT:  Left low back/buttock pain.        HISTORY OF PRESENT ILLNESS:  Ms. Cristela Salgado is an 83-year-old, right-hand-dominant, female.  She has 3 children, 6 grandchildren, and 1 great grandchild.     On 07/18/2022, Ms. Cristela Salgado had a fluoroscopic guided right paramedian S1-S2 interlaminar epidural corticosteroid injection.  On 08/12/2022, Ms. Cristela Salgado reported she was no longer have any pain in her right lower extremity.    On 08/29/2022, Ms. Cristela Salgado had medial branch blocks of the bilateral L3-L4 and L4-L5 facet joints.  Ms. Cristela Salgado returns to clinic today, 08/28/2024.    Ms. Cristela Salgado states that she did not notice any benefit from the medial branch blocks.    Ms. Cristela Salgado notes left low back and buttock pain that extends to her midline.  There does not seem to be pain in her right low back.  This has been worse in the last 3 to 4 months.  There is some radiation of the pain into her left thigh.  Pain is variable in nature and intermittent.  Ms. Cristela Salgado sleeps on her  sides and does not notice any pain while she is sleeping.  She notes some alleviation with ice.  Her pain worsens with walking and standing.    Ms. Cristela Salgado states that her bilateral lower extremities become weak and tired with walking.  She notes numbness in her bilateral lower extremities with walking.  She does not note these symptoms with standing.  Ms. Cristela Salgado does note improvement in these symptoms with leaning forward on a shopping cart.    Ms. Cristela Salgado currently rates her pain as 4/10.  Her pain varies from 1/10 to 9/10.  She has not tried acupuncture, chiropractor treatments, massage.  She did not notice any benefit with physical therapy.  Ms. Cristela Salgado has tried acetaminophen, ibuprofen, naproxen, and aspirin.    Ms. Cristela Salgado's left low back/buttock pain does not change with coughing, sneezing, driving and hitting a pothole.  Her pain does keep her awake but does not wake her.    Ms. Cristela Salgado notes weakness in her bilateral lower extremities with standing and walking.  She denies any catching, locking, popping.  Ms. Cristela Salgado denies any bruising, redness, swelling.  She has not had any giveway episodes of her lower extremities, but she feels like her lower extremities could give way when she is walking.  Ms. Cristela Salgado denies any tripping, stumbling, falling.  She has used a four-wheel walker when [place] the airport.  Ms. Cristela Salgado does note some numbness, tingling, pins-and-needles in her left low back and buttock.  She also notes numbness, tingling, pins-and-needles in her bilateral lower extremities with walking.  Ms. Cristela Salgado denies any other numbness, tingling, pins-and-needles.  Ms. Cristela Salgado denies any saddle anesthesia, bowel incontinence, bladder incontinence.    Ms. Cristela Salgado denies any prior or recent injuries of her mid back, low back, pelvis, hips, thighs, knees, legs, ankles, feet, toes.    Ms.  Cristela Salgado denies any personal or family history of autoimmune diseases, rheumatologic diseases, gout, pseudogout.  She denies any personal or family history of neurologic diseases.  Ms. Cristela Salgado denies any personal or family history of inflammatory bowel diseases.        ALLERGIES:  Allergies   Allergen Reactions    Lisinopril Cough    Hydrochlorothiazide      Renal insufficiency    Iodine Hives     Iodine contrast    Metformin      Loose stools    Niacin      LEG CRAMPS    Norvasc [Amlodipine Besylate] Swelling    Simvastatin      ACHINESS           MEDICATIONS:  Current Outpatient Medications   Medication Sig Dispense Refill    albuterol (PROAIR HFA/PROVENTIL HFA/VENTOLIN HFA) 108 (90 Base) MCG/ACT inhaler Inhale 2 puffs into the lungs every 6 hours as needed for shortness of breath, wheezing or cough 18 g 11    aspirin 81 MG tablet Take 1 tablet by mouth daily.      diltiazem ER COATED BEADS (CARDIZEM CD) 360 MG 24 hr capsule Take 1 capsule (360 mg) by mouth daily 90 capsule 3    ezetimibe (ZETIA) 10 MG tablet Take 1 tablet (10 mg) by mouth daily 90 tablet 3    hydrALAZINE (APRESOLINE) 50 MG tablet Take 1 tablet (50 mg) by mouth 3 times daily 270 tablet 3    labetalol (NORMODYNE) 100 MG tablet TAKE 1 TABLET BY MOUTH TWICE A  tablet 2    losartan (COZAAR) 100 MG tablet Take 1 tablet (100 mg) by mouth daily 90 tablet 3    omeprazole (PRILOSEC) 40 MG DR capsule Take 1 capsule (40 mg) by mouth daily 90 capsule 3    rosuvastatin (CRESTOR) 40 MG tablet Take 1 tablet (40 mg) by mouth daily 90 tablet 3    vitamin D3 (CHOLECALCIFEROL) 50 mcg (2000 units) tablet Take 1 tablet (50 mcg) by mouth daily           PAST MEDICAL HISTORY:  Past Medical History:   Diagnosis Date    CAD (coronary artery disease)      mild on CT angio    CKD (chronic kidney disease) stage 3, GFR 30-59 ml/min (H)     Colon polyps 2009    DDD (degenerative disc disease), lumbosacral 7/8/2022    Esophageal reflux 4/30/2008     Family history of ischemic heart disease     Fatigue 4/11/2013    Hiatal hernia 2009    History of blood transfusion 4/08    after hiatal hernia surgery    Hyperlipidemia LDL goal <70     Infection due to 2019 novel coronavirus 11/14/2022    Macular degeneration 1/3/2012    Mild persistent asthma     Nonrheumatic aortic valve stenosis 11/14/2022    Pulmonary hypertension (H) 8/20/2012    mild    Scoliosis     mild     Type 2 diabetes mellitus with diabetic chronic kidney disease  (goal A1C<8) 10/20/2015    Ulcer of the stomach and intestine 2009    Anthony's ulcer    Unspecified essential hypertension     Vitamin D deficiency 1/3/2012         PAST SURGICAL HISTORY:  Past Surgical History:   Procedure Laterality Date    ABDOMEN SURGERY  4/08    hiatal hernia    BREAST SURGERY  1974    biopsy-begign    CARDIAC SURGERY  1995    angiogram    COLONOSCOPY  2008    HERNIA REPAIR  2008    HYSTERECTOMY, IDRIS  1974    Fibroids    ZZC OPEN STOMACH,REPR ULCER,SUTURE  2009    Hiatal hernia, and Anthony's ulcer         FAMILY HISTORY:  Family History   Problem Relation Age of Onset    Heart Disease Mother         Rheumatic fever    Heart Disease Father         CAD, DM, PVD    C.A.D. Father         1st MI in 80s    Hypertension Sister         3 sisters, all with hypertension    Hypertension Sister     Dementia Sister     Family History Negative Son     Family History Negative Daughter     Family History Negative Daughter     C.A.D. Maternal Uncle         age 54 yrs, sudden cardiac    C.A.D. Maternal Uncle         age 55 yrs         SOCIAL HISTORY:  Social History     Socioeconomic History    Marital status:      Spouse name: Not on file    Number of children: 3    Years of education: Not on file    Highest education level: Not on file   Occupational History     Employer: RETIRED   Tobacco Use    Smoking status: Never    Smokeless tobacco: Never   Vaping Use    Vaping status: Never Used   Substance and Sexual Activity     Alcohol use: Yes     Comment: 1-2 drinks week    Drug use: No    Sexual activity: Yes     Partners: Male   Other Topics Concern    Parent/sibling w/ CABG, MI or angioplasty before 65F 55M? No     Service Not Asked    Blood Transfusions Not Asked    Caffeine Concern No     Comment: 1-2 cups daily    Occupational Exposure Not Asked    Hobby Hazards Not Asked    Sleep Concern No     Comment: nocturia    Stress Concern Not Asked    Weight Concern Not Asked    Special Diet Yes     Comment: low salt, limits sugar    Back Care Not Asked    Exercise No     Comment: walking, yardwork, limited due to plantar fascitis    Bike Helmet Not Asked    Seat Belt Not Asked    Self-Exams Not Asked   Social History Narrative    Not on file     Social Determinants of Health     Financial Resource Strain: Low Risk  (3/6/2024)    Financial Resource Strain     Within the past 12 months, have you or your family members you live with been unable to get utilities (heat, electricity) when it was really needed?: No   Food Insecurity: Low Risk  (3/6/2024)    Food Insecurity     Within the past 12 months, did you worry that your food would run out before you got money to buy more?: No     Within the past 12 months, did the food you bought just not last and you didn t have money to get more?: No   Transportation Needs: Low Risk  (3/6/2024)    Transportation Needs     Within the past 12 months, has lack of transportation kept you from medical appointments, getting your medicines, non-medical meetings or appointments, work, or from getting things that you need?: No   Physical Activity: Unknown (3/6/2024)    Exercise Vital Sign     Days of Exercise per Week: 3 days     Minutes of Exercise per Session: Not on file   Stress: No Stress Concern Present (3/6/2024)    [employer] - Occupational Stress Questionnaire     Feeling of Stress : Not at all   Social Connections: Unknown (3/6/2024)    Social Connection and  "Isolation Panel [NHANES]     Frequency of Communication with Friends and Family: Not on file     Frequency of Social Gatherings with Friends and Family: Once a week     Attends [employer] Services: Not on file     Active Member of Clubs or Organizations: Not on file     Attends Club or Organization Meetings: Not on file     Marital Status: Not on file   Interpersonal Safety: Low Risk  (3/6/2024)    Interpersonal Safety     Do you feel physically and emotionally safe where you currently live?: Yes     Within the past 12 months, have you been hit, slapped, kicked or otherwise physically hurt by someone?: No     Within the past 12 months, have you been humiliated or emotionally abused in other ways by your partner or ex-partner?: No   Housing Stability: Low Risk  (3/6/2024)    Housing Stability     Do you have housing? : Yes     Are you worried about losing your housing?: No         PHYSICAL EXAMINATION:  Vitals:    08/28/24 1408   BP: (!) 146/67   Pulse: 74   SpO2: 98%   Weight: 54.4 kg (120 lb)   Height: 1.575 m (5' 2\")       GENERAL:  No acute distress.  Pleasant and cooperative.   PSYCH:  Normal mood and affect.  HEAD:  Normocephalic.  SPEECH:  No dysarthria.  EYES:  No scleral icterus.  EARS:  Hearing is intact to spoken voice.  NOSE:  Midline, symmetric, no rhinorrhea.  LUNGS:  No respiratory distress.  No increased work of breathing.  VASCULAR/PULSES:  Dorsalis pedis:  Right 2+.  Left 2+.  LOWER EXTREMITIES: No clubbing, cyanosis, or edema bilaterally.    BALANCE AND GAIT: The patient has a reciprocal gait pattern without antalgia.  She stands and ambulates with a slight forward trunk lean.  She has a slightly shortened stride length and decreased pace.  She is able to toe walk and heel walk without difficulty.  She cannot tandem walk.  Double leg squat is performed with quadriceps dominant pattern and mild dynamic knee valgus bilaterally.    INSPECTION:  There is no erythema or ecchymosis of the low back.  There " is thoracic scoliosis convex right and lumbar scoliosis convex left.    LUMBOPELVIC PALPATION:  Lumbar Spinous Processes:  not tender.  Lumbar Paraspinals:  Right not tender.  Left mild tenderness L4-L5.  Posterior Superior Iliac Spine:  Right not tender.  Left not tender.  Superior Cluneal Nerves:  Right not tender.  Left mild tenderness.  Iliac Crest:  Right not tender.  Left not tender.  Sacroiliac Joint:  Right not tender.  Left not tender.  Hip External Rotators:  Right not tender.  Left not tender.  Ischial Tuberosity:  Right not tender.  Left not tender.  Greater Trochanter:  Right not tender.  Left not tender.  Coccyx:  not tender.    THORACOLUMBAR RANGE OF MOTION:  Forward flexion (85 ): Mildly reduced range of motion, does not cause pain, does not cause radiating pain.  Extension (30 ): Moderately reduced range of motion, increases left low back/buttock pain, does not cause radiating pain.  Lateral bending right (30 ): Moderately reduced range of motion, does not cause pain, does not cause radiating pain.  Lateral bending left (30 ):  Moderately reduced range of motion, increases left low back/buttock pain, does not cause radiating pain.  Twisting right with extension:  Moderately reduced range of motion, causes slight increase in left low back/buttock pain, does not cause radiating pain.  Twisting left with extension:  Moderately reduced range of motion, increases left low back/buttock pain, does not cause radiating pain.    SACROILIAC RANGE OF MOTION:  Standing flexion:  Right -.  Left -.  Seated flexion:  Right -.  Left -.  One-leg stork (Gillet):  Right -.  Left -.  Sacroiliac joint dysfunction:  Right -.  Left -.    HIP RANGE OF MOTION:  Flexion (110 ):  Right 110 .  Left 110 .  Extension (30 ):  Right 25 .  Left 25 .  External rotation (45 ):  Right 30 .  Left 30 .  Internal rotation (35 ):  Right 25 .  Left 35 .    KNEE RANGE OF MOTION:  Extension (0 ):  Right 0 .  Left 0 .  Flexion (135 ):  Right  140 .  Left 140 .    STRENGTH:  Hip flexion:  Right 5/5.  Left 5/5.  Hip abduction:  Right 5/5.  Left 5/5.  Hip external rotation:  Right 5/5.  Left 5/5.  Hip extension:  Right 5/5.  Left 5/5.  Knee extension:  Right 5/5.  Left 5/5.  Knee flexion:  Right 5/5.  Left 5/5.  Ankle dorsiflexion:  Right 5/5.  Left 5/5.  Great toe dorsiflexion:  Right 5/5.  Left 5/5.  Ankle plantar flexion:  Right 5/5.  Left 5/5.    SENSATION:  Intact to light touch along right L2, L3, L4, L5, S1, S2.  Intact to light touch along left L2, L3, L4, L5, S1, S2.    REFLEXES:  Patellar (L2-L4):  Right 1+.  Left 2+.  Medial hamstrings (L5-S1):  Right 2+.  Left 2+.  Achilles (S1-S2):  Right 1+.  Left 1+.  Babinski:  Right downgoing.  Left downgoing.  Forced ankle dorsiflexion (clonus):  Right 0 beats.  Left 0 beats.    LUMBOPELVIC SPECIAL TESTS:  Carolin finger:  Right -.  Left -.  Gapping / Distraction:  Right -.  Left -.  Log roll:  Right -.  Left -.  Straight-leg raise (Lasegue):  Right -.  Left -.  Crossed-straight leg raise:  Right -.  Left -.  Resisted straight leg raise:  Right -.  Left -.  FABERE (Kenji):  Right -.  Left -.  FADIR:  Right -.  Left -.  Hip scour:  Right -.  Left -.  Lateral sacral compression:  Right -.  Left -.  Clamshell:  Right -.  Left -.  Femoral nerve stretch:  Right -.  Left -.  Crossed femoral nerve stretch:  Right -.  Left -.  Ely s:  Right +.  Left +.  Yeoman s:  Right -.  Left -.  Midline sacral thrust:  Right -.  Left -.  Noble compression:  Right -.  Left -.        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  Careful attention to the numbering convention is recommended prior to any future percutaneous or surgical intervention. Nomenclature is based on twelve thoracic vertebral bodies and 12 paired ribs.   The first nonrib-bearing vertebral body will be labeled L1.     Sagittal balance is 2.9 cm positive.     Lumbar lordosis measures 8.8 degrees. . This was measured from the superior endplate of L1 to the inferior endplate of L5.     Thoracic kyphosis measures 50.9 degrees. . This was measured from the superior endplate of T1 to the inferior endplate of T12.     Dextroconvex scoliosis of the thoracic spine from the superior endplate of T8 to the inferior plate of T12 measuring 15.8 degrees. Levoconvex scoliosis of the lumbar spine from the superior plate of L1 to the inferior plate of L5 measuring 33.4 degrees.      Coronal balance is 1.1 cm to the left of the CSVL.     No focal consolidation or pleural effusion. Nonobstructive bowel gas pattern. Soft tissues unremarkable.          XR LUMBAR BENDING ONLY 2/3 VIEWS 6/8/2022 2:10 PM     INDICATION: Spinal stenosis of lumbar region with neurogenic claudication  COMPARISON: Scoliosis radiographs performed same day. MRI lumbar spine February 15, 2022.     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  This is different from the literature prior lumbar spine MRI February 15, 2022 Careful attention to the numbering convention is recommended prior to any future percutaneous or surgical intervention.     On flexion there is anterolisthesis of L5 on S1 measuring 5 mm with normal alignment on extension. The vertebral bodies of the lumbar spine otherwise have normal stature and alignment. Anterior marginal osteophytes L1/L2, L2/L3 and L3/L4. Multilevel degenerative facet arthropathy, most pronounced at L3/L4-L5/S1. Anterior marginal osteophytes at L1/L2 and L2/L3. Atherosclerotic vascular disease. Soft tissues otherwise unremarkable.          MRI OF THE LUMBAR SPINE WITHOUT CONTRAST February 15, 2022 3:21 PM      HISTORY: Low back pain with worsening right lower extremity radicular  pain.     TECHNIQUE: Multiplanar, multisequence MRI  images of the lumbar spine were acquired without IV contrast.     COMPARISON: None.     FINDINGS: There are five lumbar-type vertebrae for the purposes of this dictation.      Five lumbar type vertebral bodies. Normal vertebral body heights. 25 degree levoscoliosis apex at L2-L3. Mild Modic 1 marrow change in the L3-L4 apposing endplate on the right. The conus tip is identified at L1-L2. Visualized extraspinal soft tissues are within normal limits.     T12-L1: Moderate to severe disc height and T2 signal loss. Mild circumferential disc osteophyte complex. Mild bilateral facet arthropathy. No spinal canal or neural foraminal stenosis.      L1-L2: Moderate to severe disc height and T2 signal loss. Mild posterior annular bulge. Mild bilateral facet arthropathy. No spinal canal or neural foraminal stenosis.     L2-L3: Moderate to severe disc height and T2 signal loss. Mild circumferential disc osteophyte complex. Mild bilateral facet arthropathy and thickening of ligamenta flava. No spinal canal stenosis. No neural foraminal stenosis.     L3-L4: 9 mm leftward subluxation. Moderate disc height and T2 signal loss. Mild-to-moderate circumferential disc osteophyte complex. Mild bilateral facet arthropathy. Moderate to severe spinal canal stenosis. Severe right and mild to moderate left neural foraminal stenosis.      L4-L5: 2 mm degenerative anterolisthesis. Mild disc height loss. Moderate disc T2 signal loss. Mild posterior annular bulge and left foraminal disc osteophyte complex. Moderate bilateral facet arthropathy and thickening of ligamenta flava. Mild/moderate spinal canal stenosis in a trefoil configuration. No right neural foraminal stenosis. Mild left neural foraminal stenosis.     L5-S1: Normal disc height. Moderate disc T2 signal loss. Mild posterior annular bulge. Small caudally directed left central disc herniation. Mild bilateral facet arthropathy. Mild left lateral recess stenosis. No central spinal canal  stenosis. No right neural foraminal stenosis. Mild left neural foraminal stenosis.     IMPRESSION:  1.  25 degree levoscoliosis apex at L2-L3.  2.  9 mm L3-L4 leftward subluxation with moderate to severe spinal canal stenosis, severe right neural foraminal stenosis and mild to moderate left neural from stenosis.  3.  Mild/moderate L4-L5 spinal canal stenosis in a trefoil configuration.  4.  Mild left L5-S1 lateral recess stenosis.          ASSESSMENT/PLAN:  Ms. Cristela Salgado is an 83-year-old, right-hand-dominant, female.  S1 is a lumbarized vertebrae.  She has anterolisthesis of L5 on S1 with dynamic instability.  Ms. Cristela Salgado has thoracolumbar scoliosis, lumbosacral facet arthropathy, lumbosacral degenerative disc disease, lumbosacral stenosis with neurogenic claudication.  Discussed diagnoses, pathophysiologies, and treatment options with Ms. Cristela Salgado.  Discussed the options of doing nothing/living with it, physical therapy, chiropractic care, oral medications such as gabapentin and pregabalin, lumbosacral facet joint medial branch blocks with following radiofrequency ablations, lumbosacral interlaminar epidural corticosteroid injection, referral to neurosurgery.  Ms. Cristela Salgado states that gabapentin did not work for her in the past.  She will be set up for a left paramedian L5-S1 interlaminar epidural corticosteroid injection versus a left paramedian S1-S2 interlaminar epidural corticosteroid injection.  Ms. Cristela Salgado is follow-up in this clinic in 2 months.        Total Time on encounter:  61 minutes were spent on one more or more of the following:  discussion with patient, history, exam, coordinating care, treatment goals, record review, documenting clinical information, and/or data review.      Samir Mathis MD

## 2024-08-28 NOTE — LETTER
8/28/2024      Cristela Salgado  [address]  [facility] [id number]      Dear Colleague,    Thank you for referring your patient, Cristela Salgado, to the [facility]. Please see a copy of my visit note below.    PHYSICAL MEDICINE & REHABILITATION / MEDICAL SPINE        Date:  Aug 28, 2024    Name:  Cristela Salgado  YOB: 1940  MRN:  [id number]          CHART REVIEW:  Reviewed 06/08/2022 note from Dipesh Méndez MD (neurosurgery).  Ms. Cristela Salgado had back and leg pain beginning in February 2022.  Pain began when she was walking up stairs, and Ms. Cristela Salgado's right leg went numb, and she was unable to walk anymore.  Ms. Cristela Salgado would occasionally get pain into her left leg.  Pain was worse with turning over in bed and getting up initially.  Pain was worse with transitioning from sitting to standing.  Ms. Cristela Salgado was started on gabapentin and tizanidine.  She was referred to physical therapy.  She was referred to medical spine.  If she were to fail conservative management, surgery would require a multilevel lumbar fusion procedure.         CHIEF COMPLAINT:  Left low back/buttock pain.        HISTORY OF PRESENT ILLNESS:  Ms. Cristela Salgado is an 83-year-old, right-hand-dominant, female.  She has 3 children, 6 grandchildren, and 1 great grandchild.     On 07/18/2022, Ms. Cristela Salgado had a fluoroscopic guided right paramedian S1-S2 interlaminar epidural corticosteroid injection.  On 08/12/2022, Ms. Cristela Salgado reported she was no longer have any pain in her right lower extremity.    On 08/29/2022, Ms. Cristela Salgado had medial branch blocks of the bilateral L3-L4 and L4-L5 facet joints.  Ms. Cristela Salgado returns to clinic today, 08/28/2024.    Ms. Cristela Salgado states that she did not notice any benefit from the medial branch blocks.    Ms. Cristela Salgado notes left low back and buttock pain that extends to her  midline.  There does not seem to be pain in her right low back.  This has been worse in the last 3 to 4 months.  There is some radiation of the pain into her left thigh.  Pain is variable in nature and intermittent.  Ms. Cristela Salgado sleeps on her sides and does not notice any pain while she is sleeping.  She notes some alleviation with ice.  Her pain worsens with walking and standing.    Ms. Cristela Salgado states that her bilateral lower extremities become weak and tired with walking.  She notes numbness in her bilateral lower extremities with walking.  She does not note these symptoms with standing.  Ms. Cristela Salgado does note improvement in these symptoms with leaning forward on a shopping cart.    Ms. Cristela Salgado currently rates her pain as 4/10.  Her pain varies from 1/10 to 9/10.  She has not tried acupuncture, chiropractor treatments, massage.  She did not notice any benefit with physical therapy.  Ms. Cristela Salgado has tried acetaminophen, ibuprofen, naproxen, and aspirin.    Ms. Cristela Salgado's left low back/buttock pain does not change with coughing, sneezing, driving and hitting a pothole.  Her pain does keep her awake but does not wake her.    Ms. Cristela Salgado notes weakness in her bilateral lower extremities with standing and walking.  She denies any catching, locking, popping.  Ms. Cristela Salgado denies any bruising, redness, swelling.  She has not had any giveway episodes of her lower extremities, but she feels like her lower extremities could give way when she is walking.  Ms. Cristela Salgado denies any tripping, stumbling, falling.  She has used a four-wheel walker when [place] the airport.  Ms. Cristela Salgado does note some numbness, tingling, pins-and-needles in her left low back and buttock.  She also notes numbness, tingling, pins-and-needles in her bilateral lower extremities with walking.  Ms. Cristela Salgdao denies any other numbness, tingling,  pins-and-needles.  Ms. Cristela Salgado denies any saddle anesthesia, bowel incontinence, bladder incontinence.    Ms. Cristela Salgado denies any prior or recent injuries of her mid back, low back, pelvis, hips, thighs, knees, legs, ankles, feet, toes.    Ms. Cristela Salgado denies any personal or family history of autoimmune diseases, rheumatologic diseases, gout, pseudogout.  She denies any personal or family history of neurologic diseases.  Ms. Cristela Salgado denies any personal or family history of inflammatory bowel diseases.        ALLERGIES:  Allergies   Allergen Reactions     Lisinopril Cough     Hydrochlorothiazide      Renal insufficiency     Iodine Hives     Iodine contrast     Metformin      Loose stools     Niacin      LEG CRAMPS     Norvasc [Amlodipine Besylate] Swelling     Simvastatin      ACHINESS           MEDICATIONS:  Current Outpatient Medications   Medication Sig Dispense Refill     albuterol (PROAIR HFA/PROVENTIL HFA/VENTOLIN HFA) 108 (90 Base) MCG/ACT inhaler Inhale 2 puffs into the lungs every 6 hours as needed for shortness of breath, wheezing or cough 18 g 11     aspirin 81 MG tablet Take 1 tablet by mouth daily.       diltiazem ER COATED BEADS (CARDIZEM CD) 360 MG 24 hr capsule Take 1 capsule (360 mg) by mouth daily 90 capsule 3     ezetimibe (ZETIA) 10 MG tablet Take 1 tablet (10 mg) by mouth daily 90 tablet 3     hydrALAZINE (APRESOLINE) 50 MG tablet Take 1 tablet (50 mg) by mouth 3 times daily 270 tablet 3     labetalol (NORMODYNE) 100 MG tablet TAKE 1 TABLET BY MOUTH TWICE A  tablet 2     losartan (COZAAR) 100 MG tablet Take 1 tablet (100 mg) by mouth daily 90 tablet 3     omeprazole (PRILOSEC) 40 MG DR capsule Take 1 capsule (40 mg) by mouth daily 90 capsule 3     rosuvastatin (CRESTOR) 40 MG tablet Take 1 tablet (40 mg) by mouth daily 90 tablet 3     vitamin D3 (CHOLECALCIFEROL) 50 mcg (2000 units) tablet Take 1 tablet (50 mcg) by mouth daily           PAST MEDICAL  HISTORY:  Past Medical History:   Diagnosis Date     CAD (coronary artery disease)      mild on CT angio     CKD (chronic kidney disease) stage 3, GFR 30-59 ml/min (H)      Colon polyps 2009     DDD (degenerative disc disease), lumbosacral 7/8/2022     Esophageal reflux 4/30/2008     Family history of ischemic heart disease      Fatigue 4/11/2013     Hiatal hernia 2009     History of blood transfusion 4/08    after hiatal hernia surgery     Hyperlipidemia LDL goal <70      Infection due to 2019 novel coronavirus 11/14/2022     Macular degeneration 1/3/2012     Mild persistent asthma      Nonrheumatic aortic valve stenosis 11/14/2022     Pulmonary hypertension (H) 8/20/2012    mild     Scoliosis     mild      Type 2 diabetes mellitus with diabetic chronic kidney disease  (goal A1C<8) 10/20/2015     Ulcer of the stomach and intestine 2009    Anthony's ulcer     Unspecified essential hypertension      Vitamin D deficiency 1/3/2012         PAST SURGICAL HISTORY:  Past Surgical History:   Procedure Laterality Date     ABDOMEN SURGERY  4/08    hiatal hernia     BREAST SURGERY  1974    biopsy-begign     CARDIAC SURGERY  1995    angiogram     COLONOSCOPY  2008     HERNIA REPAIR  2008     HYSTERECTOMY, IDRIS  1974    Fibroids     ZZC OPEN STOMACH,REPR ULCER,SUTURE  2009    Hiatal hernia, and Anthony's ulcer         FAMILY HISTORY:  Family History   Problem Relation Age of Onset     Heart Disease Mother         Rheumatic fever     Heart Disease Father         CAD, DM, PVD     C.A.D. Father         1st MI in 80s     Hypertension Sister         3 sisters, all with hypertension     Hypertension Sister      Dementia Sister      Family History Negative Son      Family History Negative Daughter      Family History Negative Daughter      C.A.D. Maternal Uncle         age 54 yrs, sudden cardiac     C.A.D. Maternal Uncle         age 55 yrs         SOCIAL HISTORY:  Social History     Socioeconomic History     Marital status:       Spouse name: Not on file     Number of children: 3     Years of education: Not on file     Highest education level: Not on file   Occupational History     Employer: RETIRED   Tobacco Use     Smoking status: Never     Smokeless tobacco: Never   Vaping Use     Vaping status: Never Used   Substance and Sexual Activity     Alcohol use: Yes     Comment: 1-2 drinks week     Drug use: No     Sexual activity: Yes     Partners: Male   Other Topics Concern     Parent/sibling w/ CABG, MI or angioplasty before 65F 55M? No      Service Not Asked     Blood Transfusions Not Asked     Caffeine Concern No     Comment: 1-2 cups daily     Occupational Exposure Not Asked     Hobby Hazards Not Asked     Sleep Concern No     Comment: nocturia     Stress Concern Not Asked     Weight Concern Not Asked     Special Diet Yes     Comment: low salt, limits sugar     Back Care Not Asked     Exercise No     Comment: walking, yardwork, limited due to plantar fascitis     Bike Helmet Not Asked     Seat Belt Not Asked     Self-Exams Not Asked   Social History Narrative     Not on file     Social Determinants of Health     Financial Resource Strain: Low Risk  (3/6/2024)    Financial Resource Strain      Within the past 12 months, have you or your family members you live with been unable to get utilities (heat, electricity) when it was really needed?: No   Food Insecurity: Low Risk  (3/6/2024)    Food Insecurity      Within the past 12 months, did you worry that your food would run out before you got money to buy more?: No      Within the past 12 months, did the food you bought just not last and you didn t have money to get more?: No   Transportation Needs: Low Risk  (3/6/2024)    Transportation Needs      Within the past 12 months, has lack of transportation kept you from medical appointments, getting your medicines, non-medical meetings or appointments, work, or from getting things that you need?: No   Physical Activity: Unknown (3/6/2024)  "   Exercise Vital Sign      Days of Exercise per Week: 3 days      Minutes of Exercise per Session: Not on file   Stress: No Stress Concern Present (3/6/2024)    [employer] - Occupational Stress Questionnaire      Feeling of Stress : Not at all   Social Connections: Unknown (3/6/2024)    Social Connection and Isolation Panel [NHANES]      Frequency of Communication with Friends and Family: Not on file      Frequency of Social Gatherings with Friends and Family: Once a week      Attends [employer] Services: Not on file      Active Member of Clubs or Organizations: Not on file      Attends Club or Organization Meetings: Not on file      Marital Status: Not on file   Interpersonal Safety: Low Risk  (3/6/2024)    Interpersonal Safety      Do you feel physically and emotionally safe where you currently live?: Yes      Within the past 12 months, have you been hit, slapped, kicked or otherwise physically hurt by someone?: No      Within the past 12 months, have you been humiliated or emotionally abused in other ways by your partner or ex-partner?: No   Housing Stability: Low Risk  (3/6/2024)    Housing Stability      Do you have housing? : Yes      Are you worried about losing your housing?: No         PHYSICAL EXAMINATION:  Vitals:    08/28/24 1408   BP: (!) 146/67   Pulse: 74   SpO2: 98%   Weight: 54.4 kg (120 lb)   Height: 1.575 m (5' 2\")       GENERAL:  No acute distress.  Pleasant and cooperative.   PSYCH:  Normal mood and affect.  HEAD:  Normocephalic.  SPEECH:  No dysarthria.  EYES:  No scleral icterus.  EARS:  Hearing is intact to spoken voice.  NOSE:  Midline, symmetric, no rhinorrhea.  LUNGS:  No respiratory distress.  No increased work of breathing.  VASCULAR/PULSES:  Dorsalis pedis:  Right 2+.  Left 2+.  LOWER EXTREMITIES: No clubbing, cyanosis, or edema bilaterally.    BALANCE AND GAIT: The patient has a reciprocal gait pattern without antalgia.  She stands and ambulates with a " slight forward trunk lean.  She has a slightly shortened stride length and decreased pace.  She is able to toe walk and heel walk without difficulty.  She cannot tandem walk.  Double leg squat is performed with quadriceps dominant pattern and mild dynamic knee valgus bilaterally.    INSPECTION:  There is no erythema or ecchymosis of the low back.  There is thoracic scoliosis convex right and lumbar scoliosis convex left.    LUMBOPELVIC PALPATION:  Lumbar Spinous Processes:  not tender.  Lumbar Paraspinals:  Right not tender.  Left mild tenderness L4-L5.  Posterior Superior Iliac Spine:  Right not tender.  Left not tender.  Superior Cluneal Nerves:  Right not tender.  Left mild tenderness.  Iliac Crest:  Right not tender.  Left not tender.  Sacroiliac Joint:  Right not tender.  Left not tender.  Hip External Rotators:  Right not tender.  Left not tender.  Ischial Tuberosity:  Right not tender.  Left not tender.  Greater Trochanter:  Right not tender.  Left not tender.  Coccyx:  not tender.    THORACOLUMBAR RANGE OF MOTION:  Forward flexion (85 ): Mildly reduced range of motion, does not cause pain, does not cause radiating pain.  Extension (30 ): Moderately reduced range of motion, increases left low back/buttock pain, does not cause radiating pain.  Lateral bending right (30 ): Moderately reduced range of motion, does not cause pain, does not cause radiating pain.  Lateral bending left (30 ):  Moderately reduced range of motion, increases left low back/buttock pain, does not cause radiating pain.  Twisting right with extension:  Moderately reduced range of motion, causes slight increase in left low back/buttock pain, does not cause radiating pain.  Twisting left with extension:  Moderately reduced range of motion, increases left low back/buttock pain, does not cause radiating pain.    SACROILIAC RANGE OF MOTION:  Standing flexion:  Right -.  Left -.  Seated flexion:  Right -.  Left -.  One-leg stork (Gillet):  Right  -.  Left -.  Sacroiliac joint dysfunction:  Right -.  Left -.    HIP RANGE OF MOTION:  Flexion (110 ):  Right 110 .  Left 110 .  Extension (30 ):  Right 25 .  Left 25 .  External rotation (45 ):  Right 30 .  Left 30 .  Internal rotation (35 ):  Right 25 .  Left 35 .    KNEE RANGE OF MOTION:  Extension (0 ):  Right 0 .  Left 0 .  Flexion (135 ):  Right 140 .  Left 140 .    STRENGTH:  Hip flexion:  Right 5/5.  Left 5/5.  Hip abduction:  Right 5/5.  Left 5/5.  Hip external rotation:  Right 5/5.  Left 5/5.  Hip extension:  Right 5/5.  Left 5/5.  Knee extension:  Right 5/5.  Left 5/5.  Knee flexion:  Right 5/5.  Left 5/5.  Ankle dorsiflexion:  Right 5/5.  Left 5/5.  Great toe dorsiflexion:  Right 5/5.  Left 5/5.  Ankle plantar flexion:  Right 5/5.  Left 5/5.    SENSATION:  Intact to light touch along right L2, L3, L4, L5, S1, S2.  Intact to light touch along left L2, L3, L4, L5, S1, S2.    REFLEXES:  Patellar (L2-L4):  Right 1+.  Left 2+.  Medial hamstrings (L5-S1):  Right 2+.  Left 2+.  Achilles (S1-S2):  Right 1+.  Left 1+.  Babinski:  Right downgoing.  Left downgoing.  Forced ankle dorsiflexion (clonus):  Right 0 beats.  Left 0 beats.    LUMBOPELVIC SPECIAL TESTS:  Carolin finger:  Right -.  Left -.  Gapping / Distraction:  Right -.  Left -.  Log roll:  Right -.  Left -.  Straight-leg raise (Lasegue):  Right -.  Left -.  Crossed-straight leg raise:  Right -.  Left -.  Resisted straight leg raise:  Right -.  Left -.  FABERE (Kenji):  Right -.  Left -.  FADIR:  Right -.  Left -.  Hip scour:  Right -.  Left -.  Lateral sacral compression:  Right -.  Left -.  Clamshell:  Right -.  Left -.  Femoral nerve stretch:  Right -.  Left -.  Crossed femoral nerve stretch:  Right -.  Left -.  Ely s:  Right +.  Left +.  Yeoman s:  Right -.  Left -.  Midline sacral thrust:  Right -.  Left -.  Noble compression:  Right -.  Left -.        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  Careful attention to the numbering convention is recommended prior to any future percutaneous or surgical intervention. Nomenclature is based on twelve thoracic vertebral bodies and 12 paired ribs.  The first nonrib-bearing vertebral body will be labeled L1.     Sagittal balance is 2.9 cm positive.     Lumbar lordosis measures 8.8 degrees. . This was measured from the superior endplate of L1 to the inferior endplate of L5.     Thoracic kyphosis measures 50.9 degrees. . This was measured from the superior endplate of T1 to the inferior endplate of T12.     Dextroconvex scoliosis of the thoracic spine from the superior endplate of T8 to the inferior plate of T12 measuring 15.8 degrees. Levoconvex scoliosis of the lumbar spine from the superior plate of L1 to the inferior plate of L5 measuring 33.4 degrees.      Coronal balance is 1.1 cm to the left of the CSVL.     No focal consolidation or pleural effusion. Nonobstructive bowel gas pattern. Soft tissues unremarkable.          XR LUMBAR BENDING ONLY 2/3 VIEWS 6/8/2022 2:10 PM     INDICATION: Spinal stenosis of lumbar region with neurogenic claudication  COMPARISON: Scoliosis radiographs performed same day. MRI lumbar spine February 15, 2022.     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  This is different from the literature prior lumbar spine MRI February 15, 2022 Careful attention to the numbering convention is recommended prior to any future percutaneous or surgical intervention.     On flexion there is anterolisthesis of L5 on S1 measuring 5 mm with normal alignment on extension. The vertebral bodies of the lumbar spine otherwise have normal stature and alignment. Anterior marginal osteophytes L1/L2, L2/L3 and L3/L4. Multilevel degenerative facet arthropathy,  most pronounced at L3/L4-L5/S1. Anterior marginal osteophytes at L1/L2 and L2/L3. Atherosclerotic vascular disease. Soft tissues otherwise unremarkable.          MRI OF THE LUMBAR SPINE WITHOUT CONTRAST February 15, 2022 3:21 PM      HISTORY: Low back pain with worsening right lower extremity radicular  pain.     TECHNIQUE: Multiplanar, multisequence MRI images of the lumbar spine were acquired without IV contrast.     COMPARISON: None.     FINDINGS: There are five lumbar-type vertebrae for the purposes of this dictation.      Five lumbar type vertebral bodies. Normal vertebral body heights. 25 degree levoscoliosis apex at L2-L3. Mild Modic 1 marrow change in the L3-L4 apposing endplate on the right. The conus tip is identified at L1-L2. Visualized extraspinal soft tissues are within normal limits.     T12-L1: Moderate to severe disc height and T2 signal loss. Mild circumferential disc osteophyte complex. Mild bilateral facet arthropathy. No spinal canal or neural foraminal stenosis.      L1-L2: Moderate to severe disc height and T2 signal loss. Mild posterior annular bulge. Mild bilateral facet arthropathy. No spinal canal or neural foraminal stenosis.     L2-L3: Moderate to severe disc height and T2 signal loss. Mild circumferential disc osteophyte complex. Mild bilateral facet arthropathy and thickening of ligamenta flava. No spinal canal stenosis. No neural foraminal stenosis.     L3-L4: 9 mm leftward subluxation. Moderate disc height and T2 signal loss. Mild-to-moderate circumferential disc osteophyte complex. Mild bilateral facet arthropathy. Moderate to severe spinal canal stenosis. Severe right and mild to moderate left neural foraminal stenosis.      L4-L5: 2 mm degenerative anterolisthesis. Mild disc height loss. Moderate disc T2 signal loss. Mild posterior annular bulge and left foraminal disc osteophyte complex. Moderate bilateral facet arthropathy and thickening of ligamenta flava. Mild/moderate spinal  canal stenosis in a trefoil configuration. No right neural foraminal stenosis. Mild left neural foraminal stenosis.     L5-S1: Normal disc height. Moderate disc T2 signal loss. Mild posterior annular bulge. Small caudally directed left central disc herniation. Mild bilateral facet arthropathy. Mild left lateral recess stenosis. No central spinal canal stenosis. No right neural foraminal stenosis. Mild left neural foraminal stenosis.     IMPRESSION:  1.  25 degree levoscoliosis apex at L2-L3.  2.  9 mm L3-L4 leftward subluxation with moderate to severe spinal canal stenosis, severe right neural foraminal stenosis and mild to moderate left neural from stenosis.  3.  Mild/moderate L4-L5 spinal canal stenosis in a trefoil configuration.  4.  Mild left L5-S1 lateral recess stenosis.          ASSESSMENT/PLAN:  Ms. Cristela Salgado is an 83-year-old, right-hand-dominant, female.  S1 is a lumbarized vertebrae.  She has anterolisthesis of L5 on S1 with dynamic instability.  Ms. Cristela Salgado has thoracolumbar scoliosis, lumbosacral facet arthropathy, lumbosacral degenerative disc disease, lumbosacral stenosis with neurogenic claudication.  Discussed diagnoses, pathophysiologies, and treatment options with Ms. Cristela Salgado.  Discussed the options of doing nothing/living with it, physical therapy, chiropractic care, oral medications such as gabapentin and pregabalin, lumbosacral facet joint medial branch blocks with following radiofrequency ablations, lumbosacral interlaminar epidural corticosteroid injection, referral to neurosurgery.  Ms. Cristela Salgado states that gabapentin did not work for her in the past.  She will be set up for a left paramedian L5-S1 interlaminar epidural corticosteroid injection versus a left paramedian S1-S2 interlaminar epidural corticosteroid injection.  Ms. Cristela Salgado is follow-up in this clinic in 2 months.        Total Time on encounter:  61 minutes were spent on one more or more  of the following:  discussion with patient, history, exam, coordinating care, treatment goals, record review, documenting clinical information, and/or data review.      Samir Mathis MD        Again, thank you for allowing me to participate in the care of your patient.        Sincerely,        Samir Mathis MD

## 2024-08-28 NOTE — PATIENT INSTRUCTIONS
For nursing questions, please call [phone].    Please schedule a follow-up appointment in 2 months.  You can schedule this at the , or you can call [phone].    For medical records, please call [phone].    Please schedule fluoroscopic-guided lumbar epidural steroid injection with me.  The [facility] Procedure Scheduling Team will call you to schedule your procedure in the next 0-3 days.  You can also call them directly at [phone] option 2.

## 2024-08-29 ENCOUNTER — TELEPHONE (OUTPATIENT)
Dept: ORTHOPEDICS | Facility: CLINIC | Age: 84
End: 2024-08-29
Payer: COMMERCIAL

## 2024-08-29 DIAGNOSIS — M54.16 LUMBAR RADICULOPATHY: Primary | ICD-10-CM

## 2024-08-29 NOTE — TELEPHONE ENCOUNTER
"Screening Questions for Radiology Injections:    Injection to be done at which interventional clinic site? [facility]    If choosing [facility] for location, please inform patient:  \"[facility] is a Hospital based clinic. Before your visit, you should check with your insurance about how it covers the charges for facility services in a hospital-based clinic.     Procedure ordered by Delfina    Procedure ordered? L5-S1 Interlaminar CLOVIS   Transforaminal Cervical CLOVIS - Send to [facility] - No [facility] Site providers perform this procedure    What insurance would patient like us to bill for this procedure? ucare  IF SCHEDULING IN [place] [facility] PLEASE SCHEDULE AT LEAST 7-10 BUSINESS DAYS OUT SO A PA CAN BE OBTAINED  Worker's comp or MVA (motor vehicle accident) -Any injection DO NOT SCHEDULE and route to Abdelramhan Baer.    [employer] insurance - For SI joint injections, DO NOT SCHEDULE and route to Stacey Moore.     ALL BCBS, [employer] and [employer] - DO NOT SCHEDULE and route to Stacey Moore  MEDICA- ALL INJECTIONS- route to Stacey Moore    Is patient scheduled at [facility]? no   If YES, route every encounter to [facility]INE C[facility] CARE NAVIGATION POOL [[phone]]    Is an  needed? No     Patient has a  home? (Review Grid) YES: ok    Any chance of pregnancy? NO   If YES, do NOT schedule and route to RN pool  - Dr. Carter route to Manjula Sanchez and PM&R Nurse  [[id number]]      Is patient actively being treated for cancer or immunocompromised? No  If YES, do NOT schedule and route to RN pool/ Dr. Carter's Team    Does the patient have a bleeding or clotting disorder? No   If YES, okay to schedule AND route to RN nurse / Dr. Carter's Team   (For any patients with platelet count <100, RN must forward to provider)    Is patient taking any Blood Thinners OR Antiplatelet medication?  No   If hold needed, do NOT schedule, route to JEREMI aguilera/ Dr. Carter's " Team  Examples:   Blood Thinners: (Coumadin, Warfarin, Jantoven, Pradaxa, Xarelto, Eliquis, Edoxaban, Enoxaparin, Lovenox, Heparin, Arixtra, Fondaparinux or Fragmin)  Antiplatelet Medications: (Plavix, Brilinta or Effient)     Is patient taking any aspirin products (includes Excedrin and Fiorinal)? No   If yes route to RN pool/ Dr. Carter's Team - Do not schedule    Is patient taking any GLP-1 Antagonist (hold needed for sedation patients only) No   (semaglutide (Ozempic, Wegovy), dulaglutide (Trulicity), exenatide ER (Bydureon), tirzepatide (Mounjaro), Liraglutide (Saxenda, Victoza), semaglutide (Rybelsus), Terzepatide (Zepbound)  If YES, okay to schedule AND route to RN nurse / Dr. Carter's Team      Any allergies to contrast dye, iodine, shellfish, or numbing and steroid medications? No  If YES, schedule and add allergy information to appointment notes AND route to the RN willy/ Dr. Carter's Team  If CLOVIS and Contrast Dye / Iodine Allergy? DO NOT SCHEDULE, route to RN pool/ Dr. Carter's Team  Allergies: Lisinopril, Hydrochlorothiazide, Iodine, Metformin, Niacin, Norvasc [amlodipine besylate], and Simvastatin     Does patient have an active infection or treated for one within the past week? No  Is patient currently taking any antibiotics or steroid medications?  No   For patients on chronic, preventative, or prophylactic antibiotics, procedures may be scheduled.   For patients on antibiotics for active or recent infection, schedule 4 days after completed.  For patients on steroid medications, schedule 4 days after completed.     Has the patient had a flu shot or any other vaccinations within the past 7 days? No  If yes, explain that for the vaccine to work best they need to:     wait 1 week before and 1 week after getting any Vaccine  wait 1 week before and 2 weeks after getting any Covid Vaccine   If patient has concerns about the timing, send to RN pool/ Dr. Carter's Team    Does patient have an MRI/CT?  YES: MRI  Include Date and Check Procedure Scheduling Grid to see if required.  Was the MRI/CT done within the last 3 years?  Yes   If no route to RN Pool/ Dr. Carter's Team  If yes, where was the MRI/CT done? [facility]   Refer to PACS Transmissions list for approved external locations and route to RN Pool High Priority/ Dr. Delgados Team  If MRI was not done at approved external location do NOT schedule and route to RN pool/ Dr. Delgados Team    If patient has an imaging disc, the injection MAY be scheduled but patient must bring disc to appt or appt will be cancelled.    Is patient able to transfer to a procedure table with minimal or no assistance? Yes   If no, do NOT schedule and route to RN Pool/ Dr. Carter's Team    Procedure Specific Instructions:  If celiac plexus block, informed patient NPO for 6 hours and that it is okay to take medications with sips of water, especially blood pressure medications Not Applicable       If this is for a cervical procedure, informed patient that aspirin needs to be held for 6 days.   Not Applicable    Sedation, If Sedation is ordered for any procedure, patient must be NPO for 6 hours prior to procedure Not Applicable    If IV needed:  Do not schedule procedures requiring IV placement in the first appointment of the day or first appointment after lunch. Do NOT schedule at 0745, 0815 or 1245. ok  Instructed patient to arrive 30 minutes early for IV start if required. (Check Procedure Scheduling Grid)  Not Applicable    Reminders:  If you are started on any steroids or antibiotics between now and your appointment, you must contact us because the procedure may need to be cancelled.  Yes    As a reminder, receiving steroids can decrease your body's ability to fight infection.   Would you still like to move forward with scheduling the injection?  Yes    IV Sedation is not provided for procedures. If oral anti-anxiety medication is needed, the patient should request this from their  referring provider.    Instruct patient to arrive as directed prior to the scheduled appointment time:  If IV needed 30 minutes before appointment time     For patients 85 or older we recommend having an adult stay w/ them for the remainder of the day.     If the patient is Diabetic, remind them to bring their glucometer.      Does the patient have any questions?  NO  Keri Baer  [facility]

## 2024-09-04 DIAGNOSIS — M47.817 FACET ARTHROPATHY, LUMBOSACRAL: ICD-10-CM

## 2024-09-04 DIAGNOSIS — M51.379 DDD (DEGENERATIVE DISC DISEASE), LUMBOSACRAL: ICD-10-CM

## 2024-09-04 DIAGNOSIS — M41.9 SCOLIOSIS OF THORACOLUMBAR SPINE, UNSPECIFIED SCOLIOSIS TYPE: ICD-10-CM

## 2024-09-04 DIAGNOSIS — M48.07 LUMBOSACRAL STENOSIS WITH NEUROGENIC CLAUDICATION: Primary | ICD-10-CM

## 2024-09-04 RX ORDER — METHYLPREDNISOLONE 32 MG/1
32 TABLET ORAL ONCE
Qty: 1 TABLET | Refills: 0 | Status: SHIPPED | OUTPATIENT
Start: 2024-09-04 | End: 2024-09-04

## 2024-09-04 RX ORDER — DIPHENHYDRAMINE HCL 50 MG
50 CAPSULE ORAL ONCE
Qty: 1 CAPSULE | Refills: 0 | Status: SHIPPED | OUTPATIENT
Start: 2024-09-04 | End: 2024-09-04

## 2024-09-04 NOTE — PROGRESS NOTES
For Ms. Cristela JOLLY Salgado, ordered premedication for her contrast allergy for her upcoming lumbosacral interlaminar epidural corticosteroid injection.    Samir Mathis MD

## 2024-09-05 ENCOUNTER — TELEPHONE (OUTPATIENT)
Dept: ORTHOPEDICS | Facility: CLINIC | Age: 84
End: 2024-09-05
Payer: COMMERCIAL

## 2024-09-05 NOTE — TELEPHONE ENCOUNTER
Call placed to patient and informed her no pre treatment needed. She stated understanding.   Called pharmacy and cancelled prescriptions.

## 2024-09-05 NOTE — TELEPHONE ENCOUNTER
"Call placed to pt to verify her allergy as she noted \"NO\" when asked about this in her pre-procedure questions, but \"iodine contrast\" is listed as allergy on her list with reaction of hives.     Pt reports it has been years since her reaction, probably 15 years ago. Pt was given pre-treatment prior to injection on 7/18/22. She then had injection on 8/29/22 and contrast dye was used without pre-treatment. Per injection note, \"(Ms. Cristela Salgado stated that her reaction to iodine was many years ago and she was not sure if this is a real allergy, so Isovue-M 200 (iopamidol) was used.)\"     Will reach out to Dr. Mathis to see if we need to proceed with pre-treatment. Will call patient back with a response.       "

## 2024-09-05 NOTE — TELEPHONE ENCOUNTER
----- Message from Samir Mathis sent at 9/4/2024  6:22 PM CDT -----  Please let Ms. Cristela JOLLY Salgado know that I ordered premedication for her contrast allergy for her upcoming lumbosacral interlaminar epidural corticosteroid injection.  Thank you.    MW

## 2024-09-06 NOTE — TELEPHONE ENCOUNTER
Noted: iodine contrast allergy, per provider no protocol needed.     Cecile GAMINON, RN Care Coordinator  [facility]  Pain [facility]

## 2024-09-09 ENCOUNTER — RADIOLOGY INJECTION OFFICE VISIT (OUTPATIENT)
Dept: PALLIATIVE MEDICINE | Facility: CLINIC | Age: 84
End: 2024-09-09
Attending: PHYSICAL MEDICINE & REHABILITATION
Payer: COMMERCIAL

## 2024-09-09 VITALS — OXYGEN SATURATION: 98 % | HEART RATE: 78 BPM | DIASTOLIC BLOOD PRESSURE: 66 MMHG | SYSTOLIC BLOOD PRESSURE: 136 MMHG

## 2024-09-09 DIAGNOSIS — M41.9 SCOLIOSIS OF THORACOLUMBAR SPINE, UNSPECIFIED SCOLIOSIS TYPE: ICD-10-CM

## 2024-09-09 DIAGNOSIS — M48.07 LUMBOSACRAL STENOSIS WITH NEUROGENIC CLAUDICATION: Primary | ICD-10-CM

## 2024-09-09 DIAGNOSIS — M54.16 LUMBAR RADICULOPATHY: ICD-10-CM

## 2024-09-09 DIAGNOSIS — M47.817 FACET ARTHROPATHY, LUMBOSACRAL: ICD-10-CM

## 2024-09-09 DIAGNOSIS — M51.379 DDD (DEGENERATIVE DISC DISEASE), LUMBOSACRAL: ICD-10-CM

## 2024-09-09 PROCEDURE — 62323 NJX INTERLAMINAR LMBR/SAC: CPT | Performed by: PHYSICAL MEDICINE & REHABILITATION

## 2024-09-09 RX ORDER — IOPAMIDOL 408 MG/ML
1.2 INJECTION, SOLUTION INTRATHECAL ONCE
Status: COMPLETED | OUTPATIENT
Start: 2024-09-09 | End: 2024-09-09

## 2024-09-09 RX ORDER — DEXAMETHASONE SODIUM PHOSPHATE 10 MG/ML
10 INJECTION, SOLUTION INTRAMUSCULAR; INTRAVENOUS ONCE
Status: COMPLETED | OUTPATIENT
Start: 2024-09-09 | End: 2024-09-09

## 2024-09-09 RX ORDER — LIDOCAINE HYDROCHLORIDE 10 MG/ML
2 INJECTION, SOLUTION EPIDURAL; INFILTRATION; INTRACAUDAL; PERINEURAL ONCE
Status: COMPLETED | OUTPATIENT
Start: 2024-09-09 | End: 2024-09-09

## 2024-09-09 RX ADMIN — IOPAMIDOL 1.2 ML: 408 INJECTION, SOLUTION INTRATHECAL at 08:54

## 2024-09-09 RX ADMIN — DEXAMETHASONE SODIUM PHOSPHATE 10 MG: 10 INJECTION, SOLUTION INTRAMUSCULAR; INTRAVENOUS at 08:54

## 2024-09-09 RX ADMIN — LIDOCAINE HYDROCHLORIDE 2 ML: 10 INJECTION, SOLUTION EPIDURAL; INFILTRATION; INTRACAUDAL; PERINEURAL at 08:54

## 2024-09-09 ASSESSMENT — PAIN SCALES - GENERAL: PAINLEVEL: MILD PAIN (2)

## 2024-09-09 NOTE — NURSING NOTE
Pre-procedure Intake  If YES to any questions or NO to having a   Please complete laminated checklist and leave on the computer keyboard for Provider, verbally inform provider if able.    For SCS Trial, RFA's or any sedation procedure:  Have you been fasting? NA  If yes, for how long?     Are you taking any any blood thinners such as Coumadin, Warfarin, Jantoven, Pradaxa Xarelto, Eliquis, Edoxaban, Enoxaparin, Lovenox, Heparin, Arixtra, Fondaparinux, or Fragmin? OR Antiplatelet medication such as Plavix, Brilinta, or Effient?   No   If yes, when did you take your last dose?     Do you take aspirin?  No  If cervical procedure, have you held aspirin for 6 days?   NA    Is the Pt taking any GLP-1 Antagonist (hold needed for sedation patients only)  (semaglutide (Ozempic, Wegovy), dulaglutide (Trulicity), exenatide ER (Bydureon), tirzepatide (Mounjaro), Liraglutide (Saxenda, Victoza), semaglutide (Rybelsus)     NA  If yes, when did you take your last dose?     Do you have any allergies to contrast dye, iodine, steroid and/or numbing medications?  Yes- contrast dye    Are you currently taking antibiotics or have an active infection?  NO    Have you had a fever/elevated temperature within the past week? NO    Are you currently taking oral steroids? NO    Do you have a ? Yes    Are you pregnant or breastfeeding?  Not Applicable    Have you received any vaccinations in the last week? NO    Notify provider and RNs if systolic BP >170, diastolic BP >100, P >100 or O2 sats < 90%      Deya Dixon MA  [facility]

## 2024-09-09 NOTE — NURSING NOTE
Discharge Information    IV Discontiued Time:  NA    Amount of Fluid Infused:  NA    Discharge Criteria = When patient returns to baseline or as per MD order    Consciousness:  Pt is fully awake    Circulation:  BP +/- 20% of pre-procedure level    Respiration:  Patient is able to breathe deeply    O2 Sat:  Patient is able to maintain O2 Sat >92% on room air    Activity:  Moves 4 extremities on command    Ambulation:  Patient is able to stand and walk or stand and pivot into wheelchair    Dressing:  Clean/dry or No Dressing    Notes:   Discharge instructions and AVS given to patient    Patient meets criteria for discharge?  YES    Admitted to PCU?  No    Responsible adult present to accompany patient home?  Yes    Signature/Title:    Cecile Bermudez RN  RN Care Coordinator  [facility]

## 2024-09-09 NOTE — PROGRESS NOTES
"PHYSICAL MEDICINE & REHABILITATION / MEDICAL SPINE      PROCEDURE DATE:  Sep 9, 2024      PATIENT NAME:  Cristela Salgado  MRN:  [id number]  YOB: 1940      PRE-PROCEDURE DIAGNOSIS:  1. Lumbosacral stenosis with neurogenic claudication    2. Facet arthropathy, lumbosacral    3. DDD (degenerative disc disease), lumbosacral    4. Scoliosis of thoracolumbar spine, unspecified scoliosis type    5. Lumbar radiculopathy        POST-PROCEDURE DIAGNOSIS:  1. Lumbosacral stenosis with neurogenic claudication    2. Facet arthropathy, lumbosacral    3. DDD (degenerative disc disease), lumbosacral    4. Scoliosis of thoracolumbar spine, unspecified scoliosis type    5. Lumbar radiculopathy        PROCEDURE:  Fluoroscopic-guided left paramedian L5-S1 interlaminar epidural steroid injection.  (CPT code:  02671)      PROCEDURE IN DETAIL:  Prior to the procedure, educational material was provided for the patient to review and take home.  After a discussion of the risks, benefits, and alternatives to the procedure, the patient expressed understanding and wished to proceed.  Consent form was signed.  The patient was brought to the fluoroscopy suite and placed in the prone position.  Procedural pause was conducted to verify:  patient identity, procedure to be performed, laterality, site, and patient position.    The skin was sterilely prepped using chlorhexidine and allowed to dry.  The skin was draped in the usual sterile fashion.  After identifying the L5-S1 interspace with fluoroscopy, the skin and subcutaneous tissue were infiltrated with 1.5 ml 1% preservative-free lidocaine.  Utilizing a loss of resistance technique and intermittent fluoroscopic guidance, 20g 3.5\" Tuohy needle was advanced into the epidural space using a left paramedian approach.  Proper needle positioning was confirmed using multiple fluoroscopic views.  The epidural space was accessed at a depth of 7 cm.  After negative aspiration, 1.2 ml " Isovue-M 200 (iopamidol) was injected confirming epidural spread without evidence of intravascular or intrathecal spread.  Next, 10 mg dexamethasone and 2 ml preservative-free normal saline were injected.  Following the injection, the needle was withdrawn slightly and flushed with 0.5 ml preservative-free 1% lidocaine as it was fully extracted.    The patient tolerated the procedure well, and there were no apparent complications.  The patient was escorted back to the postprocedure room.  The patient was monitored for side effects.  No reactions were noted.  After appropriate observation, the patient was dismissed from the clinic in good condition.    Preprocedure pain level: 2/10.  Postprocedure pain level: 2/10.      Samir Mathis MD

## 2024-09-09 NOTE — PATIENT INSTRUCTIONS
[facility] Procedure Discharge Instructions    Today you saw:   Dr. Young Mathis    Your procedure:  Epidural steroid injection      Medications used:  Lidocaine (anesthetic)   Dexamethasone (steroid) , Iso-reji 200 (contrast)           Be cautious when walking as numbness and/or weakness in the legs may occur up to 6-8 hours after the procedure due to effect of the local anesthetic  Do not drive for 6 hours. The effect of the local anesthetic could slow your reflexes.   Avoid strenuous activity for the first 24 hours. You may resume your regular activities after that.   You may shower, however avoid swimming, tub baths or hot tubs for 24 hours following your procedure  You may have a mild to moderate increase in pain for several days following the injection.    You may use ice packs for 10-15 minutes, 3 to 4 times a day at the injection site for comfort  Do not use heat to painful areas for 6 to 8 hours. This will give the local anesthetic time to wear off and prevent you from accidentally burning your skin.  Unless you have been directed to avoid the use of anti-inflammatory medications (NSAIDS-ibuprofen, Aleve, Motrin), you may use these medications or Tylenol for pain control if needed.   With diabetes, check your blood sugar more frequently than usual as your blood sugar may be higher than normal for 10-14 days following a steroid injection. Contact your doctor who manages your diabetes if your blood sugar is higher than usual  Possible side effects of steroids that you may experience include flushing, elevated blood pressure, increased appetite, mild headaches and restlessness.  All of these symptoms will get better with time.  It may take up to 14 days for the steroid medication to start working although you may feel the effect as early as a few days after the procedure.   Follow up with your referring provider in 2-3 weeks- Dr Mathis    If you experience any of the following, call the Spine  Center line during work hours at [phone]:  -Fever over 100 degree F  -Swelling, bleeding, redness, drainage, warmth at the injection site  -Progressive weakness or numbness in your legs   -Loss of bowel or bladder function  -Unusual headache that is not relieved by Tylenol or your regular headache medication  -Unusual new onset of pain that is not improving

## 2024-10-28 ENCOUNTER — OFFICE VISIT (OUTPATIENT)
Dept: ORTHOPEDICS | Facility: CLINIC | Age: 84
End: 2024-10-28
Payer: COMMERCIAL

## 2024-10-28 VITALS
SYSTOLIC BLOOD PRESSURE: 135 MMHG | WEIGHT: 120 LBS | HEART RATE: 72 BPM | DIASTOLIC BLOOD PRESSURE: 62 MMHG | BODY MASS INDEX: 22.08 KG/M2 | OXYGEN SATURATION: 98 % | HEIGHT: 62 IN

## 2024-10-28 DIAGNOSIS — M47.817 FACET ARTHROPATHY, LUMBOSACRAL: Primary | ICD-10-CM

## 2024-10-28 DIAGNOSIS — M51.370 DEGENERATION OF INTERVERTEBRAL DISC OF LUMBOSACRAL REGION WITH DISCOGENIC BACK PAIN: ICD-10-CM

## 2024-10-28 DIAGNOSIS — M41.9 SCOLIOSIS OF THORACOLUMBAR SPINE, UNSPECIFIED SCOLIOSIS TYPE: ICD-10-CM

## 2024-10-28 DIAGNOSIS — M48.07 LUMBOSACRAL STENOSIS WITH NEUROGENIC CLAUDICATION: ICD-10-CM

## 2024-10-28 PROCEDURE — 99215 OFFICE O/P EST HI 40 MIN: CPT | Performed by: PHYSICAL MEDICINE & REHABILITATION

## 2024-10-28 ASSESSMENT — PAIN SCALES - GENERAL: PAINLEVEL_OUTOF10: MODERATE PAIN (5)

## 2024-10-28 NOTE — LETTER
10/28/2024      Cristela Salgado  [address]  [facility] [id number]      Dear Colleague,    Thank you for referring your patient, Cristela Salgado, to the [facility]. Please see a copy of my visit note below.    PHYSICAL MEDICINE & REHABILITATION / MEDICAL SPINE        Date:  Oct 28, 2024    Name:  Cristela Salgado  YOB: 1940  MRN:  [id number]          CHART REVIEW:  Reviewed 06/08/2022 note from Dipesh Méndez MD (neurosurgery).  Ms. Cristela Salgado had back and leg pain beginning in February 2022.  Pain began when she was walking up stairs, and Ms. Cristela Salgado's right leg went numb, and she was unable to walk anymore.  Ms. Cristela Salgado would occasionally get pain into her left leg.  Pain was worse with turning over in bed and getting up initially.  Pain was worse with transitioning from sitting to standing.  Ms. Cristela Salgado was started on gabapentin and tizanidine.  She was referred to physical therapy.  She was referred to medical spine.  If she were to fail conservative management, surgery would require a multilevel lumbar fusion procedure.         CHIEF COMPLAINT:  Left low back pain.  On        HISTORY OF PRESENT ILLNESS:  Ms. Cristela Salgado is an 84-year-old, right-hand-dominant, female.  She has 3 children, 6 grandchildren, and 1 great grandchild.     Ms. Cristela Salgado denied any prior or recent injuries of her mid back, low back, pelvis, hips, thighs, knees, legs, ankles, feet, toes.     Ms. Cristela Salgado denied any personal or family history of autoimmune diseases, rheumatologic diseases, gout, pseudogout.  She denied any personal or family history of neurologic diseases.  Ms. Cristela Salgado denied any personal or family history of inflammatory bowel diseases.     On 07/18/2022, Ms. Cristela Salgado had a fluoroscopic guided right paramedian S1-S2 interlaminar epidural corticosteroid injection.  On 08/12/2022, Ms. Cristela AZEVEDO  "Naomi reported she was no longer have any pain in her right lower extremity.     On 08/29/2022, Ms. Cristela Salgado had medial branch blocks of the bilateral L3-L4 and L4-L5 facet joints.  On 08/28/2024, Ms. Cristela Salgado reported that she did not notice any benefit from the medial branch blocks.    Ms. Cristela Salgado was last seen in this clinic on 08/28/2024.  On 09/09/2024, Ms. Cristela Salgado had a left paramedian L5-S1 interlaminar epidural corticosteroid injection.  Ms. Cristela Salgado returns to clinic today, 10/28/2024.    Ms. Cristela Salgado noted 100% benefit from the left paramedian L5-S1 interlaminar epidural corticosteroid injection for 1 week.  She then felt that her pain gradually returned to its baseline in another 1 to 2 weeks.    Ms. Cristela Salgado has intermittent left low back pain that is without radiation.  This pain is described as \"aching.\"  This back pain is currently rated as 5/10.  This pain is worse with standing with a slight forward bend.  This is improved with ice, sitting, use of a back brace, and lying down.  Ms. Cristela Salgado lies on her sides to sleep.  Her low back pain has really not changed.  Ms. Cristela Salgado is using acetaminophen and ibuprofen.    Ms. Cristela Salgado notes that her bilateral lower extremities become weak and tired with walking.  She does note improvement in these symptoms with leaning forward on a shopping cart.    Ms. Cristela Salgado denies that her pains change with coughing, sneezing, driving and hitting a pothole.  Her pain does not keep her awake nor wake her.  Ms. Cristela Salgado notes generalized weakness.  She denies any catching, locking, popping.  Ms. Cristela Salgado denies any bruising, redness, swelling.  Ms. Cristela Salgado has not had any giveway episodes of her lower extremities, but she feels like her lower extremities could give way.  Ms. Cristela Salgado denies any tripping, stumbling, falling.  She is not using any " assistive devices for ambulation.  Ms. Cristela Salgado notes intermittent numbness, tingling, pins-and-needles in her left low back.  She denies any other numbness, tingling, pins-and-needles.  Ms. Cristela Salgado denies any saddle anesthesia, bowel incontinence, bladder incontinence.        ALLERGIES:  Allergies   Allergen Reactions     Lisinopril Cough     Hydrochlorothiazide      Renal insufficiency     Iodine Hives     Iodine contrast     Metformin      Loose stools     Niacin      LEG CRAMPS     Norvasc [Amlodipine Besylate] Swelling     Simvastatin      ACHINESS           MEDICATIONS:  Current Outpatient Medications   Medication Sig Dispense Refill     albuterol (PROAIR HFA/PROVENTIL HFA/VENTOLIN HFA) 108 (90 Base) MCG/ACT inhaler Inhale 2 puffs into the lungs every 6 hours as needed for shortness of breath, wheezing or cough 18 g 11     aspirin 81 MG tablet Take 1 tablet by mouth daily.       diltiazem ER COATED BEADS (CARDIZEM CD) 360 MG 24 hr capsule Take 1 capsule (360 mg) by mouth daily 90 capsule 3     ezetimibe (ZETIA) 10 MG tablet Take 1 tablet (10 mg) by mouth daily 90 tablet 3     hydrALAZINE (APRESOLINE) 50 MG tablet Take 1 tablet (50 mg) by mouth 3 times daily 270 tablet 3     labetalol (NORMODYNE) 100 MG tablet TAKE 1 TABLET BY MOUTH TWICE A  tablet 2     losartan (COZAAR) 100 MG tablet Take 1 tablet (100 mg) by mouth daily 90 tablet 3     omeprazole (PRILOSEC) 40 MG DR capsule Take 1 capsule (40 mg) by mouth daily 90 capsule 3     rosuvastatin (CRESTOR) 40 MG tablet Take 1 tablet (40 mg) by mouth daily 90 tablet 3     vitamin D3 (CHOLECALCIFEROL) 50 mcg (2000 units) tablet Take 1 tablet (50 mcg) by mouth daily           PAST MEDICAL HISTORY:  Past Medical History:   Diagnosis Date     CAD (coronary artery disease)      mild on CT angio     CKD (chronic kidney disease) stage 3, GFR 30-59 ml/min (H)      Colon polyps 2009     DDD (degenerative disc disease), lumbosacral 7/8/2022      Esophageal reflux 4/30/2008     Family history of ischemic heart disease      Fatigue 4/11/2013     Hiatal hernia 2009     History of blood transfusion 4/08    after hiatal hernia surgery     Hyperlipidemia LDL goal <70      Infection due to 2019 novel coronavirus 11/14/2022     Macular degeneration 1/3/2012     Mild persistent asthma      Nonrheumatic aortic valve stenosis 11/14/2022     Pulmonary hypertension (H) 8/20/2012    mild     Scoliosis     mild      Type 2 diabetes mellitus with diabetic chronic kidney disease  (goal A1C<8) 10/20/2015     Ulcer of the stomach and intestine 2009    Anthony's ulcer     Unspecified essential hypertension      Vitamin D deficiency 1/3/2012         PAST SURGICAL HISTORY:  Past Surgical History:   Procedure Laterality Date     ABDOMEN SURGERY  4/08    hiatal hernia     BREAST SURGERY  1974    biopsy-begign     CARDIAC SURGERY  1995    angiogram     COLONOSCOPY  2008     HERNIA REPAIR  2008     HYSTERECTOMY, IDRIS  1974    Fibroids     ZZC OPEN STOMACH,REPR ULCER,SUTURE  2009    Hiatal hernia, and Anthony's ulcer         FAMILY HISTORY:  Family History   Problem Relation Age of Onset     Heart Disease Mother         Rheumatic fever     Heart Disease Father         CAD, DM, PVD     C.A.D. Father         1st MI in 80s     Hypertension Sister         3 sisters, all with hypertension     Hypertension Sister      Dementia Sister      Family History Negative Son      Family History Negative Daughter      Family History Negative Daughter      C.A.D. Maternal Uncle         age 54 yrs, sudden cardiac     C.A.D. Maternal Uncle         age 55 yrs         SOCIAL HISTORY:  Social History     Socioeconomic History     Marital status:      Spouse name: Not on file     Number of children: 3     Years of education: Not on file     Highest education level: Not on file   Occupational History     Employer: RETIRED   Tobacco Use     Smoking status: Never     Smokeless tobacco: Never   Vaping  Use     Vaping status: Never Used   Substance and Sexual Activity     Alcohol use: Yes     Comment: 1-2 drinks week     Drug use: No     Sexual activity: Yes     Partners: Male   Other Topics Concern     Parent/sibling w/ CABG, MI or angioplasty before 65F 55M? No      Service Not Asked     Blood Transfusions Not Asked     Caffeine Concern No     Comment: 1-2 cups daily     Occupational Exposure Not Asked     Hobby Hazards Not Asked     Sleep Concern No     Comment: nocturia     Stress Concern Not Asked     Weight Concern Not Asked     Special Diet Yes     Comment: low salt, limits sugar     Back Care Not Asked     Exercise No     Comment: walking, yardwork, limited due to plantar fascitis     Bike Helmet Not Asked     Seat Belt Not Asked     Self-Exams Not Asked   Social History Narrative     Not on file     Social Drivers of Health     Financial Resource Strain: Low Risk  (3/6/2024)    Financial Resource Strain      Within the past 12 months, have you or your family members you live with been unable to get utilities (heat, electricity) when it was really needed?: No   Food Insecurity: Low Risk  (3/6/2024)    Food Insecurity      Within the past 12 months, did you worry that your food would run out before you got money to buy more?: No      Within the past 12 months, did the food you bought just not last and you didn t have money to get more?: No   Transportation Needs: Low Risk  (3/6/2024)    Transportation Needs      Within the past 12 months, has lack of transportation kept you from medical appointments, getting your medicines, non-medical meetings or appointments, work, or from getting things that you need?: No   Physical Activity: Unknown (3/6/2024)    Exercise Vital Sign      Days of Exercise per Week: 3 days      Minutes of Exercise per Session: Not on file   Stress: No Stress Concern Present (3/6/2024)    [employer] - Occupational Stress Questionnaire      Feeling of  "Stress : Not at all   Social Connections: Unknown (3/6/2024)    Social Connection and Isolation Panel [NHANES]      Frequency of Communication with Friends and Family: Not on file      Frequency of Social Gatherings with Friends and Family: Once a week      Attends [employer] Services: Not on file      Active Member of Clubs or Organizations: Not on file      Attends Club or Organization Meetings: Not on file      Marital Status: Not on file   Interpersonal Safety: Low Risk  (3/6/2024)    Interpersonal Safety      Do you feel physically and emotionally safe where you currently live?: Yes      Within the past 12 months, have you been hit, slapped, kicked or otherwise physically hurt by someone?: No      Within the past 12 months, have you been humiliated or emotionally abused in other ways by your partner or ex-partner?: No   Housing Stability: Low Risk  (3/6/2024)    Housing Stability      Do you have housing? : Yes      Are you worried about losing your housing?: No         PHYSICAL EXAMINATION:  Vitals:    10/28/24 1416   BP: 135/62   BP Location: Right arm   Patient Position: Sitting   Cuff Size: Adult Regular   Pulse: 72   SpO2: 98%   Weight: 54.4 kg (120 lb)   Height: 1.575 m (5' 2\")       GENERAL:  No acute distress.  Pleasant and cooperative.   PSYCH:  Normal mood and affect.  HEAD:  Normocephalic.  SPEECH:  No dysarthria.  EYES:  No scleral icterus.  EARS:  Hearing is intact to spoken voice.  NOSE:  Midline, symmetric, no rhinorrhea.  LUNGS:  No respiratory distress.  No increased work of breathing.        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  Careful attention to the numbering convention is recommended prior to any future percutaneous or surgical intervention. Nomenclature is based on twelve " thoracic vertebral bodies and 12 paired ribs.  The first nonrib-bearing vertebral body will be labeled L1.     Sagittal balance is 2.9 cm positive.     Lumbar lordosis measures 8.8 degrees. . This was measured from the superior endplate of L1 to the inferior endplate of L5.     Thoracic kyphosis measures 50.9 degrees. . This was measured from the superior endplate of T1 to the inferior endplate of T12.     Dextroconvex scoliosis of the thoracic spine from the superior endplate of T8 to the inferior plate of T12 measuring 15.8 degrees. Levoconvex scoliosis of the lumbar spine from the superior plate of L1 to the inferior plate of L5 measuring 33.4 degrees.      Coronal balance is 1.1 cm to the left of the CSVL.     No focal consolidation or pleural effusion. Nonobstructive bowel gas pattern. Soft tissues unremarkable.           XR LUMBAR BENDING ONLY 2/3 VIEWS 6/8/2022 2:10 PM     INDICATION: Spinal stenosis of lumbar region with neurogenic claudication  COMPARISON: Scoliosis radiographs performed same day. MRI lumbar spine February 15, 2022.     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  This is different from the literature prior lumbar spine MRI February 15, 2022 Careful attention to the numbering convention is recommended prior to any future percutaneous or surgical intervention.     On flexion there is anterolisthesis of L5 on S1 measuring 5 mm with normal alignment on extension. The vertebral bodies of the lumbar spine otherwise have normal stature and alignment. Anterior marginal osteophytes L1/L2, L2/L3 and L3/L4. Multilevel degenerative facet arthropathy, most pronounced at L3/L4-L5/S1. Anterior marginal osteophytes at L1/L2 and L2/L3. Atherosclerotic vascular disease. Soft tissues otherwise unremarkable.           MRI OF THE LUMBAR SPINE WITHOUT CONTRAST February 15, 2022 3:21 PM      HISTORY: Low back pain with worsening right lower extremity  radicular  pain.     TECHNIQUE: Multiplanar, multisequence MRI images of the lumbar spine were acquired without IV contrast.     COMPARISON: None.     FINDINGS: There are five lumbar-type vertebrae for the purposes of this dictation.      Five lumbar type vertebral bodies. Normal vertebral body heights. 25 degree levoscoliosis apex at L2-L3. Mild Modic 1 marrow change in the L3-L4 apposing endplate on the right. The conus tip is identified at L1-L2. Visualized extraspinal soft tissues are within normal limits.     T12-L1: Moderate to severe disc height and T2 signal loss. Mild circumferential disc osteophyte complex. Mild bilateral facet arthropathy. No spinal canal or neural foraminal stenosis.      L1-L2: Moderate to severe disc height and T2 signal loss. Mild posterior annular bulge. Mild bilateral facet arthropathy. No spinal canal or neural foraminal stenosis.     L2-L3: Moderate to severe disc height and T2 signal loss. Mild circumferential disc osteophyte complex. Mild bilateral facet arthropathy and thickening of ligamenta flava. No spinal canal stenosis. No neural foraminal stenosis.     L3-L4: 9 mm leftward subluxation. Moderate disc height and T2 signal loss. Mild-to-moderate circumferential disc osteophyte complex. Mild bilateral facet arthropathy. Moderate to severe spinal canal stenosis. Severe right and mild to moderate left neural foraminal stenosis.      L4-L5: 2 mm degenerative anterolisthesis. Mild disc height loss. Moderate disc T2 signal loss. Mild posterior annular bulge and left foraminal disc osteophyte complex. Moderate bilateral facet arthropathy and thickening of ligamenta flava. Mild/moderate spinal canal stenosis in a trefoil configuration. No right neural foraminal stenosis. Mild left neural foraminal stenosis.     L5-S1: Normal disc height. Moderate disc T2 signal loss. Mild posterior annular bulge. Small caudally directed left central disc herniation. Mild bilateral facet arthropathy.  Mild left lateral recess stenosis. No central spinal canal stenosis. No right neural foraminal stenosis. Mild left neural foraminal stenosis.     IMPRESSION:  1.  25 degree levoscoliosis apex at L2-L3.  2.  9 mm L3-L4 leftward subluxation with moderate to severe spinal canal stenosis, severe right neural foraminal stenosis and mild to moderate left neural from stenosis.  3.  Mild/moderate L4-L5 spinal canal stenosis in a trefoil configuration.  4.  Mild left L5-S1 lateral recess stenosis.           ASSESSMENT/PLAN:  Ms. Cristela Salgado is an 84-year-old, right-hand-dominant, female.  S1 is lumbarized.  She has anterolisthesis of L5 on S1 with dynamic instability.  Ms. Cristela Salgado has thoracolumbar scoliosis, lumbosacral facet arthropathy, lumbosacral degenerative disc disease, lumbosacral stenosis with neurogenic claudication.  Ms. Cristela Salgado's left low back pain really seems most consistent with lumbar lumbosacral facet arthropathy.  The pain and weakness in her bilateral lower extremities really seems most consistent with lumbosacral spinal stenosis.  Discussed diagnoses, pathophysiology's, and treatment options with Ms. Cristela Salgado.  Discussed the options of doing nothing/living with it, physical therapy, chiropractic care, core strengthening, oral medication such as gabapentin and pregabalin, lumbosacral facet joint medial branch blocks with following radiofrequency ablations (would plan to target the left L4-L5, L5-S1, and S1-S2 facet joints), lumbosacral interlaminar epidural corticosteroid injection, or referral to spine surgery.  Ms. Cristela Salgado did not feel that gabapentin worked for her in the past.  She would like to consider options.  Ms. Cristela Salgado requested written information on medial branch blocks, and this was provided.  Ms. Cristela Salgado will consider options and contact this clinic after she makes a decision.  Ms. Cristela Salgado like to follow-up in this clinic as  needed.        Total Time on encounter:  41 minutes were spent on one more or more of the following:  discussion with patient, history, exam, coordinating care, treatment goals, record review, documenting clinical information, and/or data review.      Samir Mathis MD        Again, thank you for allowing me to participate in the care of your patient.        Sincerely,        Samir Mathis MD

## 2024-10-28 NOTE — PROGRESS NOTES
PHYSICAL MEDICINE & REHABILITATION / MEDICAL SPINE        Date:  Oct 28, 2024    Name:  Cristela Salgado  YOB: 1940  MRN:  [id number]          CHART REVIEW:  Reviewed 06/08/2022 note from Dipesh Méndez MD (neurosurgery).  Ms. Cristela Salgado had back and leg pain beginning in February 2022.  Pain began when she was walking up stairs, and Ms. Cristela Salgado's right leg went numb, and she was unable to walk anymore.  Ms. Cristela Salgado would occasionally get pain into her left leg.  Pain was worse with turning over in bed and getting up initially.  Pain was worse with transitioning from sitting to standing.  Ms. Cristela Salgado was started on gabapentin and tizanidine.  She was referred to physical therapy.  She was referred to medical spine.  If she were to fail conservative management, surgery would require a multilevel lumbar fusion procedure.         CHIEF COMPLAINT:  Left low back pain.  On        HISTORY OF PRESENT ILLNESS:  Ms. Cristela Salgado is an 84-year-old, right-hand-dominant, female.  She has 3 children, 6 grandchildren, and 1 great grandchild.     Ms. Cristela Salgado denied any prior or recent injuries of her mid back, low back, pelvis, hips, thighs, knees, legs, ankles, feet, toes.     Ms. Cristela Salgado denied any personal or family history of autoimmune diseases, rheumatologic diseases, gout, pseudogout.  She denied any personal or family history of neurologic diseases.  Ms. Cristela Salgado denied any personal or family history of inflammatory bowel diseases.     On 07/18/2022, Ms. Cristela Salgado had a fluoroscopic guided right paramedian S1-S2 interlaminar epidural corticosteroid injection.  On 08/12/2022, Ms. Cristela Salgado reported she was no longer have any pain in her right lower extremity.     On 08/29/2022, Ms. Cristela Salgado had medial branch blocks of the bilateral L3-L4 and L4-L5 facet joints.  On 08/28/2024, Ms. Cristela Salgado reported that she did not  "notice any benefit from the medial branch blocks.    Ms. Cristela Salgado was last seen in this clinic on 08/28/2024.  On 09/09/2024, Ms. Cristela Salgado had a left paramedian L5-S1 interlaminar epidural corticosteroid injection.  Ms. Cristela Salgado returns to clinic today, 10/28/2024.    Ms. Cristela Salgado noted 100% benefit from the left paramedian L5-S1 interlaminar epidural corticosteroid injection for 1 week.  She then felt that her pain gradually returned to its baseline in another 1 to 2 weeks.    Ms. Cristela Salgado has intermittent left low back pain that is without radiation.  This pain is described as \"aching.\"  This back pain is currently rated as 5/10.  This pain is worse with standing with a slight forward bend.  This is improved with ice, sitting, use of a back brace, and lying down.  Ms. Cristela Salgado lies on her sides to sleep.  Her low back pain has really not changed.  Ms. Cristela Salgado is using acetaminophen and ibuprofen.    Ms. Cristela Salgado notes that her bilateral lower extremities become weak and tired with walking.  She does note improvement in these symptoms with leaning forward on a shopping cart.    Ms. Cristela Salgado denies that her pains change with coughing, sneezing, driving and hitting a pothole.  Her pain does not keep her awake nor wake her.  Ms. Cristela Salgado notes generalized weakness.  She denies any catching, locking, popping.  Ms. Cristela Salgado denies any bruising, redness, swelling.  Ms. Cristela Salgado has not had any giveway episodes of her lower extremities, but she feels like her lower extremities could give way.  Ms. Cristela Salgado denies any tripping, stumbling, falling.  She is not using any assistive devices for ambulation.  Ms. Cristela Salgado notes intermittent numbness, tingling, pins-and-needles in her left low back.  She denies any other numbness, tingling, pins-and-needles.  Ms. Cristela Salgado denies any saddle anesthesia, bowel " incontinence, bladder incontinence.        ALLERGIES:  Allergies   Allergen Reactions    Lisinopril Cough    Hydrochlorothiazide      Renal insufficiency    Iodine Hives     Iodine contrast    Metformin      Loose stools    Niacin      LEG CRAMPS    Norvasc [Amlodipine Besylate] Swelling    Simvastatin      ACHINESS           MEDICATIONS:  Current Outpatient Medications   Medication Sig Dispense Refill    albuterol (PROAIR HFA/PROVENTIL HFA/VENTOLIN HFA) 108 (90 Base) MCG/ACT inhaler Inhale 2 puffs into the lungs every 6 hours as needed for shortness of breath, wheezing or cough 18 g 11    aspirin 81 MG tablet Take 1 tablet by mouth daily.      diltiazem ER COATED BEADS (CARDIZEM CD) 360 MG 24 hr capsule Take 1 capsule (360 mg) by mouth daily 90 capsule 3    ezetimibe (ZETIA) 10 MG tablet Take 1 tablet (10 mg) by mouth daily 90 tablet 3    hydrALAZINE (APRESOLINE) 50 MG tablet Take 1 tablet (50 mg) by mouth 3 times daily 270 tablet 3    labetalol (NORMODYNE) 100 MG tablet TAKE 1 TABLET BY MOUTH TWICE A  tablet 2    losartan (COZAAR) 100 MG tablet Take 1 tablet (100 mg) by mouth daily 90 tablet 3    omeprazole (PRILOSEC) 40 MG DR capsule Take 1 capsule (40 mg) by mouth daily 90 capsule 3    rosuvastatin (CRESTOR) 40 MG tablet Take 1 tablet (40 mg) by mouth daily 90 tablet 3    vitamin D3 (CHOLECALCIFEROL) 50 mcg (2000 units) tablet Take 1 tablet (50 mcg) by mouth daily           PAST MEDICAL HISTORY:  Past Medical History:   Diagnosis Date    CAD (coronary artery disease)      mild on CT angio    CKD (chronic kidney disease) stage 3, GFR 30-59 ml/min (H)     Colon polyps 2009    DDD (degenerative disc disease), lumbosacral 7/8/2022    Esophageal reflux 4/30/2008    Family history of ischemic heart disease     Fatigue 4/11/2013    Hiatal hernia 2009    History of blood transfusion 4/08    after hiatal hernia surgery    Hyperlipidemia LDL goal <70     Infection due to 2019 novel coronavirus 11/14/2022    Macular  degeneration 1/3/2012    Mild persistent asthma     Nonrheumatic aortic valve stenosis 11/14/2022    Pulmonary hypertension (H) 8/20/2012    mild    Scoliosis     mild     Type 2 diabetes mellitus with diabetic chronic kidney disease  (goal A1C<8) 10/20/2015    Ulcer of the stomach and intestine 2009    Anthony's ulcer    Unspecified essential hypertension     Vitamin D deficiency 1/3/2012         PAST SURGICAL HISTORY:  Past Surgical History:   Procedure Laterality Date    ABDOMEN SURGERY  4/08    hiatal hernia    BREAST SURGERY  1974    biopsy-begign    CARDIAC SURGERY  1995    angiogram    COLONOSCOPY  2008    HERNIA REPAIR  2008    HYSTERECTOMY, IDRIS  1974    Fibroids    ZZC OPEN STOMACH,REPR ULCER,SUTURE  2009    Hiatal hernia, and Anthony's ulcer         FAMILY HISTORY:  Family History   Problem Relation Age of Onset    Heart Disease Mother         Rheumatic fever    Heart Disease Father         CAD, DM, PVD    C.A.D. Father         1st MI in 80s    Hypertension Sister         3 sisters, all with hypertension    Hypertension Sister     Dementia Sister     Family History Negative Son     Family History Negative Daughter     Family History Negative Daughter     C.A.D. Maternal Uncle         age 54 yrs, sudden cardiac    C.A.D. Maternal Uncle         age 55 yrs         SOCIAL HISTORY:  Social History     Socioeconomic History    Marital status:      Spouse name: Not on file    Number of children: 3    Years of education: Not on file    Highest education level: Not on file   Occupational History     Employer: RETIRED   Tobacco Use    Smoking status: Never    Smokeless tobacco: Never   Vaping Use    Vaping status: Never Used   Substance and Sexual Activity    Alcohol use: Yes     Comment: 1-2 drinks week    Drug use: No    Sexual activity: Yes     Partners: Male   Other Topics Concern    Parent/sibling w/ CABG, MI or angioplasty before 65F 55M? No     Service Not Asked    Blood Transfusions Not Asked     Caffeine Concern No     Comment: 1-2 cups daily    Occupational Exposure Not Asked    Hobby Hazards Not Asked    Sleep Concern No     Comment: nocturia    Stress Concern Not Asked    Weight Concern Not Asked    Special Diet Yes     Comment: low salt, limits sugar    Back Care Not Asked    Exercise No     Comment: walking, yardwork, limited due to plantar fascitis    Bike Helmet Not Asked    Seat Belt Not Asked    Self-Exams Not Asked   Social History Narrative    Not on file     Social Drivers of Health     Financial Resource Strain: Low Risk  (3/6/2024)    Financial Resource Strain     Within the past 12 months, have you or your family members you live with been unable to get utilities (heat, electricity) when it was really needed?: No   Food Insecurity: Low Risk  (3/6/2024)    Food Insecurity     Within the past 12 months, did you worry that your food would run out before you got money to buy more?: No     Within the past 12 months, did the food you bought just not last and you didn t have money to get more?: No   Transportation Needs: Low Risk  (3/6/2024)    Transportation Needs     Within the past 12 months, has lack of transportation kept you from medical appointments, getting your medicines, non-medical meetings or appointments, work, or from getting things that you need?: No   Physical Activity: Unknown (3/6/2024)    Exercise Vital Sign     Days of Exercise per Week: 3 days     Minutes of Exercise per Session: Not on file   Stress: No Stress Concern Present (3/6/2024)    [employer] - Occupational Stress Questionnaire     Feeling of Stress : Not at all   Social Connections: Unknown (3/6/2024)    Social Connection and Isolation Panel [NHANES]     Frequency of Communication with Friends and Family: Not on file     Frequency of Social Gatherings with Friends and Family: Once a week     Attends Druze Services: Not on file     Active Member of Clubs or Organizations: Not on file  "    Attends Club or Organization Meetings: Not on file     Marital Status: Not on file   Interpersonal Safety: Low Risk  (3/6/2024)    Interpersonal Safety     Do you feel physically and emotionally safe where you currently live?: Yes     Within the past 12 months, have you been hit, slapped, kicked or otherwise physically hurt by someone?: No     Within the past 12 months, have you been humiliated or emotionally abused in other ways by your partner or ex-partner?: No   Housing Stability: Low Risk  (3/6/2024)    Housing Stability     Do you have housing? : Yes     Are you worried about losing your housing?: No         PHYSICAL EXAMINATION:  Vitals:    10/28/24 1416   BP: 135/62   BP Location: Right arm   Patient Position: Sitting   Cuff Size: Adult Regular   Pulse: 72   SpO2: 98%   Weight: 54.4 kg (120 lb)   Height: 1.575 m (5' 2\")       GENERAL:  No acute distress.  Pleasant and cooperative.   PSYCH:  Normal mood and affect.  HEAD:  Normocephalic.  SPEECH:  No dysarthria.  EYES:  No scleral icterus.  EARS:  Hearing is intact to spoken voice.  NOSE:  Midline, symmetric, no rhinorrhea.  LUNGS:  No respiratory distress.  No increased work of breathing.        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  Careful attention to the numbering convention is recommended prior to any future percutaneous or surgical intervention. Nomenclature is based on twelve thoracic vertebral bodies and 12 paired ribs.  The first nonrib-bearing vertebral body will be labeled L1.     Sagittal balance is 2.9 cm positive.     Lumbar lordosis measures 8.8 degrees. . This was measured from the superior endplate of L1 to the inferior endplate of L5.     Thoracic kyphosis measures 50.9 degrees. . This was measured from the superior endplate of " T1 to the inferior endplate of T12.     Dextroconvex scoliosis of the thoracic spine from the superior endplate of T8 to the inferior plate of T12 measuring 15.8 degrees. Levoconvex scoliosis of the lumbar spine from the superior plate of L1 to the inferior plate of L5 measuring 33.4 degrees.      Coronal balance is 1.1 cm to the left of the CSVL.     No focal consolidation or pleural effusion. Nonobstructive bowel gas pattern. Soft tissues unremarkable.           XR LUMBAR BENDING ONLY 2/3 VIEWS 6/8/2022 2:10 PM     INDICATION: Spinal stenosis of lumbar region with neurogenic claudication  COMPARISON: Scoliosis radiographs performed same day. MRI lumbar spine February 15, 2022.     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  This is different from the literature prior lumbar spine MRI February 15, 2022 Careful attention to the numbering convention is recommended prior to any future percutaneous or surgical intervention.     On flexion there is anterolisthesis of L5 on S1 measuring 5 mm with normal alignment on extension. The vertebral bodies of the lumbar spine otherwise have normal stature and alignment. Anterior marginal osteophytes L1/L2, L2/L3 and L3/L4. Multilevel degenerative facet arthropathy, most pronounced at L3/L4-L5/S1. Anterior marginal osteophytes at L1/L2 and L2/L3. Atherosclerotic vascular disease. Soft tissues otherwise unremarkable.           MRI OF THE LUMBAR SPINE WITHOUT CONTRAST February 15, 2022 3:21 PM      HISTORY: Low back pain with worsening right lower extremity radicular  pain.     TECHNIQUE: Multiplanar, multisequence MRI images of the lumbar spine were acquired without IV contrast.     COMPARISON: None.     FINDINGS: There are five lumbar-type vertebrae for the purposes of this dictation.      Five lumbar type vertebral bodies. Normal vertebral body heights. 25 degree levoscoliosis apex at L2-L3. Mild Modic 1 marrow change in the L3-L4  apposing endplate on the right. The conus tip is identified at L1-L2. Visualized extraspinal soft tissues are within normal limits.     T12-L1: Moderate to severe disc height and T2 signal loss. Mild circumferential disc osteophyte complex. Mild bilateral facet arthropathy. No spinal canal or neural foraminal stenosis.      L1-L2: Moderate to severe disc height and T2 signal loss. Mild posterior annular bulge. Mild bilateral facet arthropathy. No spinal canal or neural foraminal stenosis.     L2-L3: Moderate to severe disc height and T2 signal loss. Mild circumferential disc osteophyte complex. Mild bilateral facet arthropathy and thickening of ligamenta flava. No spinal canal stenosis. No neural foraminal stenosis.     L3-L4: 9 mm leftward subluxation. Moderate disc height and T2 signal loss. Mild-to-moderate circumferential disc osteophyte complex. Mild bilateral facet arthropathy. Moderate to severe spinal canal stenosis. Severe right and mild to moderate left neural foraminal stenosis.      L4-L5: 2 mm degenerative anterolisthesis. Mild disc height loss. Moderate disc T2 signal loss. Mild posterior annular bulge and left foraminal disc osteophyte complex. Moderate bilateral facet arthropathy and thickening of ligamenta flava. Mild/moderate spinal canal stenosis in a trefoil configuration. No right neural foraminal stenosis. Mild left neural foraminal stenosis.     L5-S1: Normal disc height. Moderate disc T2 signal loss. Mild posterior annular bulge. Small caudally directed left central disc herniation. Mild bilateral facet arthropathy. Mild left lateral recess stenosis. No central spinal canal stenosis. No right neural foraminal stenosis. Mild left neural foraminal stenosis.     IMPRESSION:  1.  25 degree levoscoliosis apex at L2-L3.  2.  9 mm L3-L4 leftward subluxation with moderate to severe spinal canal stenosis, severe right neural foraminal stenosis and mild to moderate left neural from stenosis.  3.   Mild/moderate L4-L5 spinal canal stenosis in a trefoil configuration.  4.  Mild left L5-S1 lateral recess stenosis.           ASSESSMENT/PLAN:  Ms. Cristela Salgado is an 84-year-old, right-hand-dominant, female.  S1 is lumbarized.  She has anterolisthesis of L5 on S1 with dynamic instability.  Ms. Cristela Salgado has thoracolumbar scoliosis, lumbosacral facet arthropathy, lumbosacral degenerative disc disease, lumbosacral stenosis with neurogenic claudication.  Ms. Cristela Salgado's left low back pain really seems most consistent with lumbar lumbosacral facet arthropathy.  The pain and weakness in her bilateral lower extremities really seems most consistent with lumbosacral spinal stenosis.  Discussed diagnoses, pathophysiology's, and treatment options with Ms. Cristela Salgado.  Discussed the options of doing nothing/living with it, physical therapy, chiropractic care, core strengthening, oral medication such as gabapentin and pregabalin, lumbosacral facet joint medial branch blocks with following radiofrequency ablations (would plan to target the left L4-L5, L5-S1, and S1-S2 facet joints), lumbosacral interlaminar epidural corticosteroid injection, or referral to spine surgery.  Ms. Cristela Salgado did not feel that gabapentin worked for her in the past.  She would like to consider options.  Ms. Cristela Salgado requested written information on medial branch blocks, and this was provided.  Ms. Cristela Salgado will consider options and contact this clinic after she makes a decision.  Ms. Cristela Salgado like to follow-up in this clinic as needed.        Total Time on encounter:  41 minutes were spent on one more or more of the following:  discussion with patient, history, exam, coordinating care, treatment goals, record review, documenting clinical information, and/or data review.      Samir Mathis MD

## 2024-10-28 NOTE — PATIENT INSTRUCTIONS
For nursing questions, please call [phone].    Please schedule a follow-up appointment as needed.  You can schedule this at the , or you can call [phone].    For medical records, please call [phone].

## 2024-11-01 ENCOUNTER — HOSPITAL ENCOUNTER (OUTPATIENT)
Dept: CARDIOLOGY | Facility: CLINIC | Age: 84
Discharge: HOME | End: 2024-11-01
Attending: NURSE PRACTITIONER | Admitting: NURSE PRACTITIONER
Payer: COMMERCIAL

## 2024-11-01 DIAGNOSIS — I35.0 NONRHEUMATIC AORTIC VALVE STENOSIS: ICD-10-CM

## 2024-11-01 LAB — LVEF ECHO: NORMAL

## 2024-11-01 PROCEDURE — 93306 TTE W/DOPPLER COMPLETE: CPT

## 2024-11-01 PROCEDURE — 93306 TTE W/DOPPLER COMPLETE: CPT | Mod: 26 | Performed by: INTERNAL MEDICINE

## 2024-11-05 ENCOUNTER — OFFICE VISIT (OUTPATIENT)
Dept: CARDIOLOGY | Facility: CLINIC | Age: 84
End: 2024-11-05
Attending: NURSE PRACTITIONER
Payer: COMMERCIAL

## 2024-11-05 VITALS
HEART RATE: 66 BPM | OXYGEN SATURATION: 98 % | WEIGHT: 125.1 LBS | HEIGHT: 62 IN | SYSTOLIC BLOOD PRESSURE: 136 MMHG | BODY MASS INDEX: 23.02 KG/M2 | DIASTOLIC BLOOD PRESSURE: 56 MMHG

## 2024-11-05 DIAGNOSIS — I35.0 NONRHEUMATIC AORTIC VALVE STENOSIS: ICD-10-CM

## 2024-11-05 PROCEDURE — 99215 OFFICE O/P EST HI 40 MIN: CPT | Performed by: INTERNAL MEDICINE

## 2024-11-05 NOTE — PROGRESS NOTES
Cardiology Clinic Progress Note:    November 5, 2024   Patient Name: Cristela Salgado  Patient MRN: [id number]     Consult indication: aortic stenosis     HPI:    I had the opportunity to see patient Cristela Salgado in cardiology clinic for a follow up visit. Patient is followed by our colleague Jose De Jesus Snow MD with Primary Care.     As you know patient is a pleasant 84-year-old female with a past medical history significant for hypertension, nonobstructive CAD, dyslipidemia, type 2 diabetes, spinal stenosis, pulmonary hypertension, mild asthma, contrast allergy, who presents for follow-up.    I am meeting patient for the first time today.  Reviewed prior cardiac history notable for nonobstructive CAD on coronary CTA 2007, echocardiograms demonstrating normal cardiac function however with mild to moderate MR, TR, progressive aortic stenosis, mildly elevated estimated right-sided pressures.  VQ scan negative for chronic pulmonary thromboembolism.  PFT demonstrated mild obstruction with normal lung volumes and DLCO.  Cardiac stress MRI 1/2023 was negative for reversible ischemia, LVEF 79%, RVEF 68%, delayed hyperenhancement demonstrated and non-CAD small scar in the inferoseptum at the RV attachment region, which can be seen in the setting of pulmonary hypertension.    Reviewed TTE 11/1/2024 demonstrating LVEF 55 to 60%, normal RV function, mild to moderate MR, moderate TR, normal estimated RVSP, aortic valve borderline severe with V-max 3.4 m/s, mean gradient 28 mmHg, CHANG 0.94 cm , DI 0.31.  Patient reports that over the past year she has had gradually progressive dyspnea on exertion, activities such as climbing stairs or doing housework has resulted in more dyspnea than in the past.  She also notes occasional mild chest discomfort, though this is not exertional in nature, and occur sporadically, mostly while trying to fall asleep.  She denies any dizziness/lightheadedness, presyncope/syncope.  No  orthopnea/PND.    Assessment and Plan/Recommendations:    # Severe aortic stenosis, symptomatic with dyspnea on exertion  # Non-obstructive CAD on coronary CTA 2007  # Pulm HTN, noted on prior TTE though not on most recent TTE 11/1/2024  # HTN, stable  # HL, on statin and ezetimibe  # Mild asthma   # Contrast allergy  # DM2    - Reviewed prior cardiac history in detail, recent TTE demonstrating progression of aortic stenosis  - Referral to Structural Heart clinic for consideration of TAVR, reviewed history of contrast allergy and requirement for premedication as appropriate   - Continue current cardiac regimen      Thank you for allowing our team to participate in the care of Cristela Salgado.  Please do not hesitate to call or page me with any questions or concerns.    Sincerely,     Elías Mcconnell MD, [facility]  Cardiology  Text Page   November 5, 2024    cc  JESSE Chua CNP  [address]  [place] [address]    Voice recognition software utilized.     Total time spent on this encounter today: Greater than 40 minutes, providing care in this encounter including, but not limited to, reviewing prior medical records, laboratory data, imaging studies, diagnostic studies, procedure notes, formulating an assessment and plan, recommendations, discussion and counseling with patient face to face, dictation.    Past Medical History:     Past Medical History:   Diagnosis Date    CAD (coronary artery disease)      mild on CT angio    CKD (chronic kidney disease) stage 3, GFR 30-59 ml/min (H)     Colon polyps 2009    DDD (degenerative disc disease), lumbosacral 7/8/2022    Esophageal reflux 4/30/2008    Family history of ischemic heart disease     Fatigue 4/11/2013    Hiatal hernia 2009    History of blood transfusion 4/08    after hiatal hernia surgery    Hyperlipidemia LDL goal <70     Infection due to 2019 novel coronavirus 11/14/2022    Macular degeneration 1/3/2012    Mild persistent asthma      Nonrheumatic aortic valve stenosis 11/14/2022    Pulmonary hypertension (H) 8/20/2012    mild    Scoliosis     mild     Type 2 diabetes mellitus with diabetic chronic kidney disease  (goal A1C<8) 10/20/2015    Ulcer of the stomach and intestine 2009    Anthony's ulcer    Unspecified essential hypertension     Vitamin D deficiency 1/3/2012        Past Surgical History:   Past Surgical History:   Procedure Laterality Date    ABDOMEN SURGERY  4/08    hiatal hernia    BREAST SURGERY  1974    biopsy-begign    CARDIAC SURGERY  1995    angiogram    COLONOSCOPY  2008    HERNIA REPAIR  2008    HYSTERECTOMY, IDRIS  1974    Fibroids    ZZC OPEN STOMACH,REPR ULCER,SUTURE  2009    Hiatal hernia, and Anthony's ulcer       Medications (outpatient):  Current Outpatient Medications   Medication Sig Dispense Refill    albuterol (PROAIR HFA/PROVENTIL HFA/VENTOLIN HFA) 108 (90 Base) MCG/ACT inhaler Inhale 2 puffs into the lungs every 6 hours as needed for shortness of breath, wheezing or cough 18 g 11    aspirin 81 MG tablet Take 1 tablet by mouth daily.      diltiazem ER COATED BEADS (CARDIZEM CD) 360 MG 24 hr capsule Take 1 capsule (360 mg) by mouth daily 90 capsule 3    ezetimibe (ZETIA) 10 MG tablet Take 1 tablet (10 mg) by mouth daily 90 tablet 3    hydrALAZINE (APRESOLINE) 50 MG tablet Take 1 tablet (50 mg) by mouth 3 times daily 270 tablet 3    labetalol (NORMODYNE) 100 MG tablet TAKE 1 TABLET BY MOUTH TWICE A  tablet 2    losartan (COZAAR) 100 MG tablet Take 1 tablet (100 mg) by mouth daily 90 tablet 3    omeprazole (PRILOSEC) 40 MG DR capsule Take 1 capsule (40 mg) by mouth daily 90 capsule 3    rosuvastatin (CRESTOR) 40 MG tablet Take 1 tablet (40 mg) by mouth daily 90 tablet 3    vitamin D3 (CHOLECALCIFEROL) 50 mcg (2000 units) tablet Take 1 tablet (50 mcg) by mouth daily         Allergies:  Allergies   Allergen Reactions    Lisinopril Cough    Hydrochlorothiazide      Renal insufficiency    Iodine Hives     Iodine  "contrast    Metformin      Loose stools    Niacin      LEG CRAMPS    Norvasc [Amlodipine Besylate] Swelling    Simvastatin      ACHINESS         Social History:   History   Drug Use No      History   Smoking Status    Never   Smokeless Tobacco    Never     Social History    Substance and Sexual Activity      Alcohol use: Yes        Comment: 1-2 drinks week       Family History:  Family History   Problem Relation Age of Onset    Heart Disease Mother         Rheumatic fever    Heart Disease Father         CAD, DM, PVD    C.A.D. Father         1st MI in 80s    Hypertension Sister         3 sisters, all with hypertension    Hypertension Sister     Dementia Sister     Family History Negative Son     Family History Negative Daughter     Family History Negative Daughter     C.A.D. Maternal Uncle         age 54 yrs, sudden cardiac    C.A.D. Maternal Uncle         age 55 yrs       Review of Systems:   A complete review of systems was negative except as mentioned in the History of Present Illness.     Objective & Physical Exam:  /56 (BP Location: Right arm, Patient Position: Sitting, Cuff Size: Adult Regular)   Pulse 66   Ht 1.575 m (5' 2\")   Wt 56.7 kg (125 lb 1.6 oz)   SpO2 98%   BMI 22.88 kg/m    Wt Readings from Last 2 Encounters:   11/05/24 56.7 kg (125 lb 1.6 oz)   10/28/24 54.4 kg (120 lb)     Body mass index is 22.88 kg/m .   Body surface area is 1.57 meters squared.    Constitutional: appears stated age, in no apparent distress, appears to be well nourished  Pulmonary: clear to auscultation bilaterally, no wheezes, no rales, no increased work of breathing  Cardiovascular: regular rate, 3/6 JANES at the RUSB, no lower extremity edema  Neurologic: awake, alert, moves all extremities  Skin: no jaundice, warm on limited exam    Data reviewed:  Lab Results   Component Value Date    WBC 6.1 02/08/2022    WBC 6.6 05/17/2019    RBC 4.25 02/08/2022    RBC 4.43 05/17/2019    HGB 12.1 02/08/2022    HGB 13.0 07/23/2020 "    HCT 38.1 02/08/2022    HCT 39.4 05/17/2019    MCV 90 02/08/2022    MCV 89 05/17/2019    MCH 28.5 02/08/2022    MCH 30.2 05/17/2019    MCHC 31.8 02/08/2022    MCHC 34.0 05/17/2019    RDW 13.2 02/08/2022    RDW 13.2 05/17/2019     02/08/2022     05/17/2019     Sodium   Date Value Ref Range Status   03/06/2024 141 135 - 145 mmol/L Final     Comment:     Reference intervals for this test were updated on 09/26/2023 to more accurately reflect our healthy population. There may be differences in the flagging of prior results with similar values performed with this method. Interpretation of those prior results can be made in the context of the updated reference intervals.    06/08/2021 135 133 - 144 mmol/L Final     Potassium   Date Value Ref Range Status   03/06/2024 3.9 3.4 - 5.3 mmol/L Final   05/28/2022 4.1 3.4 - 5.3 mmol/L Final   06/08/2021 4.1 3.4 - 5.3 mmol/L Final     Chloride   Date Value Ref Range Status   03/06/2024 109 (H) 98 - 107 mmol/L Final   05/28/2022 109 94 - 109 mmol/L Final   06/08/2021 103 94 - 109 mmol/L Final     Carbon Dioxide   Date Value Ref Range Status   06/08/2021 26 20 - 32 mmol/L Final     Carbon Dioxide (CO2)   Date Value Ref Range Status   03/06/2024 20 (L) 22 - 29 mmol/L Final   05/28/2022 25 20 - 32 mmol/L Final     Anion Gap   Date Value Ref Range Status   03/06/2024 12 7 - 15 mmol/L Final   05/28/2022 5 3 - 14 mmol/L Final   06/08/2021 6 3 - 14 mmol/L Final     Glucose   Date Value Ref Range Status   03/06/2024 135 (H) 70 - 99 mg/dL Final   05/28/2022 134 (H) 70 - 99 mg/dL Final   06/08/2021 124 (H) 70 - 99 mg/dL Final     Urea Nitrogen   Date Value Ref Range Status   03/06/2024 17.6 8.0 - 23.0 mg/dL Final   05/28/2022 13 7 - 30 mg/dL Final   06/08/2021 17 7 - 30 mg/dL Final     Creatinine   Date Value Ref Range Status   03/06/2024 1.05 (H) 0.51 - 0.95 mg/dL Final   06/08/2021 1.23 (H) 0.52 - 1.04 mg/dL Final     GFR Estimate   Date Value Ref Range Status   03/06/2024  53 (L) >60 mL/min/1.73m2 Final   06/08/2021 42 (L) >60 mL/min/[1.73_m2] Final     Comment:     Non  GFR Calc  Starting 12/18/2018, serum creatinine based estimated GFR (eGFR) will be   calculated using the Chronic Kidney Disease Epidemiology Collaboration   (CKD-EPI) equation.       Calcium   Date Value Ref Range Status   03/06/2024 8.9 8.8 - 10.2 mg/dL Final   06/08/2021 8.9 8.5 - 10.1 mg/dL Final     Bilirubin Total   Date Value Ref Range Status   03/06/2024 0.3 <=1.2 mg/dL Final   06/08/2021 0.6 0.2 - 1.3 mg/dL Final     Alkaline Phosphatase   Date Value Ref Range Status   03/06/2024 81 40 - 150 U/L Final     Comment:     Reference intervals for this test were updated on 11/14/2023 to more accurately reflect our healthy population. There may be differences in the flagging of prior results with similar values performed with this method. Interpretation of those prior results can be made in the context of the updated reference intervals.   06/08/2021 84 40 - 150 U/L Final     ALT   Date Value Ref Range Status   03/06/2024 11 0 - 50 U/L Final     Comment:     Reference intervals for this test were updated on 6/12/2023 to more accurately reflect our healthy population. There may be differences in the flagging of prior results with similar values performed with this method. Interpretation of those prior results can be made in the context of the updated reference intervals.     06/08/2021 18 0 - 50 U/L Final     AST   Date Value Ref Range Status   03/06/2024 16 0 - 45 U/L Final     Comment:     Reference intervals for this test were updated on 6/12/2023 to more accurately reflect our healthy population. There may be differences in the flagging of prior results with similar values performed with this method. Interpretation of those prior results can be made in the context of the updated reference intervals.   06/08/2021 15 0 - 45 U/L Final     Recent Labs   Lab Test 03/06/24  1419 09/30/22  0926   CHOL  186 180   HDL 74 76   LDL 90 78   TRIG 111 128      Lab Results   Component Value Date    A1C 7.0 2024    A1C 6.9 2023    A1C 6.7 2022    A1C 6.8 2021    A1C 6.7 2021    A1C 6.9 2021    A1C 6.9 2020    A1C 7.1 2019    A1C 7.0 2018        Recent Results (from the past 4320 hours)   Echocardiogram Complete   Result Value    LVEF  55-60%    [facility]    [id number]  [id number]  [id number]  [id number]^CHRIS^LEXIE^ETTA     [facility]  Echocardiography Laboratory  [address] [place] [address]     Name: MITZI LEZAMA  MRN: [id number]  : 1940  Study Date: 2024 01:24 PM  Age: 84 yrs  Gender: Female  Patient Location: [facility]  Reason For Study: Nonrheumatic aortic valve stenosis  Ordering Physician: LEXIE IZAGUIRRE  Referring Physician: Jose De Jesus Snow  Performed By: Letty Izaguirre RDCS     BSA: 1.5 m2  Height: 62 in  Weight: 120 lb  HR: 58  BP: 147/74 mmHg  ______________________________________________________________________________  Procedure  Complete Echo Adult.  ______________________________________________________________________________  Interpretation Summary     The visual ejection fraction is 55-60%.  Left ventricular systolic function is normal.  Severe valvular aortic stenosis.  There is mild to moderate (1-2+) mitral regurgitation.  There is moderate (2+) tricuspid regurgitation.  ______________________________________________________________________________  Left Ventricle  The left ventricle is normal in size. There is normal left ventricular wall  thickness. Diastolic Doppler findings (E/E' ratio and/or other parameters)  suggest left ventricular filling pressures are increased. Left ventricular  systolic function is normal. The visual ejection fraction is 55-60%. A false  chord is noted (normal variant).     Right Ventricle  The right ventricle is normal in size and function.     Atria  The left atrium is severely dilated. The  right atrium is mildly dilated. There  is no color Doppler evidence of an atrial shunt.     Mitral Valve  There is mild to moderate (1-2+) mitral regurgitation.     Tricuspid Valve  There is moderate (2+) tricuspid regurgitation. The right ventricular systolic  pressure is approximated at 30.7 mmHg plus the right atrial pressure.     Aortic Valve  The aortic valve is trileaflet. There is severe trileaflet aortic sclerosis.  No aortic regurgitation is present. The calculated aortic valve are is 0.89  cm^2. The peak AoV pressure gradient is 47.0 mmHg. The mean AoV pressure  gradient is 28.0 mmHg. Severe valvular aortic stenosis.     Pulmonic Valve  There is mild (1+) pulmonic valvular regurgitation.     Vessels  The aortic root is normal size. Normal size ascending aorta. IVC diameter <2.1  cm collapsing >50% with sniff suggests a normal RA pressure of 3 mmHg.     Pericardium  There is no pericardial effusion.     Rhythm  Sinus rhythm was noted.  ______________________________________________________________________________  MMode/2D Measurements & Calculations  IVSd: 1.1 cm     LVIDd: 4.0 cm  LVIDs: 2.3 cm  LVPWd: 0.89 cm  IVC diam: 1.6 cm  FS: 43.9 %  LV mass(C)d: 128.3 grams  LV mass(C)dI: 83.4 grams/m2  Ao root diam: 2.6 cm  asc Aorta Diam: 3.1 cm  LVOT diam: 2.0 cm  LVOT area: 3.0 cm2  Ao root diam index Ht(cm/m): 1.6  Ao root diam index BSA (cm/m2): 1.7  Asc Ao diam index BSA (cm/m2): 2.0  Asc Ao diam index Ht(cm/m): 2.0  LA Volume (BP): 89.5 ml     LA Volume Index (BP): 58.1 ml/m2  RWT: 0.44  TAPSE: 2.5 cm     Doppler Measurements & Calculations  MV E max jayce: 105.0 cm/sec  MV A max jayce: 106.0 cm/sec  MV E/A: 0.99  MV max P.6 mmHg  MV mean P.1 mmHg  MV V2 VTI: 36.0 cm  MVA(VTI): 2.4 cm2  MV dec time: 0.16 sec  Ao V2 max: 343.0 cm/sec  Ao max P.0 mmHg  Ao V2 mean: 248.0 cm/sec  Ao mean P.0 mmHg  Ao V2 VTI: 97.8 cm  CHANG(I,D): 0.89 cm2  CHANG(V,D): 0.94 cm2  LV V1 max P.6 mmHg  LV V1 max: 107.0  cm/sec  LV V1 VTI: 28.7 cm  SV(LVOT): 86.9 ml  SI(LVOT): 56.5 ml/m2  PA acc time: 0.10 sec  TR max adrian: 277.2 cm/sec  TR max P.7 mmHg  AV Adrian Ratio (DI): 0.31  CHANG Index (cm2/m2): 0.58     E/E' av.2  Lateral E/e': 12.2  Medial E/e': 16.2  RV S Adrian: 19.9 cm/sec     ______________________________________________________________________________  Report approved by: Royce Mendoza 2024 03:33 PM

## 2024-11-05 NOTE — PATIENT INSTRUCTIONS
November 5, 2024    Thank you for allowing our Cardiology team to participate in your care.     Please note the following changes to your heart treatment plan:     Medication changes:   - none      Follow up:  - Follow up with [facility]      For scheduling, please call [phone].      Please contact our team through [employer] or our Nurse Team Voicemail service [phone], or the General Clinic [phone] for any questions or concerns.     If you are having a medical emergency, please call [phone].     Sincerely,    Elías Mcconnell MD, FACC  Cardiology    [facility]

## 2024-11-05 NOTE — LETTER
11/5/2024    Jose De Jesus Snow MD  [address] [facility] [address]    RE: Cristela Salgado       Dear Colleague,     I had the pleasure of seeing Cristela Salgado in the [facility].      Cardiology Clinic Progress Note:    November 5, 2024   Patient Name: Cristela Salgado  Patient MRN: [id number]     Consult indication: aortic stenosis     HPI:    I had the opportunity to see patient Cristela Salgado in cardiology clinic for a follow up visit. Patient is followed by our colleague Jose De Jesus Snow MD with Primary Care.     As you know patient is a pleasant 84-year-old female with a past medical history significant for hypertension, nonobstructive CAD, dyslipidemia, type 2 diabetes, spinal stenosis, pulmonary hypertension, mild asthma, contrast allergy, who presents for follow-up.    I am meeting patient for the first time today.  Reviewed prior cardiac history notable for nonobstructive CAD on coronary CTA 2007, echocardiograms demonstrating normal cardiac function however with mild to moderate MR, TR, progressive aortic stenosis, mildly elevated estimated right-sided pressures.  VQ scan negative for chronic pulmonary thromboembolism.  PFT demonstrated mild obstruction with normal lung volumes and DLCO.  Cardiac stress MRI 1/2023 was negative for reversible ischemia, LVEF 79%, RVEF 68%, delayed hyperenhancement demonstrated and non-CAD small scar in the inferoseptum at the RV attachment region, which can be seen in the setting of pulmonary hypertension.    Reviewed TTE 11/1/2024 demonstrating LVEF 55 to 60%, normal RV function, mild to moderate MR, moderate TR, normal estimated RVSP, aortic valve borderline severe with V-max 3.4 m/s, mean gradient 28 mmHg, CHANG 0.94 cm , DI 0.31.  Patient reports that over the past year she has had gradually progressive dyspnea on exertion, activities such as climbing stairs or doing housework has resulted in more dyspnea than in the past.  She also notes occasional  mild chest discomfort, though this is not exertional in nature, and occur sporadically, mostly while trying to fall asleep.  She denies any dizziness/lightheadedness, presyncope/syncope.  No orthopnea/PND.    Assessment and Plan/Recommendations:    # Severe aortic stenosis, symptomatic with dyspnea on exertion  # Non-obstructive CAD on coronary CTA 2007  # Pulm HTN, noted on prior TTE though not on most recent TTE 11/1/2024  # HTN, stable  # HL, on statin and ezetimibe  # Mild asthma   # Contrast allergy  # DM2    - Reviewed prior cardiac history in detail, recent TTE demonstrating progression of aortic stenosis  - Referral to Structural Heart clinic for consideration of TAVR, reviewed history of contrast allergy and requirement for premedication as appropriate   - Continue current cardiac regimen      Thank you for allowing our team to participate in the care of Cristela Salgado.  Please do not hesitate to call or page me with any questions or concerns.    Sincerely,     Elías Mcconnell MD, [facility]  Cardiology  Text Page   November 5, 2024    cc  JESSE Chua CNP  [address]  [place] [address]    Voice recognition software utilized.     Total time spent on this encounter today: Greater than 40 minutes, providing care in this encounter including, but not limited to, reviewing prior medical records, laboratory data, imaging studies, diagnostic studies, procedure notes, formulating an assessment and plan, recommendations, discussion and counseling with patient face to face, dictation.    Past Medical History:     Past Medical History:   Diagnosis Date     CAD (coronary artery disease)      mild on CT angio     CKD (chronic kidney disease) stage 3, GFR 30-59 ml/min (H)      Colon polyps 2009     DDD (degenerative disc disease), lumbosacral 7/8/2022     Esophageal reflux 4/30/2008     Family history of ischemic heart disease      Fatigue 4/11/2013     Hiatal hernia 2009     History of blood  transfusion 4/08    after hiatal hernia surgery     Hyperlipidemia LDL goal <70      Infection due to 2019 novel coronavirus 11/14/2022     Macular degeneration 1/3/2012     Mild persistent asthma      Nonrheumatic aortic valve stenosis 11/14/2022     Pulmonary hypertension (H) 8/20/2012    mild     Scoliosis     mild      Type 2 diabetes mellitus with diabetic chronic kidney disease  (goal A1C<8) 10/20/2015     Ulcer of the stomach and intestine 2009    Anthony's ulcer     Unspecified essential hypertension      Vitamin D deficiency 1/3/2012        Past Surgical History:   Past Surgical History:   Procedure Laterality Date     ABDOMEN SURGERY  4/08    hiatal hernia     BREAST SURGERY  1974    biopsy-begign     CARDIAC SURGERY  1995    angiogram     COLONOSCOPY  2008     HERNIA REPAIR  2008     HYSTERECTOMY, IDRIS  1974    Fibroids     ZZC OPEN STOMACH,REPR ULCER,SUTURE  2009    Hiatal hernia, and Anthony's ulcer       Medications (outpatient):  Current Outpatient Medications   Medication Sig Dispense Refill     albuterol (PROAIR HFA/PROVENTIL HFA/VENTOLIN HFA) 108 (90 Base) MCG/ACT inhaler Inhale 2 puffs into the lungs every 6 hours as needed for shortness of breath, wheezing or cough 18 g 11     aspirin 81 MG tablet Take 1 tablet by mouth daily.       diltiazem ER COATED BEADS (CARDIZEM CD) 360 MG 24 hr capsule Take 1 capsule (360 mg) by mouth daily 90 capsule 3     ezetimibe (ZETIA) 10 MG tablet Take 1 tablet (10 mg) by mouth daily 90 tablet 3     hydrALAZINE (APRESOLINE) 50 MG tablet Take 1 tablet (50 mg) by mouth 3 times daily 270 tablet 3     labetalol (NORMODYNE) 100 MG tablet TAKE 1 TABLET BY MOUTH TWICE A  tablet 2     losartan (COZAAR) 100 MG tablet Take 1 tablet (100 mg) by mouth daily 90 tablet 3     omeprazole (PRILOSEC) 40 MG DR capsule Take 1 capsule (40 mg) by mouth daily 90 capsule 3     rosuvastatin (CRESTOR) 40 MG tablet Take 1 tablet (40 mg) by mouth daily 90 tablet 3     vitamin D3  "(CHOLECALCIFEROL) 50 mcg (2000 units) tablet Take 1 tablet (50 mcg) by mouth daily         Allergies:  Allergies   Allergen Reactions     Lisinopril Cough     Hydrochlorothiazide      Renal insufficiency     Iodine Hives     Iodine contrast     Metformin      Loose stools     Niacin      LEG CRAMPS     Norvasc [Amlodipine Besylate] Swelling     Simvastatin      ACHINESS         Social History:   History   Drug Use No      History   Smoking Status     Never   Smokeless Tobacco     Never     Social History    Substance and Sexual Activity      Alcohol use: Yes        Comment: 1-2 drinks week       Family History:  Family History   Problem Relation Age of Onset     Heart Disease Mother         Rheumatic fever     Heart Disease Father         CAD, DM, PVD     C.A.D. Father         1st MI in 80s     Hypertension Sister         3 sisters, all with hypertension     Hypertension Sister      Dementia Sister      Family History Negative Son      Family History Negative Daughter      Family History Negative Daughter      C.A.D. Maternal Uncle         age 54 yrs, sudden cardiac     C.A.D. Maternal Uncle         age 55 yrs       Review of Systems:   A complete review of systems was negative except as mentioned in the History of Present Illness.     Objective & Physical Exam:  /56 (BP Location: Right arm, Patient Position: Sitting, Cuff Size: Adult Regular)   Pulse 66   Ht 1.575 m (5' 2\")   Wt 56.7 kg (125 lb 1.6 oz)   SpO2 98%   BMI 22.88 kg/m    Wt Readings from Last 2 Encounters:   11/05/24 56.7 kg (125 lb 1.6 oz)   10/28/24 54.4 kg (120 lb)     Body mass index is 22.88 kg/m .   Body surface area is 1.57 meters squared.    Constitutional: appears stated age, in no apparent distress, appears to be well nourished  Pulmonary: clear to auscultation bilaterally, no wheezes, no rales, no increased work of breathing  Cardiovascular: regular rate, 3/6 JANES at the RUSB, no lower extremity edema  Neurologic: awake, alert, " moves all extremities  Skin: no jaundice, warm on limited exam    Data reviewed:  Lab Results   Component Value Date    WBC 6.1 02/08/2022    WBC 6.6 05/17/2019    RBC 4.25 02/08/2022    RBC 4.43 05/17/2019    HGB 12.1 02/08/2022    HGB 13.0 07/23/2020    HCT 38.1 02/08/2022    HCT 39.4 05/17/2019    MCV 90 02/08/2022    MCV 89 05/17/2019    MCH 28.5 02/08/2022    MCH 30.2 05/17/2019    MCHC 31.8 02/08/2022    MCHC 34.0 05/17/2019    RDW 13.2 02/08/2022    RDW 13.2 05/17/2019     02/08/2022     05/17/2019     Sodium   Date Value Ref Range Status   03/06/2024 141 135 - 145 mmol/L Final     Comment:     Reference intervals for this test were updated on 09/26/2023 to more accurately reflect our healthy population. There may be differences in the flagging of prior results with similar values performed with this method. Interpretation of those prior results can be made in the context of the updated reference intervals.    06/08/2021 135 133 - 144 mmol/L Final     Potassium   Date Value Ref Range Status   03/06/2024 3.9 3.4 - 5.3 mmol/L Final   05/28/2022 4.1 3.4 - 5.3 mmol/L Final   06/08/2021 4.1 3.4 - 5.3 mmol/L Final     Chloride   Date Value Ref Range Status   03/06/2024 109 (H) 98 - 107 mmol/L Final   05/28/2022 109 94 - 109 mmol/L Final   06/08/2021 103 94 - 109 mmol/L Final     Carbon Dioxide   Date Value Ref Range Status   06/08/2021 26 20 - 32 mmol/L Final     Carbon Dioxide (CO2)   Date Value Ref Range Status   03/06/2024 20 (L) 22 - 29 mmol/L Final   05/28/2022 25 20 - 32 mmol/L Final     Anion Gap   Date Value Ref Range Status   03/06/2024 12 7 - 15 mmol/L Final   05/28/2022 5 3 - 14 mmol/L Final   06/08/2021 6 3 - 14 mmol/L Final     Glucose   Date Value Ref Range Status   03/06/2024 135 (H) 70 - 99 mg/dL Final   05/28/2022 134 (H) 70 - 99 mg/dL Final   06/08/2021 124 (H) 70 - 99 mg/dL Final     Urea Nitrogen   Date Value Ref Range Status   03/06/2024 17.6 8.0 - 23.0 mg/dL Final   05/28/2022 13  7 - 30 mg/dL Final   06/08/2021 17 7 - 30 mg/dL Final     Creatinine   Date Value Ref Range Status   03/06/2024 1.05 (H) 0.51 - 0.95 mg/dL Final   06/08/2021 1.23 (H) 0.52 - 1.04 mg/dL Final     GFR Estimate   Date Value Ref Range Status   03/06/2024 53 (L) >60 mL/min/1.73m2 Final   06/08/2021 42 (L) >60 mL/min/[1.73_m2] Final     Comment:     Non  GFR Calc  Starting 12/18/2018, serum creatinine based estimated GFR (eGFR) will be   calculated using the Chronic Kidney Disease Epidemiology Collaboration   (CKD-EPI) equation.       Calcium   Date Value Ref Range Status   03/06/2024 8.9 8.8 - 10.2 mg/dL Final   06/08/2021 8.9 8.5 - 10.1 mg/dL Final     Bilirubin Total   Date Value Ref Range Status   03/06/2024 0.3 <=1.2 mg/dL Final   06/08/2021 0.6 0.2 - 1.3 mg/dL Final     Alkaline Phosphatase   Date Value Ref Range Status   03/06/2024 81 40 - 150 U/L Final     Comment:     Reference intervals for this test were updated on 11/14/2023 to more accurately reflect our healthy population. There may be differences in the flagging of prior results with similar values performed with this method. Interpretation of those prior results can be made in the context of the updated reference intervals.   06/08/2021 84 40 - 150 U/L Final     ALT   Date Value Ref Range Status   03/06/2024 11 0 - 50 U/L Final     Comment:     Reference intervals for this test were updated on 6/12/2023 to more accurately reflect our healthy population. There may be differences in the flagging of prior results with similar values performed with this method. Interpretation of those prior results can be made in the context of the updated reference intervals.     06/08/2021 18 0 - 50 U/L Final     AST   Date Value Ref Range Status   03/06/2024 16 0 - 45 U/L Final     Comment:     Reference intervals for this test were updated on 6/12/2023 to more accurately reflect our healthy population. There may be differences in the flagging of prior  results with similar values performed with this method. Interpretation of those prior results can be made in the context of the updated reference intervals.   2021 15 0 - 45 U/L Final     Recent Labs   Lab Test 24  1419 22  0926   CHOL 186 180   HDL 74 76   LDL 90 78   TRIG 111 128      Lab Results   Component Value Date    A1C 7.0 2024    A1C 6.9 2023    A1C 6.7 2022    A1C 6.8 2021    A1C 6.7 2021    A1C 6.9 2021    A1C 6.9 2020    A1C 7.1 2019    A1C 7.0 2018        Recent Results (from the past 4320 hours)   Echocardiogram Complete   Result Value    LVEF  55-60%    Narrative    [id number]  [id number]  [id number]  [id number]^CHRIS^LEXIE^ETTA     [facility]  Echocardiography Laboratory  [address] [place] [address]     Name: MITZI LEZAMA  MRN: [id number]  : 1940  Study Date: 2024 01:24 PM  Age: 84 yrs  Gender: Female  Patient Location: [facility]  Reason For Study: Nonrheumatic aortic valve stenosis  Ordering Physician: LEXIE IZAGUIRRE  Referring Physician: Jose De Jesus Snow  Performed By: Letty Izaguirre RDCS     BSA: 1.5 m2  Height: 62 in  Weight: 120 lb  HR: 58  BP: 147/74 mmHg  ______________________________________________________________________________  Procedure  Complete Echo Adult.  ______________________________________________________________________________  Interpretation Summary     The visual ejection fraction is 55-60%.  Left ventricular systolic function is normal.  Severe valvular aortic stenosis.  There is mild to moderate (1-2+) mitral regurgitation.  There is moderate (2+) tricuspid regurgitation.  ______________________________________________________________________________  Left Ventricle  The left ventricle is normal in size. There is normal left ventricular wall  thickness. Diastolic Doppler findings (E/E' ratio and/or other parameters)  suggest left ventricular filling pressures are increased.  Left ventricular  systolic function is normal. The visual ejection fraction is 55-60%. A false  chord is noted (normal variant).     Right Ventricle  The right ventricle is normal in size and function.     Atria  The left atrium is severely dilated. The right atrium is mildly dilated. There  is no color Doppler evidence of an atrial shunt.     Mitral Valve  There is mild to moderate (1-2+) mitral regurgitation.     Tricuspid Valve  There is moderate (2+) tricuspid regurgitation. The right ventricular systolic  pressure is approximated at 30.7 mmHg plus the right atrial pressure.     Aortic Valve  The aortic valve is trileaflet. There is severe trileaflet aortic sclerosis.  No aortic regurgitation is present. The calculated aortic valve are is 0.89  cm^2. The peak AoV pressure gradient is 47.0 mmHg. The mean AoV pressure  gradient is 28.0 mmHg. Severe valvular aortic stenosis.     Pulmonic Valve  There is mild (1+) pulmonic valvular regurgitation.     Vessels  The aortic root is normal size. Normal size ascending aorta. IVC diameter <2.1  cm collapsing >50% with sniff suggests a normal RA pressure of 3 mmHg.     Pericardium  There is no pericardial effusion.     Rhythm  Sinus rhythm was noted.  ______________________________________________________________________________  MMode/2D Measurements & Calculations  IVSd: 1.1 cm     LVIDd: 4.0 cm  LVIDs: 2.3 cm  LVPWd: 0.89 cm  IVC diam: 1.6 cm  FS: 43.9 %  LV mass(C)d: 128.3 grams  LV mass(C)dI: 83.4 grams/m2  Ao root diam: 2.6 cm  asc Aorta Diam: 3.1 cm  LVOT diam: 2.0 cm  LVOT area: 3.0 cm2  Ao root diam index Ht(cm/m): 1.6  Ao root diam index BSA (cm/m2): 1.7  Asc Ao diam index BSA (cm/m2): 2.0  Asc Ao diam index Ht(cm/m): 2.0  LA Volume (BP): 89.5 ml     LA Volume Index (BP): 58.1 ml/m2  RWT: 0.44  TAPSE: 2.5 cm     Doppler Measurements & Calculations  MV E max jayce: 105.0 cm/sec  MV A max jayce: 106.0 cm/sec  MV E/A: 0.99  MV max P.6 mmHg  MV mean P.1 mmHg  MV  V2 VTI: 36.0 cm  MVA(VTI): 2.4 cm2  MV dec time: 0.16 sec  Ao V2 max: 343.0 cm/sec  Ao max P.0 mmHg  Ao V2 mean: 248.0 cm/sec  Ao mean P.0 mmHg  Ao V2 VTI: 97.8 cm  CHANG(I,D): 0.89 cm2  CHANG(V,D): 0.94 cm2  LV V1 max P.6 mmHg  LV V1 max: 107.0 cm/sec  LV V1 VTI: 28.7 cm  SV(LVOT): 86.9 ml  SI(LVOT): 56.5 ml/m2  PA acc time: 0.10 sec  TR max adrian: 277.2 cm/sec  TR max P.7 mmHg  AV Adrian Ratio (DI): 0.31  CHANG Index (cm2/m2): 0.58     E/E' av.2  Lateral E/e': 12.2  Medial E/e': 16.2  RV S Adrian: 19.9 cm/sec     ______________________________________________________________________________  Report approved by: Royce Mendoza 2024 03:33 PM              Thank you for allowing me to participate in the care of your patient.      Sincerely,     Elías Mcconnell MD     [facility]  cc:   Maricarmen Izaguirre, JESSE CNP  [address]  [place] [id number]

## 2024-11-06 ENCOUNTER — TELEPHONE (OUTPATIENT)
Dept: CARDIOLOGY | Facility: CLINIC | Age: 84
End: 2024-11-06
Payer: COMMERCIAL

## 2024-11-06 NOTE — TELEPHONE ENCOUNTER
Structural Heart Clinic [facility]    TAVR referral received from:  Dr. Mcconnell     Chart and recent lab results reviewed.  TAVR CT: Will wait until after valve clinic visit due to contrast allergy  Contrast allergy: Yes, known contrast allergy. Will require pre-treatment prior to testing once scheduled.   Contacted patient to introduce self, provide education on aortic stenosis.   Support/living situation:  Krzysztof   Arranged TAVR consult with Dr. Cheek  Provided direct contact information.     Asuncion Coyle RN  Structural Heart Coordinator  [facility]

## 2024-11-13 DIAGNOSIS — N18.31 STAGE 3A CHRONIC KIDNEY DISEASE (H): Primary | ICD-10-CM

## 2024-11-21 ENCOUNTER — LAB (OUTPATIENT)
Dept: LAB | Facility: CLINIC | Age: 84
End: 2024-11-21
Payer: COMMERCIAL

## 2024-11-21 ENCOUNTER — OFFICE VISIT (OUTPATIENT)
Dept: CARDIOLOGY | Facility: CLINIC | Age: 84
End: 2024-11-21
Payer: COMMERCIAL

## 2024-11-21 VITALS
OXYGEN SATURATION: 99 % | SYSTOLIC BLOOD PRESSURE: 140 MMHG | HEART RATE: 70 BPM | BODY MASS INDEX: 22.63 KG/M2 | HEIGHT: 62 IN | DIASTOLIC BLOOD PRESSURE: 65 MMHG | WEIGHT: 123 LBS | RESPIRATION RATE: 18 BRPM

## 2024-11-21 DIAGNOSIS — N18.31 STAGE 3A CHRONIC KIDNEY DISEASE (H): ICD-10-CM

## 2024-11-21 DIAGNOSIS — I35.0 NONRHEUMATIC AORTIC VALVE STENOSIS: Primary | ICD-10-CM

## 2024-11-21 DIAGNOSIS — I35.0 NONRHEUMATIC AORTIC VALVE STENOSIS: ICD-10-CM

## 2024-11-21 LAB
ANION GAP SERPL CALCULATED.3IONS-SCNC: 10 MMOL/L (ref 7–15)
BUN SERPL-MCNC: 17.5 MG/DL (ref 8–23)
CALCIUM SERPL-MCNC: 9.3 MG/DL (ref 8.8–10.4)
CHLORIDE SERPL-SCNC: 106 MMOL/L (ref 98–107)
CREAT SERPL-MCNC: 1.21 MG/DL (ref 0.51–0.95)
EGFRCR SERPLBLD CKD-EPI 2021: 44 ML/MIN/1.73M2
ERYTHROCYTE [DISTWIDTH] IN BLOOD BY AUTOMATED COUNT: 15.6 % (ref 10–15)
GLUCOSE SERPL-MCNC: 207 MG/DL (ref 70–99)
HCO3 SERPL-SCNC: 25 MMOL/L (ref 22–29)
HCT VFR BLD AUTO: 32.5 % (ref 35–47)
HGB BLD-MCNC: 10 G/DL (ref 11.7–15.7)
MCH RBC QN AUTO: 25.3 PG (ref 26.5–33)
MCHC RBC AUTO-ENTMCNC: 30.8 G/DL (ref 31.5–36.5)
MCV RBC AUTO: 82 FL (ref 78–100)
PLATELET # BLD AUTO: 309 10E3/UL (ref 150–450)
POTASSIUM SERPL-SCNC: 4.1 MMOL/L (ref 3.4–5.3)
RBC # BLD AUTO: 3.95 10E6/UL (ref 3.8–5.2)
SODIUM SERPL-SCNC: 141 MMOL/L (ref 135–145)
WBC # BLD AUTO: 8.9 10E3/UL (ref 4–11)

## 2024-11-21 NOTE — PROGRESS NOTES
Cardiac Surgery Aortic Valve Clinic Note    Date of Service: 11/21/2024    REASON FOR CONSULTATION: severe, symptomatic aortic valve stenosis     SEEN IN CLINIC WITH: Dr. Cheek    Ms. Cristela Salgado is a 84 year old with severe symptomatic aortic stenosis. As a part of the multidisciplinary valve conference, we had a conversation about options for aortic valve intervention, which included TAVR and SAVR.    She follows with Dr. Mcconnell. PMH notable for HTN, CAD, HLD, DM2, pHTN, asthma. Reports worsening Tian over the past year. Overall does well but notices symptoms walking the stairs and doing housework. We discussed that her aortic stenosis has been measured by different metrics as moderate to severe, and that it would be prudent to better characterize the degree of valve dysfunction before proceeding with intervention.    I discussed the technical details of the open surgical AVR with the patient, as well as the expected postoperative course and recovery. The risks include but are not limited to bleeding, infection, stroke, heart or graft failure, dysrhythmia, respiratory failure, kidney or liver injury, bowel or limb ischemia, and death.     Our consensus recommendation was for ongoing evaluation for transcatheter aortic valve replacement. We will plan CAM first. I discussed the technical details. Patient understands that there is a risk of bleeding, infection, stroke, heart block requiring permanent pacemaker, cardiac perforation, aortic root rupture and aortic dissection, in addition to risk of death.     We had a rachael discussion regarding the rare but life threatening complications that can occur during TAVR such as aortic rupture or dissection. These would require emergency conversion to open surgery, which would carry a high risk of mortality (up to 50%) and morbidity, prolonged hospital stay, as well as potential loss of independence. Though the risk of need for surgical conversion would be estimated less  than <1%, it may carry an attendant mortality risk >50%. I explained the morbidity among survivors to include dialysis, stroke, prolonged ventilation, and loss of independent living situation.     Cristela Salgado indicated good insight into the matter and felt strongly that they would like all measures taken to rectify any such intra-operative complication. As such, they would be willing to undergo emergency conversion to open heart surgery in the event of procedural complication. I reassured them that we will proceed with full surgical backup.    1) Recommend ongoing evaluation for TAVR. We will plan to start with CAM to better assess her aortic valve, then planning LHC and CT/A, to be scheduled by the valve clinic  2) CPB standby: full surgical bailout  3) Anemia evaluation    Michael S. Mulvihill, M.D.  Cardiothoracic Surgery      CARDIAC CATHETERIZATION:  Date: pending  No results found.    TRANSTHORACIC ECHOCARDIOGRAPHY:  11/1/2024  Interpretation Summary     The visual ejection fraction is 55-60%.  Left ventricular systolic function is normal.  Severe valvular aortic stenosis.  There is mild to moderate (1-2+) mitral regurgitation.  There is moderate (2+) tricuspid regurgitation.    MR  Regadenoson induced stress perfusion is negative for ischemia.   Normal biventricular size with normal systolic function. Quantitative LVEF 79 %. Quantitative RVEF 68 %.   Delayed hyperenhancement reveals  non CAD, small scar in the inferoseptum at the RV attachment region. This  can be seen in patients with pulmonary hypertension.

## 2024-11-21 NOTE — PROGRESS NOTES
Cardiology Consultation       Assessment & Plan     1.  Aortic stenosis  2.  Nonocclusive coronary artery disease cardiology will be needed with remote CTA 2007  3.  Asthma   4.  Hyperlipidemia  5.  Chronic renal insufficiency    Recommendations    1.  Aortic stenosis:   Patient underwent echocardiogram as noted below.  Per review there are some images that show excursions that appear to be preserved.  To further evaluate this we will get a CAM to make sure that it is indeed stenotic.  Dimensionless index is 0.31.  Mean gradient appears to be similar to what it was last year.  It is 28 mmHg.    2.  If indeed the aortic valve is severely narrowed we will then proceed with TAVR directed CT, coronary angiography and discussion in our valve clinic.  If it is in the moderate range I would recommend continued surveillance and follow-up with PCP regarding assessment of her pulmonary status as contributing to her symptoms.    3.  For today's CBC she has a low hemoglobin.  Her hemoglobin is 10 and MCV of 82 which is less than before.  We will send this to her primary care physician for investigation.  Certainly this could be responsible for her current symptomology.      Thank you kindly for consult      Stacy Cheek MD, MD        HPI:    Patient is an 84-year-old woman who presents for evaluation regarding aortic stenosis.  She has been followed by her PCP and cardiology team regarding this.  More recently they have noticed some symptoms which have progressed over the last year.  She reports of shortness of breath and dyspnea on exertion with moderate activities.  Previously that she was able to perform most chores around the house without difficulty at all.  Never has had any chest pain.  Patient is accompanied by family members and her most recent echocardiogram is as noted below.        Echocardiogram  The visual ejection fraction is 55-60%.  Left ventricular systolic function is normal.  Severe valvular  aortic stenosis.  There is mild to moderate (1-2+) mitral regurgitation.  There is moderate (2+) tricuspid regurgitation.      Primary Care Physician   Jose De Jesus Snow        Patient Active Problem List   Diagnosis    Mild persistent asthma    Esophageal reflux    Macular degeneration    Vitamin D deficiency    Pulmonary hypertension (H)    CAD (coronary artery disease)    Hyperlipidemia LDL goal <70    Colon polyps    Type 2 diabetes mellitus with stage 3 chronic kidney disease, without long-term current use of insulin (H)    Essential hypertension    Cataract of both eyes, unspecified cataract type    Stage 3a chronic kidney disease (H)    Lumbosacral radiculopathy    Lumbosacral stenosis with neurogenic claudication    Scoliosis of thoracolumbar spine, unspecified scoliosis type    DDD (degenerative disc disease), lumbosacral    Facet arthropathy, lumbosacral    Anterolisthesis of lumbosacral spine    Nonrheumatic aortic valve stenosis       Past Medical History   I have reviewed this patient's medical history and updated it with pertinent information if needed.   Past Medical History:   Diagnosis Date    CAD (coronary artery disease)      mild on CT angio    CKD (chronic kidney disease) stage 3, GFR 30-59 ml/min (H)     Colon polyps 2009    DDD (degenerative disc disease), lumbosacral 7/8/2022    Esophageal reflux 4/30/2008    Family history of ischemic heart disease     Fatigue 4/11/2013    Hiatal hernia 2009    History of blood transfusion 4/08    after hiatal hernia surgery    Hyperlipidemia LDL goal <70     Infection due to 2019 novel coronavirus 11/14/2022    Macular degeneration 1/3/2012    Mild persistent asthma     Nonrheumatic aortic valve stenosis 11/14/2022    Pulmonary hypertension (H) 8/20/2012    mild    Scoliosis     mild     Type 2 diabetes mellitus with diabetic chronic kidney disease  (goal A1C<8) 10/20/2015    Ulcer of the stomach and intestine 2009    Anthony's ulcer    Unspecified essential  hypertension     Vitamin D deficiency 1/3/2012       Past Surgical History   I have reviewed this patient's surgical history and updated it with pertinent information if needed.  Past Surgical History:   Procedure Laterality Date    ABDOMEN SURGERY  4/08    hiatal hernia    BREAST SURGERY  1974    biopsy-begign    CARDIAC SURGERY  1995    angiogram    COLONOSCOPY  2008    HERNIA REPAIR  2008    HYSTERECTOMY, IDRIS  1974    Fibroids    ZZC OPEN STOMACH,REPR ULCER,SUTURE  2009    Hiatal hernia, and Anthony's ulcer       Prior to Admission Medications   Cannot display prior to admission medications because the patient has not been admitted in this contact.     [unfilled]  @[email or web address]@  Allergies   Allergies   Allergen Reactions    Lisinopril Cough    Hydrochlorothiazide      Renal insufficiency    Iodine Hives     Iodine contrast    Metformin      Loose stools    Niacin      LEG CRAMPS    Norvasc [Amlodipine Besylate] Swelling    Simvastatin      ACHINESS         Social History    reports that she has never smoked. She has never used smokeless tobacco. She reports current alcohol use. She reports that she does not use drugs.    Family History   Family History   Problem Relation Age of Onset    Heart Disease Mother         Rheumatic fever    Heart Disease Father         CAD, DM, PVD    C.A.D. Father         1st MI in 80s    Hypertension Sister         3 sisters, all with hypertension    Hypertension Sister     Dementia Sister     Family History Negative Son     Family History Negative Daughter     Family History Negative Daughter     C.A.D. Maternal Uncle         age 54 yrs, sudden cardiac    C.A.D. Maternal Uncle         age 55 yrs       Review of Systems   The comprehensive 10 point Review of Systems is negative other than noted in the HPI or here.     Physical Exam   Vital Signs with Ranges     Wt Readings from Last 4 Encounters:   11/05/24 56.7 kg (125 lb 1.6 oz)   10/28/24 54.4 kg (120 lb)   08/28/24 54.4 kg (120  lb)   24 56 kg (123 lb 6.4 oz)     [unfilled]      Vitals: There were no vitals taken for this visit.      @LABRCNTIPR(tropi:5,troponinies:5)@    @LABRCNTIPR(wbc:3,hgb:3,mcv:3,plt:3,inr:3,na:3,potassium:3,chloride:3,co2:3,bun:3,cr:3,gfrestimated:3,gfrestblack:3,aniongap:3,luis:3,glc:3,albumin:2,prottotal:2,bilitotal:2,alkphos:2,alt:2,ast:2,lipase:2,tropi:3)@  Recent Labs   Lab Test 24  1419 22  0926   CHOL 186 180   HDL 74 76   LDL 90 78   TRIG 111 128     @LABRCNTIP(wbc:3,hgb:3,hct:3,mcv:3,plt:3,iron:3,ironsat:3,reticabsct:3,retp:3,feb:3,leif:3,b12:3,folic:3,epoe:3,morph:3)@  @LABRCNTIP(PH:3,PHV:3,PO2:3,PO2V:3,sat:3,PCO2:3,PCO2V:3,HCO3:3,HCO3V:3)@  @LABRCNTIP(NTBNPI:3,NTBNP:3)@  @LABRCNTIP(DD:1)@  @LABRCNTIP(sed:3,crp:3)@  @LABRCNTIP(PLT:3)@  @LABRCNTIP(TSH:3)@  @LABRCNTIP(color:1,appearance:1,urineg,urinebili:1,urineketone:1,s,ubld:1,urineph:1,protein:1,urobilinogen:1,nitrite:1,leukest:1,rbcu:1,wbcu:1)@    Imaging:  No results found for this or any previous visit (from the past 48 hours).    Echo:  No results found for this or any previous visit (from the past 4320 hours).    Clinically Significant Risk Factors Present on Admission                   # Hypertension: Noted on problem list               # Asthma: noted on problem list

## 2024-11-21 NOTE — PROGRESS NOTES
Completed frailty testing and KCCQ.   5 meter walk: 4.9 seconds             5.4 seconds             5.2 seconds  Frailty score: 0/5    KCCQ Results:   1a. 4  1b. 3  1c. 5  2. 5  3. 2  4. 4  5. 5  6. 3  7. 2  8a. 4  8b. 2  8c. 4    Preliminary STS Risk Score: 4.94%  NYHA Class: ll    Plan to proceed with CAM to better assess valve severity. If severe, will proceed with TAVR CT, angiogram and presenting at valve conference. If not severe, will repeat an ECHO in 6 months and follow up with Dr. Snow on other causes for symptoms. Patient stated she will follow up with Dr. Snow on CBC results.     Radha Franklin RN  Structural Heart Coordinator  [facility]

## 2024-11-21 NOTE — LETTER
11/21/2024    Jose De Jesus Snow MD  [address] [facility] [address]    RE: Cristela AZEVEDO Reannastacey       Dear Colleague,     I had the pleasure of seeing Cristela Salgado in the [facility].      Cardiology Consultation       Assessment & Plan    1.  Aortic stenosis  2.  Nonocclusive coronary artery disease cardiology will be needed with remote CTA 2007  3.  Asthma   4.  Hyperlipidemia  5.  Chronic renal insufficiency    Recommendations    1.  Aortic stenosis:   Patient underwent echocardiogram as noted below.  Per review there are some images that show excursions that appear to be preserved.  To further evaluate this we will get a CAM to make sure that it is indeed stenotic.  Dimensionless index is 0.31.  Mean gradient appears to be similar to what it was last year.  It is 28 mmHg.    2.  If indeed the aortic valve is severely narrowed we will then proceed with TAVR directed CT, coronary angiography and discussion in our valve clinic.  If it is in the moderate range I would recommend continued surveillance and follow-up with PCP regarding assessment of her pulmonary status as contributing to her symptoms.    3.  For today's CBC she has a low hemoglobin.  Her hemoglobin is 10 and MCV of 82 which is less than before.  We will send this to her primary care physician for investigation.  Certainly this could be responsible for her current symptomology.      Thank you kindly for consult      Stacy Cheek MD, MD        HPI:    Patient is an 84-year-old woman who presents for evaluation regarding aortic stenosis.  She has been followed by her PCP and cardiology team regarding this.  More recently they have noticed some symptoms which have progressed over the last year.  She reports of shortness of breath and dyspnea on exertion with moderate activities.  Previously that she was able to perform most chores around the house without difficulty at all.  Never has had any chest pain.  Patient is accompanied  by family members and her most recent echocardiogram is as noted below.        Echocardiogram  The visual ejection fraction is 55-60%.  Left ventricular systolic function is normal.  Severe valvular aortic stenosis.  There is mild to moderate (1-2+) mitral regurgitation.  There is moderate (2+) tricuspid regurgitation.      Primary Care Physician  Jose De Jesus Snow        Patient Active Problem List   Diagnosis     Mild persistent asthma     Esophageal reflux     Macular degeneration     Vitamin D deficiency     Pulmonary hypertension (H)     CAD (coronary artery disease)     Hyperlipidemia LDL goal <70     Colon polyps     Type 2 diabetes mellitus with stage 3 chronic kidney disease, without long-term current use of insulin (H)     Essential hypertension     Cataract of both eyes, unspecified cataract type     Stage 3a chronic kidney disease (H)     Lumbosacral radiculopathy     Lumbosacral stenosis with neurogenic claudication     Scoliosis of thoracolumbar spine, unspecified scoliosis type     DDD (degenerative disc disease), lumbosacral     Facet arthropathy, lumbosacral     Anterolisthesis of lumbosacral spine     Nonrheumatic aortic valve stenosis       Past Medical History  I have reviewed this patient's medical history and updated it with pertinent information if needed.   Past Medical History:   Diagnosis Date     CAD (coronary artery disease)      mild on CT angio     CKD (chronic kidney disease) stage 3, GFR 30-59 ml/min (H)      Colon polyps 2009     DDD (degenerative disc disease), lumbosacral 7/8/2022     Esophageal reflux 4/30/2008     Family history of ischemic heart disease      Fatigue 4/11/2013     Hiatal hernia 2009     History of blood transfusion 4/08    after hiatal hernia surgery     Hyperlipidemia LDL goal <70      Infection due to 2019 novel coronavirus 11/14/2022     Macular degeneration 1/3/2012     Mild persistent asthma      Nonrheumatic aortic valve stenosis 11/14/2022     Pulmonary  hypertension (H) 8/20/2012    mild     Scoliosis     mild      Type 2 diabetes mellitus with diabetic chronic kidney disease  (goal A1C<8) 10/20/2015     Ulcer of the stomach and intestine 2009    Anthony's ulcer     Unspecified essential hypertension      Vitamin D deficiency 1/3/2012       Past Surgical History  I have reviewed this patient's surgical history and updated it with pertinent information if needed.  Past Surgical History:   Procedure Laterality Date     ABDOMEN SURGERY  4/08    hiatal hernia     BREAST SURGERY  1974    biopsy-begign     CARDIAC SURGERY  1995    angiogram     COLONOSCOPY  2008     HERNIA REPAIR  2008     HYSTERECTOMY, IDRIS  1974    Fibroids     ZZC OPEN STOMACH,REPR ULCER,SUTURE  2009    Hiatal hernia, and Anthony's ulcer       Prior to Admission Medications  Cannot display prior to admission medications because the patient has not been admitted in this contact.     [unfilled]  [unfilled]  Allergies  Allergies   Allergen Reactions     Lisinopril Cough     Hydrochlorothiazide      Renal insufficiency     Iodine Hives     Iodine contrast     Metformin      Loose stools     Niacin      LEG CRAMPS     Norvasc [Amlodipine Besylate] Swelling     Simvastatin      ACHINESS         Social History   reports that she has never smoked. She has never used smokeless tobacco. She reports current alcohol use. She reports that she does not use drugs.    Family History  Family History   Problem Relation Age of Onset     Heart Disease Mother         Rheumatic fever     Heart Disease Father         CAD, DM, PVD     C.A.D. Father         1st MI in 80s     Hypertension Sister         3 sisters, all with hypertension     Hypertension Sister      Dementia Sister      Family History Negative Son      Family History Negative Daughter      Family History Negative Daughter      C.A.D. Maternal Uncle         age 54 yrs, sudden cardiac     C.A.D. Maternal Uncle         age 55 yrs       Review of Systems  The  comprehensive 10 point Review of Systems is negative other than noted in the HPI or here.     Physical Exam  Vital Signs with Ranges     Wt Readings from Last 4 Encounters:   24 56.7 kg (125 lb 1.6 oz)   10/28/24 54.4 kg (120 lb)   24 54.4 kg (120 lb)   24 56 kg (123 lb 6.4 oz)     [unfilled]      Vitals: There were no vitals taken for this visit.      @LABRCNTIPR(tropi:5,troponinies:5)@    @LABRCNTIPR(wbc:3,hgb:3,mcv:3,plt:3,inr:3,na:3,potassium:3,chloride:3,co2:3,bun:3,cr:3,gfrestimated:3,gfrestblack:3,aniongap:3,luis:3,glc:3,albumin:2,prottotal:2,bilitotal:2,alkphos:2,alt:2,ast:2,lipase:2,tropi:3)@  Recent Labs   Lab Test 24  1419 22  0926   CHOL 186 180   HDL 74 76   LDL 90 78   TRIG 111 128     @LABRCNTIP(wbc:3,hgb:3,hct:3,mcv:3,plt:3,iron:3,ironsat:3,reticabsct:3,retp:3,feb:3,leif:3,b12:3,folic:3,epoe:3,morph:3)@  @LABRCNTIP(PH:3,PHV:3,PO2:3,PO2V:3,sat:3,PCO2:3,PCO2V:3,HCO3:3,HCO3V:3)@  @LABRCNTIP(NTBNPI:3,NTBNP:3)@  @LABRCNTIP(DD:1)@  @LABRCNTIP(sed:3,crp:3)@  @LABRCNTIP(PLT:3)@  @LABRCNTIP(TSH:3)@  @LABRCNTIP(color:1,appearance:1,urineg,urinebili:1,urineketone:1,s,ubld:1,urineph:1,protein:1,urobilinogen:1,nitrite:1,leukest:1,rbcu:1,wbcu:1)@    Imaging:  No results found for this or any previous visit (from the past 48 hours).    Echo:  No results found for this or any previous visit (from the past 4320 hours).    Clinically Significant Risk Factors Present on Admission                  # Hypertension: Noted on problem list               # Asthma: noted on problem list                     Thank you for allowing me to participate in the care of your patient.      Sincerely,     Stacy Cheek MD     [facility]  cc:   Elías Mcconnell MD  [address]  [place] [address]

## 2024-12-02 ENCOUNTER — OFFICE VISIT (OUTPATIENT)
Dept: INTERNAL MEDICINE | Facility: CLINIC | Age: 84
End: 2024-12-02
Payer: COMMERCIAL

## 2024-12-02 VITALS
BODY MASS INDEX: 22.62 KG/M2 | OXYGEN SATURATION: 98 % | TEMPERATURE: 97.6 F | HEIGHT: 62 IN | DIASTOLIC BLOOD PRESSURE: 65 MMHG | SYSTOLIC BLOOD PRESSURE: 109 MMHG | HEART RATE: 71 BPM | WEIGHT: 122.9 LBS

## 2024-12-02 DIAGNOSIS — D64.9 ANEMIA, UNSPECIFIED TYPE: ICD-10-CM

## 2024-12-02 DIAGNOSIS — N18.31 TYPE 2 DIABETES MELLITUS WITH STAGE 3A CHRONIC KIDNEY DISEASE, WITHOUT LONG-TERM CURRENT USE OF INSULIN (H): ICD-10-CM

## 2024-12-02 DIAGNOSIS — E11.22 TYPE 2 DIABETES MELLITUS WITH STAGE 3A CHRONIC KIDNEY DISEASE, WITHOUT LONG-TERM CURRENT USE OF INSULIN (H): ICD-10-CM

## 2024-12-02 DIAGNOSIS — N18.31 STAGE 3A CHRONIC KIDNEY DISEASE (H): ICD-10-CM

## 2024-12-02 DIAGNOSIS — I35.0 SEVERE AORTIC STENOSIS: ICD-10-CM

## 2024-12-02 LAB
ANION GAP SERPL CALCULATED.3IONS-SCNC: 9 MMOL/L (ref 7–15)
BUN SERPL-MCNC: 18.4 MG/DL (ref 8–23)
CALCIUM SERPL-MCNC: 9 MG/DL (ref 8.8–10.4)
CHLORIDE SERPL-SCNC: 107 MMOL/L (ref 98–107)
CREAT SERPL-MCNC: 1.1 MG/DL (ref 0.51–0.95)
EGFRCR SERPLBLD CKD-EPI 2021: 50 ML/MIN/1.73M2
ERYTHROCYTE [SEDIMENTATION RATE] IN BLOOD BY WESTERGREN METHOD: 10 MM/HR (ref 0–30)
EST. AVERAGE GLUCOSE BLD GHB EST-MCNC: 157 MG/DL
FERRITIN SERPL-MCNC: 15 NG/ML (ref 11–328)
GLUCOSE SERPL-MCNC: 181 MG/DL (ref 70–99)
HBA1C MFR BLD: 7.1 % (ref 0–5.6)
HCO3 SERPL-SCNC: 24 MMOL/L (ref 22–29)
HGB BLD-MCNC: 10.1 G/DL (ref 11.7–15.7)
IRON BINDING CAPACITY (ROCHE): 364 UG/DL (ref 240–430)
IRON SATN MFR SERPL: 7 % (ref 15–46)
IRON SERPL-MCNC: 25 UG/DL (ref 37–145)
POTASSIUM SERPL-SCNC: 4.2 MMOL/L (ref 3.4–5.3)
RETICS # AUTO: 0.05 10E6/UL (ref 0.03–0.1)
RETICS/RBC NFR AUTO: 1.3 % (ref 0.5–2)
SODIUM SERPL-SCNC: 140 MMOL/L (ref 135–145)
TOTAL PROTEIN SERUM FOR ELP: 5.7 G/DL (ref 6.4–8.3)
VIT B12 SERPL-MCNC: 280 PG/ML (ref 232–1245)

## 2024-12-02 PROCEDURE — 80048 BASIC METABOLIC PNL TOTAL CA: CPT | Performed by: INTERNAL MEDICINE

## 2024-12-02 PROCEDURE — 82728 ASSAY OF FERRITIN: CPT | Performed by: INTERNAL MEDICINE

## 2024-12-02 PROCEDURE — 85652 RBC SED RATE AUTOMATED: CPT | Performed by: INTERNAL MEDICINE

## 2024-12-02 PROCEDURE — 85045 AUTOMATED RETICULOCYTE COUNT: CPT | Performed by: INTERNAL MEDICINE

## 2024-12-02 PROCEDURE — 82272 OCCULT BLD FECES 1-3 TESTS: CPT | Performed by: INTERNAL MEDICINE

## 2024-12-02 PROCEDURE — 84155 ASSAY OF PROTEIN SERUM: CPT | Performed by: INTERNAL MEDICINE

## 2024-12-02 PROCEDURE — 83036 HEMOGLOBIN GLYCOSYLATED A1C: CPT | Performed by: INTERNAL MEDICINE

## 2024-12-02 PROCEDURE — 84165 PROTEIN E-PHORESIS SERUM: CPT | Performed by: STUDENT IN AN ORGANIZED HEALTH CARE EDUCATION/TRAINING PROGRAM

## 2024-12-02 PROCEDURE — 83550 IRON BINDING TEST: CPT | Performed by: INTERNAL MEDICINE

## 2024-12-02 PROCEDURE — 99207 PR FOOT EXAM NO CHARGE: CPT | Performed by: INTERNAL MEDICINE

## 2024-12-02 PROCEDURE — 82607 VITAMIN B-12: CPT | Performed by: INTERNAL MEDICINE

## 2024-12-02 PROCEDURE — 85018 HEMOGLOBIN: CPT | Performed by: INTERNAL MEDICINE

## 2024-12-02 PROCEDURE — 36415 COLL VENOUS BLD VENIPUNCTURE: CPT | Performed by: INTERNAL MEDICINE

## 2024-12-02 PROCEDURE — 99214 OFFICE O/P EST MOD 30 MIN: CPT | Performed by: INTERNAL MEDICINE

## 2024-12-02 PROCEDURE — 83540 ASSAY OF IRON: CPT | Performed by: INTERNAL MEDICINE

## 2024-12-02 ASSESSMENT — ASTHMA QUESTIONNAIRES
ACT_TOTALSCORE: 20
QUESTION_2 LAST FOUR WEEKS HOW OFTEN HAVE YOU HAD SHORTNESS OF BREATH: MORE THAN ONCE A DAY
QUESTION_1 LAST FOUR WEEKS HOW MUCH OF THE TIME DID YOUR ASTHMA KEEP YOU FROM GETTING AS MUCH DONE AT WORK, SCHOOL OR AT HOME: NONE OF THE TIME
QUESTION_4 LAST FOUR WEEKS HOW OFTEN HAVE YOU USED YOUR RESCUE INHALER OR NEBULIZER MEDICATION (SUCH AS ALBUTEROL): NOT AT ALL
QUESTION_3 LAST FOUR WEEKS HOW OFTEN DID YOUR ASTHMA SYMPTOMS (WHEEZING, COUGHING, SHORTNESS OF BREATH, CHEST TIGHTNESS OR PAIN) WAKE YOU UP AT NIGHT OR EARLIER THAN USUAL IN THE MORNING: NOT AT ALL
ACT_TOTALSCORE: 20
QUESTION_5 LAST FOUR WEEKS HOW WOULD YOU RATE YOUR ASTHMA CONTROL: WELL CONTROLLED

## 2024-12-02 NOTE — PATIENT INSTRUCTIONS
Labs as ordered including stool studies for blood. Return stool kits to the lab as soon as possible  If blood seen in stools,  will then arrange colonoscopy  I would recommend  a  Flu vaccine (influenza risk reduction), updated Pfizer covid booster vaccine, and RSV vaccine (respiratory syncytial virus risk reduction). Get at a pharmacy  Follow-up with cardiology as scheduled   Moisturizer for dry skin of the feet such as Vanicream, Lubriderm, Vaseline, Cerave, etc. May get over-the-counter

## 2024-12-02 NOTE — PROGRESS NOTES
ASSESSMENT:   1. Anemia, unspecified type  Rare blood seen on toilet paper with last about a month ago.  History hemorrhoids the patient denies feeling any external hemorrhoids currently.  MD offered for patient to have rectal examination today including acute stool guaiac card but patient declined rectal exam today..  Previous history colon polyps.  Labs as ordered.  Possible repeat colonoscopy. No melena reported and on PPI  - Occult blood stool; Future  - Hemoglobin; Future  - Iron & Iron Binding Capacity; Future  - Ferritin; Future  - Vitamin B12; Future  - Erythrocyte sedimentation rate auto; Future  - Protein electrophoresis; Future  - Reticulocyte count; Future    2. Stage 3a chronic kidney disease (H)  Minimal worsening with recent lab.  Recheck.  Denies NSAID use  - Basic metabolic panel; Future    3. Type 2 diabetes mellitus with stage 3a chronic kidney disease, without long-term current use of insulin (H)  Not checking blood sugars or following diabetic diet.  Moisturizer for dry skin of feet.  Labs today for follow-up  - FOOT EXAM  - Hemoglobin A1c; Future  - Basic metabolic panel; Future    4. Severe aortic stenosis  Has some mild shortness of breath and fatigue with activity.  No chest pain.  Scheduled for future CAM as part of work-up for possible TAVR. Last cardiac MR January 2023 negative for ischemia      PLAN:   Labs as ordered including stool studies for blood. Return stool kits to the lab as soon as possible  If blood seen in stools,  will then arrange colonoscopy  I would recommend  a  Flu vaccine (influenza risk reduction), updated Pfizer covid booster vaccine, and RSV vaccine (respiratory syncytial virus risk reduction). Get at a pharmacy  Follow-up with cardiology as scheduled   Moisturizer for dry skin of the feet such as Vanicream, Lubriderm, Vaseline, Cerave, etc. May get over-the-counter         Subjective   Cristela is a 84 year old, presenting for the following health  issues:  Consult    History of Present Illness       Heart Failure:  She presents for follow up of heart failure. She is experiencing shortness of breath with rest and activity, which is slightly worse. She is not experiencing any lower extremity edema.   She denies orthopenea and is not coughing at night. Patient is checking weight daily. She has recently had a None.  She has no side effects from medications.  She has had no other medical visits for heart failure since the last visit.    She eats 2-3 servings of fruits and vegetables daily.She consumes 1 sweetened beverage(s) daily.She exercises with enough effort to increase her heart rate 9 or less minutes per day.  She exercises with enough effort to increase her heart rate 3 or less days per week.   She is taking medications regularly.     Last Echo:   Echo result w/o MOPS: Interpretation Summary The visual ejection fraction is 55-60%.Left ventricular systolic function is normal.Severe valvular aortic stenosis.There is mild to moderate (1-2+) mitral regurgitation.There is moderate (2+) tricuspid regurgitation.    Most recent lab results reviewed with pt.      Component      Latest Ref Rng 7/23/2020  9:01 AM 3/6/2024  2:19 PM 3/6/2024  2:26 PM 11/21/2024  2:53 PM   Sodium      135 - 145 mmol/L  141   141    Potassium      3.4 - 5.3 mmol/L  3.9   4.1    Carbon Dioxide (CO2)      22 - 29 mmol/L  20 (L)   25    Anion Gap      7 - 15 mmol/L  12   10    Urea Nitrogen      8.0 - 23.0 mg/dL  17.6   17.5    Creatinine      0.51 - 0.95 mg/dL  1.05 (H)   1.21 (H)    GFR Estimate      >60 mL/min/1.73m2  53 (L)   44 (L)    Calcium      8.8 - 10.4 mg/dL  8.9   9.3    Chloride      98 - 107 mmol/L  109 (H)   106    Glucose      70 - 99 mg/dL  135 (H)   207 (H)    Alkaline Phosphatase      40 - 150 U/L  81      AST      0 - 45 U/L  16      ALT      0 - 50 U/L  11      Protein Total      6.4 - 8.3 g/dL  6.1 (L)      Albumin      3.5 - 5.2 g/dL  4.2      Bilirubin Total       <=1.2 mg/dL  0.3      WBC      4.0 - 11.0 10e3/uL    8.9    RBC Count      3.80 - 5.20 10e6/uL    3.95    Hemoglobin      11.7 - 15.7 g/dL 13.0    10.0 (L)    Hematocrit      35.0 - 47.0 %    32.5 (L)    MCV      78 - 100 fL    82    MCH      26.5 - 33.0 pg    25.3 (L)    MCHC      31.5 - 36.5 g/dL    30.8 (L)    RDW      10.0 - 15.0 %    15.6 (H)    Platelet Count      150 - 450 10e3/uL    309    Cholesterol      <200 mg/dL  186      Triglycerides      <150 mg/dL  111      HDL Cholesterol      >=50 mg/dL  74      LDL Cholesterol Calculated      <=100 mg/dL  90      Non HDL Cholesterol      <130 mg/dL  112      Patient Fasting?  No      Creatinine Urine      mg/dL   135.0     Albumin Urine mg/L      mg/L   17.8     Albumin Urine mg/g Cr      0.00 - 25.00 mg/g Cr   13.19     Hemoglobin A1C      0.0 - 5.6 %  7.0 (H)         Last colonoscopy 2017 showed some internal/external hemorrhoids.  History of polyps.  Repeat screening colonoscopy not recommended at that time due to age.  Rare bright red blood on toilet paper with normal-appearing stools.  Patient last saw this about a month ago.  Normal bowel movements recently without constipation.  History of severe aortic stenosis.  Being worked up by cardiology for possible TAVR.  CAM scheduled.  With labs drawn by cardiology, was noted to have anemia as above.  Patient does have some mild fatigue and mild shortness of breath with exertion but no chest pain.  Sleeping okay.  Denies abdominal pain.  On aspirin 81 mg daily along with PPI.  Not following diabetic diet.  Not checking blood sugars at home.  Due for eye exam.  Patient declines vaccinations today.  Has not had COVID, influenza vaccinations for a few years.  Has not had RSV vaccination       Additional ROS:   Constitutional, HEENT, Cardiovascular, Pulmonary, GI and , Neuro, MSK and Psych review of systems/symptoms are otherwise negative or unchanged from previous, except as noted above.      OBJECTIVE:  /65  "  Pulse 71   Temp 97.6  F (36.4  C) (Temporal)   Ht 1.575 m (5' 2\")   Wt 55.7 kg (122 lb 14.4 oz)   SpO2 98%   BMI 22.48 kg/m     Estimated body mass index is 22.48 kg/m  as calculated from the following:    Height as of this encounter: 1.575 m (5' 2\").    Weight as of this encounter: 55.7 kg (122 lb 14.4 oz).     Neck: no adenopathy. Thyroid normal to palpation. No bruits  Pulm: Lungs clear to auscultation   CV: Regular rates and rhythm. 2/6 JANES RUSB  GI: Soft, nontender, Normal active bowel sounds, No hepatosplenomegaly or masses palpable  Ext: Peripheral pulses intact. No edema.  Skin of feet mildly dry.  No calluses/ulcerations  Neuro: Normal strength and tone, sensory exam grossly normal to light touch sensation feet bilaterally  Rectal:  patient declined examination today     (Chart documentation was completed, in part, with [employer] voice-recognition software. Even though reviewed, some grammatical, spelling, and word errors may remain.)    Jose De Jesus Snow MD  Internal Medicine Department  [facility]            "

## 2024-12-03 ENCOUNTER — DOCUMENTATION ONLY (OUTPATIENT)
Facility: CLINIC | Age: 84
End: 2024-12-03
Payer: COMMERCIAL

## 2024-12-03 LAB
ALBUMIN SERPL ELPH-MCNC: 3.7 G/DL (ref 3.7–5.1)
ALPHA1 GLOB SERPL ELPH-MCNC: 0.3 G/DL (ref 0.2–0.4)
ALPHA2 GLOB SERPL ELPH-MCNC: 0.8 G/DL (ref 0.5–0.9)
B-GLOBULIN SERPL ELPH-MCNC: 0.6 G/DL (ref 0.6–1)
GAMMA GLOB SERPL ELPH-MCNC: 0.3 G/DL (ref 0.7–1.6)
HEMOCCULT STL QL: NEGATIVE
LOCATION OF TASK: ABNORMAL
M PROTEIN SERPL ELPH-MCNC: 0.1 G/DL
PROT PATTERN SERPL ELPH-IMP: ABNORMAL

## 2024-12-03 PROCEDURE — 82272 OCCULT BLD FECES 1-3 TESTS: CPT | Performed by: INTERNAL MEDICINE

## 2024-12-03 NOTE — PROGRESS NOTES
Cristela Salgado has an upcoming lab appointment:    Future Appointments   Date Time Provider Department Center   12/9/2024  8:30 AM [facility]   12/27/2024 12:40 PM [facility]   1/9/2025  1:30 PM [facility]     Patient is scheduled for the following lab(s):   Scheduling and Arrival Information   Scheduling Notes: Labs for hemoglobin per PCP and cardiology   Made On: 11/22/2024 9:38 AM By: HIRAL BRAN     There is no order available. Please review and place either future orders or HMPO (Review of Health Maintenance Protocol Orders), as appropriate.    Health Maintenance Due   Topic    ANNUAL REVIEW OF HM ORDERS      If no labs are needed at this time please have the Care Team contact patient regarding this information and cancel lab appointment. Thank you.     Sarah Beth KWONG

## 2024-12-04 NOTE — PROGRESS NOTES
Tried to call pt and pt was not home at the time. Will attempt to call at another time  Radha GARCIA

## 2024-12-04 NOTE — PROGRESS NOTES
Cristela has a lab only appointment, 12/9/2024. Appointment notes that patient is requesting labs for hgb per PCP. Please place future lab orders.   If you have questions or concerns please have your care team contact the patient directly.   Thanks, Emi

## 2024-12-04 NOTE — PROGRESS NOTES
Patient just had labs done 12/2/2024 when seen in clinic.  Therefore, does not require another lab appointment on 12/9/2024.  Patient may cancel that lab appointment and inform patient.  Also.  Inform patient she will be sent [employer] message tomorrow with recommendations regarding the recent lab testing.  No evidence of blood in her stools

## 2024-12-05 NOTE — PROGRESS NOTES
Left detailed voicemail that 12/9 lab appt will be canceled and that Dr. Snow will message soon about recommendations regarding lab testing per PCP note below. Told pt to call clinic back with any further questions

## 2024-12-09 ENCOUNTER — TELEPHONE (OUTPATIENT)
Dept: INTERNAL MEDICINE | Facility: CLINIC | Age: 84
End: 2024-12-09

## 2024-12-09 DIAGNOSIS — N18.31 TYPE 2 DIABETES MELLITUS WITH STAGE 3A CHRONIC KIDNEY DISEASE, WITHOUT LONG-TERM CURRENT USE OF INSULIN (H): ICD-10-CM

## 2024-12-09 DIAGNOSIS — E11.22 TYPE 2 DIABETES MELLITUS WITH STAGE 3A CHRONIC KIDNEY DISEASE, WITHOUT LONG-TERM CURRENT USE OF INSULIN (H): ICD-10-CM

## 2024-12-09 DIAGNOSIS — D64.9 ANEMIA, UNSPECIFIED TYPE: Primary | ICD-10-CM

## 2024-12-09 NOTE — TELEPHONE ENCOUNTER
Pt requesting provider review lab results from 12/2/24 and advise. Pt is wondering about her iron levels. Does she need to schedule a follow up appointment to discuss replacement? Pt reporting feeling generalized weakness and fatigue.     Pt plans to have cardiac procedure 1/9/24. Thank you!     Can we leave a detailed message on this number? YES  Phone number patient can be reached at: Home number on file [phone] (home)    Jackelin Cuevas RN  [facility] Triage

## 2024-12-10 RX ORDER — MAGNESIUM 200 MG
1 TABLET ORAL DAILY
COMMUNITY
Start: 2024-12-10

## 2024-12-10 NOTE — TELEPHONE ENCOUNTER
Pt has stable anemia. Appears relate to both iron deficiency and B12 deficiency in addition to ACD from CKD with low retic ct. On PPI that could cause reduced absorption iron and B12 over time. Stool neg for blood x3. Last colonoscopy 2017 showed only 2 small 3mm polyps and hemorrhoids so do not fel new colonoscopy necessary and pt also hesitant to undergo repeat colonoscopy  at her age and with current severe aortic stenosis, feel overall risk procedure would be greater than benefit so will not order.  Will have pt start Vitron-C 1 tab daily along with B12 SL 1,000mcg daily and repeat labs 1/6/25.  Will also refer to DM Ed to improve diet as pt not following DM die currently to see benefit and repeat A1C 3 mos first  before committing to medication therapy for  DM control given A1C 7.1 no and age. Intolerant of  Metformin and Jardiance will be too expensive for pt based on attempt recently to also prescribe for pt's  with same insurance. If not able to improve diet and A1C result, will consider very low dose Glimepiride in future. Tried tp call pt libra CASTRO LM with  that I will call  back in a couple hours when she is expected to return home

## 2024-12-12 NOTE — TELEPHONE ENCOUNTER
Results and plan reviewed with patient. [employer] help line phone number provided as patient tried to send message but was unable to do so. Patient/Caller with no further questions or concerns.     Neelima Rodriguez RN

## 2024-12-27 DIAGNOSIS — J45.20 MILD INTERMITTENT ASTHMA WITHOUT COMPLICATION: ICD-10-CM

## 2024-12-27 NOTE — TELEPHONE ENCOUNTER
"Medication passed protocol, however, refill RN could not approve because  last prescription \"local print\".  Provider, please approve or deny the prescription.    "

## 2024-12-29 RX ORDER — ALBUTEROL SULFATE 90 UG/1
2 INHALANT RESPIRATORY (INHALATION) EVERY 6 HOURS PRN
Qty: 18 G | Refills: 11 | Status: SHIPPED | OUTPATIENT
Start: 2024-12-29

## 2025-01-06 ENCOUNTER — LAB (OUTPATIENT)
Dept: LAB | Facility: CLINIC | Age: 85
End: 2025-01-06
Payer: COMMERCIAL

## 2025-01-06 ENCOUNTER — TELEPHONE (OUTPATIENT)
Dept: CARDIOLOGY | Facility: CLINIC | Age: 85
End: 2025-01-06

## 2025-01-06 DIAGNOSIS — D64.9 ANEMIA, UNSPECIFIED TYPE: ICD-10-CM

## 2025-01-06 LAB
FERRITIN SERPL-MCNC: 49 NG/ML (ref 11–328)
HGB BLD-MCNC: 11.5 G/DL (ref 11.7–15.7)
IRON BINDING CAPACITY (ROCHE): 341 UG/DL (ref 240–430)
IRON SATN MFR SERPL: 15 % (ref 15–46)
IRON SERPL-MCNC: 50 UG/DL (ref 37–145)
VIT B12 SERPL-MCNC: 831 PG/ML (ref 232–1245)

## 2025-01-06 PROCEDURE — 82728 ASSAY OF FERRITIN: CPT

## 2025-01-06 PROCEDURE — 83550 IRON BINDING TEST: CPT

## 2025-01-06 PROCEDURE — 83540 ASSAY OF IRON: CPT

## 2025-01-06 PROCEDURE — 82607 VITAMIN B-12: CPT

## 2025-01-06 PROCEDURE — 85018 HEMOGLOBIN: CPT

## 2025-01-06 PROCEDURE — 36415 COLL VENOUS BLD VENIPUNCTURE: CPT

## 2025-01-06 NOTE — TELEPHONE ENCOUNTER
DCCV/CAM prep instructions    Patient is scheduled for a CAM at [facility] - [address], [place] [address] - Main Entrance of the Hospital, on 1/9/25.  Check in time is at 11:30 AM and procedure to follow.    Patient instructed to remain NPO for solid foods and liquids for 8 hours prior to arrival for their CAM or CAM with DCCV.    Patient is not diabetic.     Patient is not taking Digoxin.    Patient is taking not on diuretics.    Pt is not on a SGLT2 inhibitor.    Pt is not on a GLP-1 Agonist    Patient advised to take their other daily medications the morning of the procedure with small sips of water.     Verified patient has someone available to drive them home from the hospital and can stay with them for 24 hours after the procedure.     Patient advised to notify care team with any new COVID like symptoms prior to procedure.    Patient advised to notify care team with any new COVID like symptoms prior to procedure. Day of procedure phone number: [facility] at [phone]    Patient is aware of visitor policy.    Patient expresses understanding of above instructions and denies further questions at this time.      Radha Franklin RN  [facility]

## 2025-01-09 ENCOUNTER — HOSPITAL ENCOUNTER (OUTPATIENT)
Dept: CARDIOLOGY | Facility: CLINIC | Age: 85
End: 2025-01-09
Attending: INTERNAL MEDICINE | Admitting: INTERNAL MEDICINE
Payer: COMMERCIAL

## 2025-01-09 ENCOUNTER — HOSPITAL ENCOUNTER (OUTPATIENT)
Facility: CLINIC | Age: 85
Discharge: HOME | End: 2025-01-09
Admitting: INTERNAL MEDICINE
Payer: COMMERCIAL

## 2025-01-09 VITALS
OXYGEN SATURATION: 96 % | WEIGHT: 120.7 LBS | TEMPERATURE: 98.1 F | SYSTOLIC BLOOD PRESSURE: 128 MMHG | BODY MASS INDEX: 22.21 KG/M2 | DIASTOLIC BLOOD PRESSURE: 69 MMHG | HEIGHT: 62 IN | RESPIRATION RATE: 16 BRPM | HEART RATE: 73 BPM

## 2025-01-09 DIAGNOSIS — I35.0 NONRHEUMATIC AORTIC VALVE STENOSIS: ICD-10-CM

## 2025-01-09 LAB — LVEF ECHO: NORMAL

## 2025-01-09 PROCEDURE — 93320 DOPPLER ECHO COMPLETE: CPT

## 2025-01-09 PROCEDURE — 93325 DOPPLER ECHO COLOR FLOW MAPG: CPT

## 2025-01-09 PROCEDURE — 250N000011 HC RX IP 250 OP 636

## 2025-01-09 PROCEDURE — 999N000183 HC STATISTIC TEE INCLUDES SEDATION

## 2025-01-09 PROCEDURE — 258N000003 HC RX IP 258 OP 636

## 2025-01-09 PROCEDURE — 999N000184 HC STATISTIC TELEMETRY

## 2025-01-09 PROCEDURE — 250N000009 HC RX 250

## 2025-01-09 RX ORDER — LIDOCAINE 50 MG/G
OINTMENT TOPICAL ONCE
Status: COMPLETED | OUTPATIENT
Start: 2025-01-09 | End: 2025-01-09

## 2025-01-09 RX ORDER — NALOXONE HYDROCHLORIDE 0.4 MG/ML
0.4 INJECTION, SOLUTION INTRAMUSCULAR; INTRAVENOUS; SUBCUTANEOUS
Status: DISCONTINUED | OUTPATIENT
Start: 2025-01-09 | End: 2025-01-09 | Stop reason: HOSPADM

## 2025-01-09 RX ORDER — NALOXONE HYDROCHLORIDE 0.4 MG/ML
0.2 INJECTION, SOLUTION INTRAMUSCULAR; INTRAVENOUS; SUBCUTANEOUS
Status: DISCONTINUED | OUTPATIENT
Start: 2025-01-09 | End: 2025-01-09 | Stop reason: HOSPADM

## 2025-01-09 RX ORDER — SODIUM CHLORIDE 9 MG/ML
1000 INJECTION, SOLUTION INTRAVENOUS CONTINUOUS
Status: DISCONTINUED | OUTPATIENT
Start: 2025-01-09 | End: 2025-01-09 | Stop reason: HOSPADM

## 2025-01-09 RX ORDER — FLUMAZENIL 0.1 MG/ML
0.2 INJECTION, SOLUTION INTRAVENOUS
Status: DISCONTINUED | OUTPATIENT
Start: 2025-01-09 | End: 2025-01-09 | Stop reason: HOSPADM

## 2025-01-09 RX ORDER — LIDOCAINE HYDROCHLORIDE 40 MG/ML
1.5 SOLUTION TOPICAL ONCE
Status: COMPLETED | OUTPATIENT
Start: 2025-01-09 | End: 2025-01-09

## 2025-01-09 RX ORDER — DEXTROSE MONOHYDRATE 25 G/50ML
9.5 INJECTION, SOLUTION INTRAVENOUS
Status: DISCONTINUED | OUTPATIENT
Start: 2025-01-09 | End: 2025-01-09 | Stop reason: HOSPADM

## 2025-01-09 RX ORDER — FENTANYL CITRATE 50 UG/ML
25 INJECTION, SOLUTION INTRAMUSCULAR; INTRAVENOUS
Status: DISCONTINUED | OUTPATIENT
Start: 2025-01-09 | End: 2025-01-09 | Stop reason: HOSPADM

## 2025-01-09 RX ORDER — GLYCOPYRROLATE 0.2 MG/ML
0.1 INJECTION, SOLUTION INTRAMUSCULAR; INTRAVENOUS ONCE
Status: COMPLETED | OUTPATIENT
Start: 2025-01-09 | End: 2025-01-09

## 2025-01-09 RX ADMIN — MIDAZOLAM 1 MG: 1 INJECTION INTRAMUSCULAR; INTRAVENOUS at 13:39

## 2025-01-09 RX ADMIN — LIDOCAINE HYDROCHLORIDE 1.5 ML: 40 SOLUTION TOPICAL at 13:04

## 2025-01-09 RX ADMIN — MIDAZOLAM 1 MG: 1 INJECTION INTRAMUSCULAR; INTRAVENOUS at 13:37

## 2025-01-09 RX ADMIN — GLYCOPYRROLATE 0.1 MG: 0.2 INJECTION, SOLUTION INTRAMUSCULAR; INTRAVENOUS at 13:04

## 2025-01-09 RX ADMIN — TOPICAL ANESTHETIC 0.5 ML: 200 SPRAY DENTAL; PERIODONTAL at 13:33

## 2025-01-09 RX ADMIN — FENTANYL CITRATE 25 MCG: 50 INJECTION, SOLUTION INTRAMUSCULAR; INTRAVENOUS at 13:39

## 2025-01-09 RX ADMIN — FENTANYL CITRATE 50 MCG: 50 INJECTION, SOLUTION INTRAMUSCULAR; INTRAVENOUS at 13:37

## 2025-01-09 RX ADMIN — SODIUM CHLORIDE 1000 ML: 9 INJECTION, SOLUTION INTRAVENOUS at 13:03

## 2025-01-09 RX ADMIN — MIDAZOLAM 1 MG: 1 INJECTION INTRAMUSCULAR; INTRAVENOUS at 13:42

## 2025-01-09 ASSESSMENT — ACTIVITIES OF DAILY LIVING (ADL)
ADLS_ACUITY_SCORE: 41

## 2025-01-09 NOTE — PROGRESS NOTES
Care Suites Procedure Nursing Note    Patient Information  Name: Cristela Salgado  Age: 84 year old    Procedure  Procedure: CAM  Procedure start time: 1340  Procedure complete time: 1355  Concerns/abnormal assessment: none  If abnormal assessment, provider notified: N/A  Plan/Other: After consent obtained and time out done a CAM performed using conscious sedation of 3 mg Versed IV and 75 mcg Fentanyl IV, VSS, pt tolerated well.    Micaela Middleton RN

## 2025-01-09 NOTE — PRE-PROCEDURE
GENERAL PRE-PROCEDURE:   Procedure:  CAM  Date/Time:  1/9/2025 1:36 PM    Written consent obtained?: Yes    Risks and benefits: Risks, benefits and alternatives were discussed    Consent given by:  Patient  Patient states understanding of procedure being performed: Yes    Patient's understanding of procedure matches consent: Yes    Procedure consent matches procedure scheduled: Yes    Expected level of sedation:  Moderate  Appropriately NPO:  Yes  ASA Class:  1  Mallampati  :  Grade 1- soft palate, uvula, tonsillar pillars, and posterior pharyngeal wall visible  Lungs:  Lungs clear with good breath sounds bilaterally  Heart:  Normal heart sounds and rate and systolic murmur  History & Physical reviewed:  History and physical reviewed and no updates needed  Statement of review:  I have reviewed the lab findings, diagnostic data, medications, and the plan for sedation

## 2025-01-09 NOTE — DISCHARGE INSTRUCTIONS
CAM  (Transesophageal Echocardiogram)  Discharge Instructions    After you go home:    Have an adult stay with you for 6 hours.       For 24 hours - due to the sedation you received:  Relax and take it easy.  Do NOT make any important or legal decisions.  Do NOT drive or operate machines at home or at work.  Do NOT drink alcohol.    Diet:  You may resume your normal diet, but no scratchy foods for two days.  If your throat is sore, eat cold, bland or soft foods.  You may have heartburn if the tube used in the exam entered your stomach.  If so:   - Do not eat acidic and spicy foods.   - Do not eat three hours before bedtime. Clear liquids are okay.   - When lying down, use two pillows to raise your head.    Medicines:    Take your medications, including blood thinners, unless your provider tells you not to.  If you have stopped any medicines, check with your provider about when to restart them.  You may take Tylenol (Acetaminophen) if your throat is sore.  You may take antacids if you have heartburn.      Follow Up Appointments:    Follow up with your cardiologist at [facility] of patient preference as instructed.  Follow up with your primary care provider as needed.    Call the clinic if:    You have heartburn that is severe or lasts more than 72 hours.  You have a sore throat that feels worse after 72 hours.  You have shortness of breath, neck pain, chest pain, fever, chills, coughing up blood, or other unusual signs.  Questions or concerns      [facility] [place]:    [phone] [facility] (7 days a week)    Or you can contact your provider via My Chart

## 2025-01-09 NOTE — PROGRESS NOTES
Care Suites Admission Nursing Note    Patient Information  Name: Cristela Salgado  Age: 84 year old  Reason for admission: CAM  Care Suites arrival time: 1200    Visitor Information  Name:      Patient Admission/Assessment   Pre-procedure assessment complete: Yes  If abnormal assessment/labs, provider notified: N/A  NPO: Yes  Medications held per instructions/orders: N/A  Consent: deferred  If applicable, pregnancy test status: deferred  Patient oriented to room: Yes  Education/questions answered: Yes  Plan/other:     Discharge Planning  Discharge name/phone number: Zander  [phone]  Overnight post sedation caregiver: zander  Discharge location: home    Micaela Middleton RN

## 2025-01-09 NOTE — PROCEDURES
[facility]    Procedure: CAM    Date/Time: 1/9/2025 2:06 PM    Performed by: Rogerio Perla MD  Authorized by: Rogerio Perla MD      UNIVERSAL PROTOCOL   Site Marked: NA  Prior Images Obtained and Reviewed:  Yes  Required items: Required blood products, implants, devices and special equipment available    Patient identity confirmed:  Verbally with patient  Patient was reevaluated immediately before administering moderate or deep sedation or anesthesia  Confirmation Checklist:  Patient's identity using two indicators  Time out: Immediately prior to the procedure a time out was called    Universal Protocol: the [facility] Universal Protocol was followed      SEDATION  Patient Sedated: Yes    Sedation:  Fentanyl and midazolam  Vital signs: Vital signs monitored during sedation      PROCEDURE  Describe Procedure: CAM Note    Indication: Aortic stenosis    Informed consent was signed by the patient.  A timeout was taken.    Sedation:  Versed 3 mg  Fentanyl 50mcg    Findings:  LV: EF 55%  RV: Normal  LA: No thrombus  Mitral valve: Mild MR  Aortic valve: Moderate stenosis, mean 28 mmHg, DI 0.35  Tricuspid valve: 1-2+ TR  Atrial septum: No PFO by color Doppler  Aorta: Mild to moderate atheroma of the descending aorta    No complications.    Rogerio Perla MD  Cardiology - [facility]  Pager: [phone]  Text Page  January 9, 2025      Patient Tolerance:  Patient tolerated the procedure well with no immediate complications  Length of time physician/provider present for 1:1 monitoring during sedation: 20

## 2025-01-09 NOTE — PROGRESS NOTES
Care Suites Discharge Nursing Note    Patient Information  Name: Cristela Salgado  Age: 84 year old    Discharge Education:  Discharge instructions reviewed: Yes  Additional education/resources provided: no  Patient/patient representative verbalizes understanding: Yes  Patient discharging on new medications: No  Medication education completed: N/A    Discharge Plans:   Discharge location: home  Discharge ride contacted: Yes  Approximate discharge time: 1445    Discharge Criteria:  Discharge criteria met and vital signs stable: Yes    Patient Belongs:  Patient belongings returned to patient: Yes    Micaela Middleton RN

## 2025-02-04 ENCOUNTER — PATIENT OUTREACH (OUTPATIENT)
Dept: CARE COORDINATION | Facility: CLINIC | Age: 85
End: 2025-02-04
Payer: COMMERCIAL

## 2025-02-18 ENCOUNTER — PATIENT OUTREACH (OUTPATIENT)
Dept: CARE COORDINATION | Facility: CLINIC | Age: 85
End: 2025-02-18
Payer: COMMERCIAL

## 2025-03-10 ENCOUNTER — TELEPHONE (OUTPATIENT)
Dept: ORTHOPEDICS | Facility: CLINIC | Age: 85
End: 2025-03-10

## 2025-03-10 ENCOUNTER — OFFICE VISIT (OUTPATIENT)
Dept: ORTHOPEDICS | Facility: CLINIC | Age: 85
End: 2025-03-10
Payer: COMMERCIAL

## 2025-03-10 VITALS
DIASTOLIC BLOOD PRESSURE: 95 MMHG | WEIGHT: 120 LBS | OXYGEN SATURATION: 98 % | SYSTOLIC BLOOD PRESSURE: 162 MMHG | BODY MASS INDEX: 21.26 KG/M2 | HEIGHT: 63 IN | HEART RATE: 87 BPM

## 2025-03-10 DIAGNOSIS — M47.817 FACET ARTHROPATHY, LUMBOSACRAL: ICD-10-CM

## 2025-03-10 DIAGNOSIS — M51.372 DEGENERATION OF INTERVERTEBRAL DISC OF LUMBOSACRAL REGION WITH DISCOGENIC BACK PAIN AND LOWER EXTREMITY PAIN: ICD-10-CM

## 2025-03-10 DIAGNOSIS — M54.17 LUMBOSACRAL RADICULOPATHY: Primary | ICD-10-CM

## 2025-03-10 DIAGNOSIS — M41.9 SCOLIOSIS OF THORACOLUMBAR SPINE, UNSPECIFIED SCOLIOSIS TYPE: ICD-10-CM

## 2025-03-10 DIAGNOSIS — M51.27 LUMBOSACRAL DISC HERNIATION: ICD-10-CM

## 2025-03-10 PROCEDURE — 99214 OFFICE O/P EST MOD 30 MIN: CPT | Performed by: PHYSICAL MEDICINE & REHABILITATION

## 2025-03-10 PROCEDURE — 1125F AMNT PAIN NOTED PAIN PRSNT: CPT | Performed by: PHYSICAL MEDICINE & REHABILITATION

## 2025-03-10 PROCEDURE — 3080F DIAST BP >= 90 MM HG: CPT | Performed by: PHYSICAL MEDICINE & REHABILITATION

## 2025-03-10 PROCEDURE — 3077F SYST BP >= 140 MM HG: CPT | Performed by: PHYSICAL MEDICINE & REHABILITATION

## 2025-03-10 RX ORDER — PREDNISONE 20 MG/1
40 TABLET ORAL DAILY
Qty: 10 TABLET | Refills: 0 | Status: SHIPPED | OUTPATIENT
Start: 2025-03-10 | End: 2025-03-15

## 2025-03-10 ASSESSMENT — PAIN SCALES - GENERAL: PAINLEVEL_OUTOF10: SEVERE PAIN (8)

## 2025-03-10 NOTE — PROGRESS NOTES
PHYSICAL MEDICINE & REHABILITATION / MEDICAL SPINE        Date:  Mar 10, 2025    Name:  Cristela Salgado  YOB: 1940  MRN:  [id number]          CHART REVIEW:  Reviewed 06/08/2022 note from Dipesh Méndez MD (neurosurgery).  Ms. Cristela Salgado had back and leg pain beginning in February 2022.  Pain began when she was walking up stairs, and Ms. Cristela Salgado's right leg went numb, and she was unable to walk anymore.  Ms. Cristela Salgado would occasionally get pain into her left leg.  Pain was worse with turning over in bed and getting up initially.  Pain was worse with transitioning from sitting to standing.  Ms. Cristela Salgado was started on gabapentin and tizanidine.  She was referred to physical therapy.  She was referred to medical spine.  If she were to fail conservative management, surgery would require a multilevel lumbar fusion procedure.          CHIEF COMPLAINT:  Right low back/buttock pain.        HISTORY OF PRESENT ILLNESS:  Ms. Cristela Salgado is an 84-year-old, right-hand-dominant, female.  She has 3 children, 6 grandchildren, and 1 great grandchild.     Ms. Cristela Salgado denied any prior or recent injuries of her mid back, low back, pelvis, hips, thighs, knees, legs, ankles, feet, toes.     Ms. Cristela Salgado denied any personal or family history of autoimmune diseases, rheumatologic diseases, gout, pseudogout.  She denied any personal or family history of neurologic diseases.  Ms. Cristela Salgado denied any personal or family history of inflammatory bowel diseases.     On 07/18/2022, Ms. Cristela Salgado had a fluoroscopic guided right paramedian S1-S2 interlaminar epidural corticosteroid injection.  On 08/12/2022, Ms. Cristela Salgado reported she was no longer have any pain in her right lower extremity.     On 08/29/2022, Ms. Cristela Salgado had medial branch blocks of the bilateral L3-L4 and L4-L5 facet joints.  On 08/28/2024, Ms. Cristela Salgado reported that she did  "not notice any benefit from the medial branch blocks.     On 09/09/2024, Ms. Cristela Salgado had a left paramedian L5-S1 interlaminar epidural corticosteroid injection.  On 10/28/2024, Ms. Cristela Salgado reported 100% benefit from the left paramedian L5-S1 interlaminar epidural corticosteroid injection for 1 week.  She then felt that her pain gradually returned to its baseline in another 1 to 2 weeks.     Ms. Cristela Salgado was last seen in this clinic on 10/28/2023.  She returns to clinic today, 03/10/2025.  She is accompanied to today's appointment by her daughter.  Ms. Cristela Salgado arrived 20 minutes late for today's appointment, so her appointment time was somewhat abbreviated.    Ms. Cristela Salgado developed right low back left buttock pain that radiates into the right posterolateral thigh stopping at the right posterolateral knee beginning on 03/06/2025.  This began without injury or incident.  Pain is nearly constant.  Pain is described as \"sharp, shooting.\"  Pain is worsened by lying down, standing, sitting, walking, transitioning from sitting to standing.  Ms. Cristela Salgado cannot identify any alleviating factors.  Ms. Cristela Salgado is using acetaminophen for pain relief.  Ms. Cristela Salgado states that she had more relief from the epidural corticosteroid injection than she realized.    Ms. Cristela Salgado denies any change or pain with coughing, sneezing.  Valsalva worsens her pain.  Driving worsens her pain.  Ms. Cristela Salgado's pain does not keep her awake nor wake up.    Ms. Cristela Salgado notes generalized weakness.  She denies any catching, locking, popping.  Ms. Cristela Salgado denies any bruising, redness, swelling.  She denies any give way episodes of her lower extremities.  Ms. Cristela Salgado denies any tripping, stumbling, falling.  She has started using a four-wheel walker since this pain episode began.  She uses a four-wheel walker for all mobility.  Ms. Cristela AZEVEDO" Naomi notes numbness, tingling, pins-and-needles in her pain distribution.  She denies any other numbness, tingling, pins-and-needles.  Ms. Cristela Salgado denies any saddle anesthesia, bowel incontinence, bladder incontinence.  She denies any bilateral lower extremities become weak and tired with standing.        ALLERGIES:  Allergies   Allergen Reactions    Lisinopril Cough    Hydrochlorothiazide      Renal insufficiency    Iodine Hives     Iodine contrast    Metformin      Loose stools    Niacin      LEG CRAMPS    Norvasc [Amlodipine Besylate] Swelling    Simvastatin      ACHINESS           MEDICATIONS:  Current Outpatient Medications   Medication Sig Dispense Refill    predniSONE (DELTASONE) 20 MG tablet Take 2 tablets (40 mg) by mouth daily for 5 days. 10 tablet 0    albuterol (PROAIR HFA/PROVENTIL HFA/VENTOLIN HFA) 108 (90 Base) MCG/ACT inhaler Inhale 2 puffs into the lungs every 6 hours as needed for shortness of breath, wheezing or cough. 18 g 11    aspirin 81 MG tablet Take 1 tablet by mouth daily.      Cyanocobalamin (B-12) 1000 MCG SUBL Place 1 tablet under the tongue daily.      diltiazem ER COATED BEADS (CARDIZEM CD) 360 MG 24 hr capsule Take 1 capsule (360 mg) by mouth daily 90 capsule 3    Elemental iron 65 mg Vitamin C 125 mg (VITRON C)  MG TABS tablet Take 1 tablet by mouth daily.      ezetimibe (ZETIA) 10 MG tablet Take 1 tablet (10 mg) by mouth daily 90 tablet 3    hydrALAZINE (APRESOLINE) 50 MG tablet Take 1 tablet (50 mg) by mouth 3 times daily 270 tablet 3    labetalol (NORMODYNE) 100 MG tablet TAKE 1 TABLET BY MOUTH TWICE A  tablet 2    losartan (COZAAR) 100 MG tablet Take 1 tablet (100 mg) by mouth daily 90 tablet 3    omeprazole (PRILOSEC) 40 MG DR capsule Take 1 capsule (40 mg) by mouth daily 90 capsule 3    rosuvastatin (CRESTOR) 40 MG tablet Take 1 tablet (40 mg) by mouth daily 90 tablet 3    vitamin D3 (CHOLECALCIFEROL) 50 mcg (2000 units) tablet Take 1 tablet (50 mcg) by  mouth daily           PAST MEDICAL HISTORY:  Past Medical History:   Diagnosis Date    CAD (coronary artery disease)      mild on CT angio    CKD (chronic kidney disease) stage 3, GFR 30-59 ml/min (H)     Colon polyps 2009    DDD (degenerative disc disease), lumbosacral 7/8/2022    Esophageal reflux 4/30/2008    Family history of ischemic heart disease     Fatigue 4/11/2013    Hiatal hernia 2009    History of blood transfusion 4/08    after hiatal hernia surgery    Hyperlipidemia LDL goal <70     Infection due to 2019 novel coronavirus 11/14/2022    Macular degeneration 1/3/2012    Mild persistent asthma     Nonrheumatic aortic valve stenosis 11/14/2022    Pulmonary hypertension (H) 8/20/2012    mild    Scoliosis     mild     Type 2 diabetes mellitus with diabetic chronic kidney disease  (goal A1C<8) 10/20/2015    Ulcer of the stomach and intestine 2009    Anthony's ulcer    Unspecified essential hypertension     Vitamin D deficiency 1/3/2012         PAST SURGICAL HISTORY:  Past Surgical History:   Procedure Laterality Date    ABDOMEN SURGERY  4/08    hiatal hernia    BREAST SURGERY  1974    biopsy-begign    CARDIAC SURGERY  1995    angiogram    COLONOSCOPY  2008    HERNIA REPAIR  2008    HYSTERECTOMY, IDRIS  1974    Fibroids    ZZC OPEN STOMACH,REPR ULCER,SUTURE  2009    Hiatal hernia, and Anthony's ulcer         FAMILY HISTORY:  Family History   Problem Relation Age of Onset    Heart Disease Mother         Rheumatic fever    Heart Disease Father         CAD, DM, PVD    C.A.D. Father         1st MI in 80s    Hypertension Sister         3 sisters, all with hypertension    Hypertension Sister     Dementia Sister     Family History Negative Son     Family History Negative Daughter     Family History Negative Daughter     C.A.D. Maternal Uncle         age 54 yrs, sudden cardiac    C.A.D. Maternal Uncle         age 55 yrs         SOCIAL HISTORY:  Social History     Socioeconomic History    Marital status:       Spouse name: Not on file    Number of children: 3    Years of education: Not on file    Highest education level: Not on file   Occupational History     Employer: RETIRED   Tobacco Use    Smoking status: Never    Smokeless tobacco: Never   Vaping Use    Vaping status: Never Used   Substance and Sexual Activity    Alcohol use: Yes     Comment: 1-2 drinks week    Drug use: No    Sexual activity: Yes     Partners: Male   Other Topics Concern    Parent/sibling w/ CABG, MI or angioplasty before 65F 55M? No     Service Not Asked    Blood Transfusions Not Asked    Caffeine Concern No     Comment: 1-2 cups daily    Occupational Exposure Not Asked    Hobby Hazards Not Asked    Sleep Concern No     Comment: nocturia    Stress Concern Not Asked    Weight Concern Not Asked    Special Diet Yes     Comment: low salt, limits sugar    Back Care Not Asked    Exercise No     Comment: walking, yardwork, limited due to plantar fascitis    Bike Helmet Not Asked    Seat Belt Not Asked    Self-Exams Not Asked   Social History Narrative    Not on file     Social Drivers of Health     Financial Resource Strain: Low Risk  (3/6/2024)    Financial Resource Strain     Within the past 12 months, have you or your family members you live with been unable to get utilities (heat, electricity) when it was really needed?: No   Food Insecurity: Low Risk  (3/6/2024)    Food Insecurity     Within the past 12 months, did you worry that your food would run out before you got money to buy more?: No     Within the past 12 months, did the food you bought just not last and you didn t have money to get more?: No   Transportation Needs: Low Risk  (3/6/2024)    Transportation Needs     Within the past 12 months, has lack of transportation kept you from medical appointments, getting your medicines, non-medical meetings or appointments, work, or from getting things that you need?: No   Physical Activity: Unknown (3/6/2024)    Exercise Vital Sign     Days of  "Exercise per Week: 3 days     Minutes of Exercise per Session: Not on file   Stress: No Stress Concern Present (3/6/2024)    [employer] - Occupational Stress Questionnaire     Feeling of Stress : Not at all   Social Connections: Unknown (3/6/2024)    Social Connection and Isolation Panel [NHANES]     Frequency of Communication with Friends and Family: Not on file     Frequency of Social Gatherings with Friends and Family: Once a week     Attends Tenriism Services: Not on file     Active Member of Clubs or Organizations: Not on file     Attends Club or Organization Meetings: Not on file     Marital Status: Not on file   Interpersonal Safety: Low Risk  (1/9/2025)    Interpersonal Safety     Do you feel physically and emotionally safe where you currently live?: Yes     Within the past 12 months, have you been hit, slapped, kicked or otherwise physically hurt by someone?: No     Within the past 12 months, have you been humiliated or emotionally abused in other ways by your partner or ex-partner?: No   Housing Stability: Low Risk  (3/6/2024)    Housing Stability     Do you have housing? : Yes     Are you worried about losing your housing?: No         PHYSICAL EXAMINATION:  Vitals:    03/10/25 1413   BP: (!) 162/95   Pulse: 87   SpO2: 98%   Weight: 54.4 kg (120 lb)   Height: 1.588 m (5' 2.5\")       GENERAL:  No acute distress.  Pleasant and cooperative.   PSYCH:  Normal mood and affect.  HEAD:  Normocephalic.  SPEECH:  No dysarthria.  EYES:  No scleral icterus.  EARS:  Hearing is intact to spoken voice.  NOSE:  Midline, symmetric, no rhinorrhea.  LUNGS:  No respiratory distress.  No increased work of breathing.    BALANCE AND GAIT: The patient has a reciprocal gait pattern with right antalgia.  She stands and ambulates with a mild to moderate forward trunk lean.  She has a shortened stride length and decreased pace bilaterally.  She has increased pain with toe walking and heel walking.  She " cannot tandem walk.  Double leg squat is performed with quadriceps dominant pattern and preferential weightbearing on the left lower extremity.    INSPECTION:  There is no erythema or ecchymosis of the low back.  There is thoracic scoliosis convex right and lumbar scoliosis convex left.    LUMBOPELVIC PALPATION:  Lumbar Spinous Processes:  not tender.  Lumbar Paraspinals:  Right not tender.  Left not tender.  Posterior Superior Iliac Spine:  Right moderate tenderness.  Left not tender.  Superior Cluneal Nerves:  Right moderate tenderness.  Left not tender.  Iliac Crest:  Right not tender.  Left not tender.  Sacroiliac Joint:  Right moderate tenderness.  Left not tender.  Hip External Rotators:  Right not tender.  Left not tender.  Greater Trochanter:  Right moderate tenderness.  Left not tender.    THORACOLUMBAR RANGE OF MOTION:  Extension (30 ): Severely reduced range of motion, increases right low back/buttock pain, increases pain radiating to the right lower extremity.  Twisting right with extension:  Severely reduced range of motion, increases right low back/buttock pain, increases pain radiating to the right lower extremity.      Physical exam is limited by the patient's pain.          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  Careful attention to the numbering convention is recommended prior to any future percutaneous or surgical intervention. Nomenclature is based on twelve thoracic vertebral bodies and 12 paired ribs.  The first nonrib-bearing vertebral body will be labeled L1.     Sagittal balance is 2.9 cm positive.     Lumbar lordosis measures 8.8 degrees. . This was measured from the superior endplate of L1 to the inferior endplate of L5.     Thoracic kyphosis measures 50.9 degrees. . This was  measured from the superior endplate of T1 to the inferior endplate of T12.     Dextroconvex scoliosis of the thoracic spine from the superior endplate of T8 to the inferior plate of T12 measuring 15.8 degrees. Levoconvex scoliosis of the lumbar spine from the superior plate of L1 to the inferior plate of L5 measuring 33.4 degrees.      Coronal balance is 1.1 cm to the left of the CSVL.     No focal consolidation or pleural effusion. Nonobstructive bowel gas pattern. Soft tissues unremarkable.           XR LUMBAR BENDING ONLY 2/3 VIEWS 6/8/2022 2:10 PM     INDICATION: Spinal stenosis of lumbar region with neurogenic claudication  COMPARISON: Scoliosis radiographs performed same day. MRI lumbar spine February 15, 2022.     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  This is different from the literature prior lumbar spine MRI February 15, 2022 Careful attention to the numbering convention is recommended prior to any future percutaneous or surgical intervention.     On flexion there is anterolisthesis of L5 on S1 measuring 5 mm with normal alignment on extension. The vertebral bodies of the lumbar spine otherwise have normal stature and alignment. Anterior marginal osteophytes L1/L2, L2/L3 and L3/L4. Multilevel degenerative facet arthropathy, most pronounced at L3/L4-L5/S1. Anterior marginal osteophytes at L1/L2 and L2/L3. Atherosclerotic vascular disease. Soft tissues otherwise unremarkable.           MRI OF THE LUMBAR SPINE WITHOUT CONTRAST February 15, 2022 3:21 PM      HISTORY: Low back pain with worsening right lower extremity radicular  pain.     TECHNIQUE: Multiplanar, multisequence MRI images of the lumbar spine were acquired without IV contrast.     COMPARISON: None.     FINDINGS: There are five lumbar-type vertebrae for the purposes of this dictation.      Five lumbar type vertebral bodies. Normal vertebral body heights. 25 degree levoscoliosis apex at L2-L3.  Mild Modic 1 marrow change in the L3-L4 apposing endplate on the right. The conus tip is identified at L1-L2. Visualized extraspinal soft tissues are within normal limits.     T12-L1: Moderate to severe disc height and T2 signal loss. Mild circumferential disc osteophyte complex. Mild bilateral facet arthropathy. No spinal canal or neural foraminal stenosis.      L1-L2: Moderate to severe disc height and T2 signal loss. Mild posterior annular bulge. Mild bilateral facet arthropathy. No spinal canal or neural foraminal stenosis.     L2-L3: Moderate to severe disc height and T2 signal loss. Mild circumferential disc osteophyte complex. Mild bilateral facet arthropathy and thickening of ligamenta flava. No spinal canal stenosis. No neural foraminal stenosis.     L3-L4: 9 mm leftward subluxation. Moderate disc height and T2 signal loss. Mild-to-moderate circumferential disc osteophyte complex. Mild bilateral facet arthropathy. Moderate to severe spinal canal stenosis. Severe right and mild to moderate left neural foraminal stenosis.      L4-L5: 2 mm degenerative anterolisthesis. Mild disc height loss. Moderate disc T2 signal loss. Mild posterior annular bulge and left foraminal disc osteophyte complex. Moderate bilateral facet arthropathy and thickening of ligamenta flava. Mild/moderate spinal canal stenosis in a trefoil configuration. No right neural foraminal stenosis. Mild left neural foraminal stenosis.     L5-S1: Normal disc height. Moderate disc T2 signal loss. Mild posterior annular bulge. Small caudally directed left central disc herniation. Mild bilateral facet arthropathy. Mild left lateral recess stenosis. No central spinal canal stenosis. No right neural foraminal stenosis. Mild left neural foraminal stenosis.     IMPRESSION:  1.  25 degree levoscoliosis apex at L2-L3.  2.  9 mm L3-L4 leftward subluxation with moderate to severe spinal canal stenosis, severe right neural foraminal stenosis and mild to  moderate left neural from stenosis.  3.  Mild/moderate L4-L5 spinal canal stenosis in a trefoil configuration.  4.  Mild left L5-S1 lateral recess stenosis.          ASSESSMENT/PLAN:  Ms. Cristela Salgado is an 84-year-old, right-hand-dominant, female.  S1 is lumbarized.  She has anterolisthesis of L5 on S1 with dynamic instability.  Ms. Cristela Salgado has thoracolumbar scoliosis, lumbosacral facet arthropathy, lumbosacral degenerative disc disease, lumbosacral spinal stenosis.  She currently describes symptoms of right lumbosacral radiculopathy, but given the limited nature of the physical exam, other options would need to be kept in the differential diagnosis.  Discussed the natural course of disc herniations with Ms. Cristela Salgado and her daughter.  Discussed the options doing nothing/living with it, physical therapy, chiropractic care, short course of oral steroids, MRI of the lumbar spine, epidural steroid injection, referral to neurosurgery.  Ms. Cristela Salgado is being given a prescription for prednisone for 40 mg daily for 5 days.  She is being referred for an MRI of her lumbar spine.  Ms. Cristela Salgado is to follow-up in this clinic after the MRI of her lumbar spine.        Total time on the date of the encounter:  30 minutes were spent on one more or more of the following:  discussion with patient, history, exam, coordinating care, treatment goals, record review, documenting clinical information, and/or data review.      Samir Mathis MD

## 2025-03-10 NOTE — TELEPHONE ENCOUNTER
Appointment     Reason for Call: Patient is requesting to be scheduled sooner than next available    Reason for visit: Experiencing shooting sciatic pain into right leg.     Is this a new or return visit: Return    Have you been treated for this in the past? Yes    Additional comments: Patient last seen Oct 2024. Injection done Sept 2024. Is now needing to use her walker due to extreme pain. Would appreciate a call from care team. Has been added to wait list, but would like to know if anything she can do to help with discomfort. Has not had to use walker since last year and very difficult to get around now.    Could we send this information to you in [employer] or would you prefer to receive a phone call?:   Patient would prefer a phone call   Okay to leave a detailed message?: Yes at Home number on file [phone] (home)

## 2025-03-10 NOTE — PATIENT INSTRUCTIONS
Please schedule a follow-up appointment after your MRI.  You can schedule this at the , or you can call [phone].    Clinic Fax:  [phone].    For nursing questions, please call [phone].    For medical records, please call [phone].    Please schedule your imaging exams (x-rays, CT, MRI).  The [facility] Imaging Scheduling team will call you to schedule your imaging exam in the next 0-3 days.  You can also call them directly at [phone] to schedule your appointment.

## 2025-03-10 NOTE — LETTER
3/10/2025      Cristela Salgado  [address]  [place] [address]      Dear Colleague,    Thank you for referring your patient, Cristela Salgado, to the [facility]. Please see a copy of my visit note below.    PHYSICAL MEDICINE & REHABILITATION / MEDICAL SPINE        Date:  Mar 10, 2025    Name:  Cristela Salgado  YOB: 1940  MRN:  [id number]          CHART REVIEW:  Reviewed 06/08/2022 note from Dipesh Méndez MD (neurosurgery).  Ms. Cristela Salgado had back and leg pain beginning in February 2022.  Pain began when she was walking up stairs, and Ms. Cristela Salgado's right leg went numb, and she was unable to walk anymore.  Ms. Cristela Salgado would occasionally get pain into her left leg.  Pain was worse with turning over in bed and getting up initially.  Pain was worse with transitioning from sitting to standing.  Ms. rCistela Salgado was started on gabapentin and tizanidine.  She was referred to physical therapy.  She was referred to medical spine.  If she were to fail conservative management, surgery would require a multilevel lumbar fusion procedure.          CHIEF COMPLAINT:  Right low back/buttock pain.        HISTORY OF PRESENT ILLNESS:  Ms. Cirstela Salgado is an 84-year-old, right-hand-dominant, female.  She has 3 children, 6 grandchildren, and 1 great grandchild.     Ms. Cristela Salgado denied any prior or recent injuries of her mid back, low back, pelvis, hips, thighs, knees, legs, ankles, feet, toes.     Ms. Cristela Salgado denied any personal or family history of autoimmune diseases, rheumatologic diseases, gout, pseudogout.  She denied any personal or family history of neurologic diseases.  Ms. Cristela Salgado denied any personal or family history of inflammatory bowel diseases.     On 07/18/2022, Ms. Cristela Salgado had a fluoroscopic guided right paramedian S1-S2 interlaminar epidural corticosteroid injection.  On 08/12/2022, Ms.  "Cristela Salgado reported she was no longer have any pain in her right lower extremity.     On 08/29/2022, Ms. Cristela Salgado had medial branch blocks of the bilateral L3-L4 and L4-L5 facet joints.  On 08/28/2024, Ms. Cristela Salgado reported that she did not notice any benefit from the medial branch blocks.     On 09/09/2024, Ms. Cristela Salgado had a left paramedian L5-S1 interlaminar epidural corticosteroid injection.  On 10/28/2024, Ms. Cristela Salgado reported 100% benefit from the left paramedian L5-S1 interlaminar epidural corticosteroid injection for 1 week.  She then felt that her pain gradually returned to its baseline in another 1 to 2 weeks.     Ms. Cristela Salgado was last seen in this clinic on 10/28/2023.  She returns to clinic today, 03/10/2025.  She is accompanied to today's appointment by her daughter.  Ms. Cristela Salgado arrived 20 minutes late for today's appointment, so her appointment time was somewhat abbreviated.    Ms. Cristela Salgado developed right low back left buttock pain that radiates into the right posterolateral thigh stopping at the right posterolateral knee beginning on 03/06/2025.  This began without injury or incident.  Pain is nearly constant.  Pain is described as \"sharp, shooting.\"  Pain is worsened by lying down, standing, sitting, walking, transitioning from sitting to standing.  Ms. Cristela Salgado cannot identify any alleviating factors.  Ms. Cristela Salgado is using acetaminophen for pain relief.  Ms. Cristela Salgado states that she had more relief from the epidural corticosteroid injection than she realized.    Ms. Cristela Salgado denies any change or pain with coughing, sneezing.  Valsalva worsens her pain.  Driving worsens her pain.  Ms. Cristela Salgado's pain does not keep her awake nor wake up.    Ms. Cristela Salgado notes generalized weakness.  She denies any catching, locking, popping.  Ms. Cristela Salgado denies any bruising, redness, swelling.  " She denies any give way episodes of her lower extremities.  Ms. Cristela Salgado denies any tripping, stumbling, falling.  She has started using a four-wheel walker since this pain episode began.  She uses a four-wheel walker for all mobility.  Ms. Cristela Salgado notes numbness, tingling, pins-and-needles in her pain distribution.  She denies any other numbness, tingling, pins-and-needles.  Ms. Cristela Salgado denies any saddle anesthesia, bowel incontinence, bladder incontinence.  She denies any bilateral lower extremities become weak and tired with standing.        ALLERGIES:  Allergies   Allergen Reactions     Lisinopril Cough     Hydrochlorothiazide      Renal insufficiency     Iodine Hives     Iodine contrast     Metformin      Loose stools     Niacin      LEG CRAMPS     Norvasc [Amlodipine Besylate] Swelling     Simvastatin      ACHINESS           MEDICATIONS:  Current Outpatient Medications   Medication Sig Dispense Refill     predniSONE (DELTASONE) 20 MG tablet Take 2 tablets (40 mg) by mouth daily for 5 days. 10 tablet 0     albuterol (PROAIR HFA/PROVENTIL HFA/VENTOLIN HFA) 108 (90 Base) MCG/ACT inhaler Inhale 2 puffs into the lungs every 6 hours as needed for shortness of breath, wheezing or cough. 18 g 11     aspirin 81 MG tablet Take 1 tablet by mouth daily.       Cyanocobalamin (B-12) 1000 MCG SUBL Place 1 tablet under the tongue daily.       diltiazem ER COATED BEADS (CARDIZEM CD) 360 MG 24 hr capsule Take 1 capsule (360 mg) by mouth daily 90 capsule 3     Elemental iron 65 mg Vitamin C 125 mg (VITRON C)  MG TABS tablet Take 1 tablet by mouth daily.       ezetimibe (ZETIA) 10 MG tablet Take 1 tablet (10 mg) by mouth daily 90 tablet 3     hydrALAZINE (APRESOLINE) 50 MG tablet Take 1 tablet (50 mg) by mouth 3 times daily 270 tablet 3     labetalol (NORMODYNE) 100 MG tablet TAKE 1 TABLET BY MOUTH TWICE A  tablet 2     losartan (COZAAR) 100 MG tablet Take 1 tablet (100 mg) by mouth daily 90  tablet 3     omeprazole (PRILOSEC) 40 MG DR capsule Take 1 capsule (40 mg) by mouth daily 90 capsule 3     rosuvastatin (CRESTOR) 40 MG tablet Take 1 tablet (40 mg) by mouth daily 90 tablet 3     vitamin D3 (CHOLECALCIFEROL) 50 mcg (2000 units) tablet Take 1 tablet (50 mcg) by mouth daily           PAST MEDICAL HISTORY:  Past Medical History:   Diagnosis Date     CAD (coronary artery disease)      mild on CT angio     CKD (chronic kidney disease) stage 3, GFR 30-59 ml/min (H)      Colon polyps 2009     DDD (degenerative disc disease), lumbosacral 7/8/2022     Esophageal reflux 4/30/2008     Family history of ischemic heart disease      Fatigue 4/11/2013     Hiatal hernia 2009     History of blood transfusion 4/08    after hiatal hernia surgery     Hyperlipidemia LDL goal <70      Infection due to 2019 novel coronavirus 11/14/2022     Macular degeneration 1/3/2012     Mild persistent asthma      Nonrheumatic aortic valve stenosis 11/14/2022     Pulmonary hypertension (H) 8/20/2012    mild     Scoliosis     mild      Type 2 diabetes mellitus with diabetic chronic kidney disease  (goal A1C<8) 10/20/2015     Ulcer of the stomach and intestine 2009    Anthony's ulcer     Unspecified essential hypertension      Vitamin D deficiency 1/3/2012         PAST SURGICAL HISTORY:  Past Surgical History:   Procedure Laterality Date     ABDOMEN SURGERY  4/08    hiatal hernia     BREAST SURGERY  1974    biopsy-begign     CARDIAC SURGERY  1995    angiogram     COLONOSCOPY  2008     HERNIA REPAIR  2008     HYSTERECTOMY, IDRIS  1974    Fibroids     ZZC OPEN STOMACH,REPR ULCER,SUTURE  2009    Hiatal hernia, and Anthony's ulcer         FAMILY HISTORY:  Family History   Problem Relation Age of Onset     Heart Disease Mother         Rheumatic fever     Heart Disease Father         CAD, DM, PVD     C.A.D. Father         1st MI in 80s     Hypertension Sister         3 sisters, all with hypertension     Hypertension Sister      Dementia Sister       Family History Negative Son      Family History Negative Daughter      Family History Negative Daughter      C.A.D. Maternal Uncle         age 54 yrs, sudden cardiac     C.A.D. Maternal Uncle         age 55 yrs         SOCIAL HISTORY:  Social History     Socioeconomic History     Marital status:      Spouse name: Not on file     Number of children: 3     Years of education: Not on file     Highest education level: Not on file   Occupational History     Employer: RETIRED   Tobacco Use     Smoking status: Never     Smokeless tobacco: Never   Vaping Use     Vaping status: Never Used   Substance and Sexual Activity     Alcohol use: Yes     Comment: 1-2 drinks week     Drug use: No     Sexual activity: Yes     Partners: Male   Other Topics Concern     Parent/sibling w/ CABG, MI or angioplasty before 65F 55M? No      Service Not Asked     Blood Transfusions Not Asked     Caffeine Concern No     Comment: 1-2 cups daily     Occupational Exposure Not Asked     Hobby Hazards Not Asked     Sleep Concern No     Comment: nocturia     Stress Concern Not Asked     Weight Concern Not Asked     Special Diet Yes     Comment: low salt, limits sugar     Back Care Not Asked     Exercise No     Comment: walking, yardwork, limited due to plantar fascitis     Bike Helmet Not Asked     Seat Belt Not Asked     Self-Exams Not Asked   Social History Narrative     Not on file     Social Drivers of Health     Financial Resource Strain: Low Risk  (3/6/2024)    Financial Resource Strain      Within the past 12 months, have you or your family members you live with been unable to get utilities (heat, electricity) when it was really needed?: No   Food Insecurity: Low Risk  (3/6/2024)    Food Insecurity      Within the past 12 months, did you worry that your food would run out before you got money to buy more?: No      Within the past 12 months, did the food you bought just not last and you didn t have money to get more?: No  "  Transportation Needs: Low Risk  (3/6/2024)    Transportation Needs      Within the past 12 months, has lack of transportation kept you from medical appointments, getting your medicines, non-medical meetings or appointments, work, or from getting things that you need?: No   Physical Activity: Unknown (3/6/2024)    Exercise Vital Sign      Days of Exercise per Week: 3 days      Minutes of Exercise per Session: Not on file   Stress: No Stress Concern Present (3/6/2024)    [employer] - Occupational Stress Questionnaire      Feeling of Stress : Not at all   Social Connections: Unknown (3/6/2024)    Social Connection and Isolation Panel [NHANES]      Frequency of Communication with Friends and Family: Not on file      Frequency of Social Gatherings with Friends and Family: Once a week      Attends Spiritism Services: Not on file      Active Member of Clubs or Organizations: Not on file      Attends Club or Organization Meetings: Not on file      Marital Status: Not on file   Interpersonal Safety: Low Risk  (1/9/2025)    Interpersonal Safety      Do you feel physically and emotionally safe where you currently live?: Yes      Within the past 12 months, have you been hit, slapped, kicked or otherwise physically hurt by someone?: No      Within the past 12 months, have you been humiliated or emotionally abused in other ways by your partner or ex-partner?: No   Housing Stability: Low Risk  (3/6/2024)    Housing Stability      Do you have housing? : Yes      Are you worried about losing your housing?: No         PHYSICAL EXAMINATION:  Vitals:    03/10/25 1413   BP: (!) 162/95   Pulse: 87   SpO2: 98%   Weight: 54.4 kg (120 lb)   Height: 1.588 m (5' 2.5\")       GENERAL:  No acute distress.  Pleasant and cooperative.   PSYCH:  Normal mood and affect.  HEAD:  Normocephalic.  SPEECH:  No dysarthria.  EYES:  No scleral icterus.  EARS:  Hearing is intact to spoken voice.  NOSE:  Midline, symmetric, no " rhinorrhea.  LUNGS:  No respiratory distress.  No increased work of breathing.    BALANCE AND GAIT: The patient has a reciprocal gait pattern with right antalgia.  She stands and ambulates with a mild to moderate forward trunk lean.  She has a shortened stride length and decreased pace bilaterally.  She has increased pain with toe walking and heel walking.  She cannot tandem walk.  Double leg squat is performed with quadriceps dominant pattern and preferential weightbearing on the left lower extremity.    INSPECTION:  There is no erythema or ecchymosis of the low back.  There is thoracic scoliosis convex right and lumbar scoliosis convex left.    LUMBOPELVIC PALPATION:  Lumbar Spinous Processes:  not tender.  Lumbar Paraspinals:  Right not tender.  Left not tender.  Posterior Superior Iliac Spine:  Right moderate tenderness.  Left not tender.  Superior Cluneal Nerves:  Right moderate tenderness.  Left not tender.  Iliac Crest:  Right not tender.  Left not tender.  Sacroiliac Joint:  Right moderate tenderness.  Left not tender.  Hip External Rotators:  Right not tender.  Left not tender.  Greater Trochanter:  Right moderate tenderness.  Left not tender.    THORACOLUMBAR RANGE OF MOTION:  Extension (30 ): Severely reduced range of motion, increases right low back/buttock pain, increases pain radiating to the right lower extremity.  Twisting right with extension:  Severely reduced range of motion, increases right low back/buttock pain, increases pain radiating to the right lower extremity.      Physical exam is limited by the patient's pain.          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  Careful attention to the numbering convention is recommended prior to any future percutaneous or surgical intervention.  Nomenclature is based on twelve thoracic vertebral bodies and 12 paired ribs.  The first nonrib-bearing vertebral body will be labeled L1.     Sagittal balance is 2.9 cm positive.     Lumbar lordosis measures 8.8 degrees. . This was measured from the superior endplate of L1 to the inferior endplate of L5.     Thoracic kyphosis measures 50.9 degrees. . This was measured from the superior endplate of T1 to the inferior endplate of T12.     Dextroconvex scoliosis of the thoracic spine from the superior endplate of T8 to the inferior plate of T12 measuring 15.8 degrees. Levoconvex scoliosis of the lumbar spine from the superior plate of L1 to the inferior plate of L5 measuring 33.4 degrees.      Coronal balance is 1.1 cm to the left of the CSVL.     No focal consolidation or pleural effusion. Nonobstructive bowel gas pattern. Soft tissues unremarkable.           XR LUMBAR BENDING ONLY 2/3 VIEWS 6/8/2022 2:10 PM     INDICATION: Spinal stenosis of lumbar region with neurogenic claudication  COMPARISON: Scoliosis radiographs performed same day. MRI lumbar spine February 15, 2022.     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  This is different from the literature prior lumbar spine MRI February 15, 2022 Careful attention to the numbering convention is recommended prior to any future percutaneous or surgical intervention.     On flexion there is anterolisthesis of L5 on S1 measuring 5 mm with normal alignment on extension. The vertebral bodies of the lumbar spine otherwise have normal stature and alignment. Anterior marginal osteophytes L1/L2, L2/L3 and L3/L4. Multilevel degenerative facet arthropathy, most pronounced at L3/L4-L5/S1. Anterior marginal osteophytes at L1/L2 and L2/L3. Atherosclerotic vascular disease. Soft tissues otherwise unremarkable.           MRI OF THE LUMBAR SPINE WITHOUT CONTRAST February 15, 2022 3:21 PM      HISTORY: Low back pain with worsening right  lower extremity radicular  pain.     TECHNIQUE: Multiplanar, multisequence MRI images of the lumbar spine were acquired without IV contrast.     COMPARISON: None.     FINDINGS: There are five lumbar-type vertebrae for the purposes of this dictation.      Five lumbar type vertebral bodies. Normal vertebral body heights. 25 degree levoscoliosis apex at L2-L3. Mild Modic 1 marrow change in the L3-L4 apposing endplate on the right. The conus tip is identified at L1-L2. Visualized extraspinal soft tissues are within normal limits.     T12-L1: Moderate to severe disc height and T2 signal loss. Mild circumferential disc osteophyte complex. Mild bilateral facet arthropathy. No spinal canal or neural foraminal stenosis.      L1-L2: Moderate to severe disc height and T2 signal loss. Mild posterior annular bulge. Mild bilateral facet arthropathy. No spinal canal or neural foraminal stenosis.     L2-L3: Moderate to severe disc height and T2 signal loss. Mild circumferential disc osteophyte complex. Mild bilateral facet arthropathy and thickening of ligamenta flava. No spinal canal stenosis. No neural foraminal stenosis.     L3-L4: 9 mm leftward subluxation. Moderate disc height and T2 signal loss. Mild-to-moderate circumferential disc osteophyte complex. Mild bilateral facet arthropathy. Moderate to severe spinal canal stenosis. Severe right and mild to moderate left neural foraminal stenosis.      L4-L5: 2 mm degenerative anterolisthesis. Mild disc height loss. Moderate disc T2 signal loss. Mild posterior annular bulge and left foraminal disc osteophyte complex. Moderate bilateral facet arthropathy and thickening of ligamenta flava. Mild/moderate spinal canal stenosis in a trefoil configuration. No right neural foraminal stenosis. Mild left neural foraminal stenosis.     L5-S1: Normal disc height. Moderate disc T2 signal loss. Mild posterior annular bulge. Small caudally directed left central disc herniation. Mild bilateral  facet arthropathy. Mild left lateral recess stenosis. No central spinal canal stenosis. No right neural foraminal stenosis. Mild left neural foraminal stenosis.     IMPRESSION:  1.  25 degree levoscoliosis apex at L2-L3.  2.  9 mm L3-L4 leftward subluxation with moderate to severe spinal canal stenosis, severe right neural foraminal stenosis and mild to moderate left neural from stenosis.  3.  Mild/moderate L4-L5 spinal canal stenosis in a trefoil configuration.  4.  Mild left L5-S1 lateral recess stenosis.          ASSESSMENT/PLAN:  Ms. Cristela Salgado is an 84-year-old, right-hand-dominant, female.  S1 is lumbarized.  She has anterolisthesis of L5 on S1 with dynamic instability.  Ms. Cristela Salgado has thoracolumbar scoliosis, lumbosacral facet arthropathy, lumbosacral degenerative disc disease, lumbosacral spinal stenosis.  She currently describes symptoms of right lumbosacral radiculopathy, but given the limited nature of the physical exam, other options would need to be kept in the differential diagnosis.  Discussed the natural course of disc herniations with Ms. Cristela Salgado and her daughter.  Discussed the options doing nothing/living with it, physical therapy, chiropractic care, short course of oral steroids, MRI of the lumbar spine, epidural steroid injection, referral to neurosurgery.  Ms. Cristela Salgado is being given a prescription for prednisone for 40 mg daily for 5 days.  She is being referred for an MRI of her lumbar spine.  Ms. Cristela Salgado is to follow-up in this clinic after the MRI of her lumbar spine.        Total time on the date of the encounter:  30 minutes were spent on one more or more of the following:  discussion with patient, history, exam, coordinating care, treatment goals, record review, documenting clinical information, and/or data review.      Samir Mathis MD        Again, thank you for allowing me to participate in the care of your patient.         Sincerely,        Samir Mathis MD    Electronically signed

## 2025-03-20 ENCOUNTER — ANCILLARY PROCEDURE (OUTPATIENT)
Dept: MRI IMAGING | Facility: CLINIC | Age: 85
End: 2025-03-20
Attending: PHYSICAL MEDICINE & REHABILITATION
Payer: COMMERCIAL

## 2025-03-20 DIAGNOSIS — M51.27 LUMBOSACRAL DISC HERNIATION: ICD-10-CM

## 2025-03-20 DIAGNOSIS — M54.17 LUMBOSACRAL RADICULOPATHY: ICD-10-CM

## 2025-03-20 DIAGNOSIS — M51.372 DEGENERATION OF INTERVERTEBRAL DISC OF LUMBOSACRAL REGION WITH DISCOGENIC BACK PAIN AND LOWER EXTREMITY PAIN: ICD-10-CM

## 2025-03-20 DIAGNOSIS — M47.817 FACET ARTHROPATHY, LUMBOSACRAL: ICD-10-CM

## 2025-03-20 DIAGNOSIS — M41.9 SCOLIOSIS OF THORACOLUMBAR SPINE, UNSPECIFIED SCOLIOSIS TYPE: ICD-10-CM

## 2025-03-20 PROCEDURE — 72148 MRI LUMBAR SPINE W/O DYE: CPT

## 2025-03-22 DIAGNOSIS — E78.5 HYPERLIPIDEMIA LDL GOAL <70: ICD-10-CM

## 2025-03-22 DIAGNOSIS — I10 ESSENTIAL HYPERTENSION: ICD-10-CM

## 2025-03-25 RX ORDER — LABETALOL 100 MG/1
100 TABLET, FILM COATED ORAL 2 TIMES DAILY
Qty: 180 TABLET | Refills: 1 | Status: SHIPPED | OUTPATIENT
Start: 2025-03-25

## 2025-03-25 RX ORDER — LOSARTAN POTASSIUM 100 MG/1
100 TABLET ORAL DAILY
Qty: 90 TABLET | Refills: 1 | Status: SHIPPED | OUTPATIENT
Start: 2025-03-25

## 2025-03-25 RX ORDER — DILTIAZEM HYDROCHLORIDE 360 MG/1
360 CAPSULE, EXTENDED RELEASE ORAL DAILY
Qty: 90 CAPSULE | Refills: 1 | Status: SHIPPED | OUTPATIENT
Start: 2025-03-25

## 2025-03-25 RX ORDER — HYDRALAZINE HYDROCHLORIDE 50 MG/1
50 TABLET, FILM COATED ORAL 3 TIMES DAILY
Qty: 270 TABLET | Refills: 1 | Status: SHIPPED | OUTPATIENT
Start: 2025-03-25

## 2025-03-25 RX ORDER — ROSUVASTATIN CALCIUM 40 MG/1
40 TABLET, COATED ORAL DAILY
Qty: 90 TABLET | Refills: 1 | Status: SHIPPED | OUTPATIENT
Start: 2025-03-25

## 2025-03-25 NOTE — TELEPHONE ENCOUNTER
BP elevated with ortho appt March 2025 with back OK. Normal Dec 2024 with cardiology visit. Has follow-up with cardiology in July. Meds RFd for 6 mos

## 2025-04-08 ENCOUNTER — OFFICE VISIT (OUTPATIENT)
Dept: ORTHOPEDICS | Facility: CLINIC | Age: 85
End: 2025-04-08
Payer: COMMERCIAL

## 2025-04-08 VITALS
OXYGEN SATURATION: 98 % | HEART RATE: 72 BPM | BODY MASS INDEX: 22.08 KG/M2 | HEIGHT: 62 IN | DIASTOLIC BLOOD PRESSURE: 75 MMHG | WEIGHT: 120 LBS | SYSTOLIC BLOOD PRESSURE: 135 MMHG

## 2025-04-08 DIAGNOSIS — M47.817 FACET ARTHROPATHY, LUMBOSACRAL: ICD-10-CM

## 2025-04-08 DIAGNOSIS — M41.9 SCOLIOSIS OF THORACOLUMBAR SPINE, UNSPECIFIED SCOLIOSIS TYPE: ICD-10-CM

## 2025-04-08 DIAGNOSIS — M48.07 LUMBOSACRAL STENOSIS WITH NEUROGENIC CLAUDICATION: ICD-10-CM

## 2025-04-08 DIAGNOSIS — M54.17 LUMBOSACRAL RADICULOPATHY: ICD-10-CM

## 2025-04-08 DIAGNOSIS — M51.372 DEGENERATION OF INTERVERTEBRAL DISC OF LUMBOSACRAL REGION WITH DISCOGENIC BACK PAIN AND LOWER EXTREMITY PAIN: ICD-10-CM

## 2025-04-08 PROCEDURE — 3078F DIAST BP <80 MM HG: CPT | Performed by: PHYSICAL MEDICINE & REHABILITATION

## 2025-04-08 PROCEDURE — 1125F AMNT PAIN NOTED PAIN PRSNT: CPT | Performed by: PHYSICAL MEDICINE & REHABILITATION

## 2025-04-08 PROCEDURE — 99214 OFFICE O/P EST MOD 30 MIN: CPT | Performed by: PHYSICAL MEDICINE & REHABILITATION

## 2025-04-08 PROCEDURE — 3075F SYST BP GE 130 - 139MM HG: CPT | Performed by: PHYSICAL MEDICINE & REHABILITATION

## 2025-04-08 ASSESSMENT — PAIN SCALES - GENERAL: PAINLEVEL_OUTOF10: MILD PAIN (1)

## 2025-04-08 NOTE — PATIENT INSTRUCTIONS
Please schedule a follow-up appointment in 2 months.  You can schedule this at the , or you can call [phone].    Clinic Fax:  [phone].    For nursing questions, please call [phone].    For medical records, please call [phone].

## 2025-04-08 NOTE — PROGRESS NOTES
"PHYSICAL MEDICINE & REHABILITATION / MEDICAL SPINE        Date:  Apr 8, 2025    Name:  Cristela Salgado  YOB: 1940  MRN:  [id number]      \"I am using a new tool to help me save time with documentation.  Basically it is going to listen to our visit today and uses AI to help me draft my note.  Is it okay if I use this tool today?\"        CHART REVIEW:  Reviewed 06/08/2022 note from Dipesh Méndez MD (neurosurgery).  Ms. Cristela Salgado had back and leg pain beginning in February 2022.  Pain began when she was walking up stairs, and Ms. Cristela Salgado's right leg went numb, and she was unable to walk anymore.  Ms. Cristela Salgado would occasionally get pain into her left leg.  Pain was worse with turning over in bed and getting up initially.  Pain was worse with transitioning from sitting to standing.  Ms. Cristela Salgado was started on gabapentin and tizanidine.  She was referred to physical therapy.  She was referred to medical spine.  If she were to fail conservative management, surgery would require a multilevel lumbar fusion procedure.          CHIEF COMPLAINT:  Right buttock and right anterolateral thigh pain.        HISTORY OF PRESENT ILLNESS:  Ms. Cristela Salgado is an 84-year-old, right-hand-dominant, female.  She has 3 children, 6 grandchildren, and 1 great grandchild.     Ms. Cristela Salgado denied any prior or recent injuries of her mid back, low back, pelvis, hips, thighs, knees, legs, ankles, feet, toes.     Ms. Cristela Salgado denied any personal or family history of autoimmune diseases, rheumatologic diseases, gout, pseudogout.  She denied any personal or family history of neurologic diseases.  Ms. Cristela Salgado denied any personal or family history of inflammatory bowel diseases.     On 07/18/2022, Ms. Cristela Salgado had a fluoroscopic guided right paramedian S1-S2 interlaminar epidural corticosteroid injection.  On 08/12/2022, Ms. Cristela Salgado reported she was no longer " "have any pain in her right lower extremity.     On 08/29/2022, Ms. Cristela Salgado had medial branch blocks of the bilateral L3-L4 and L4-L5 facet joints.  On 08/28/2024, Ms. Cristela Salgado reported that she did not notice any benefit from the medial branch blocks.     On 09/09/2024, Ms. Cristela Salgado had a left paramedian L5-S1 interlaminar epidural corticosteroid injection.  On 10/28/2024, Ms. Cristela Salgado reported 100% benefit from the left paramedian L5-S1 interlaminar epidural corticosteroid injection for 1 week.  She then felt that her pain gradually returned to its baseline in another 1 to 2 weeks.      Ms. Cristela Salgado developed right low back left buttock pain that radiated into the right posterolateral thigh stopping at the right posterolateral knee beginning on 03/06/2025.  This began without injury or incident.    Ms. Cristela Salgado was last seen in this clinic on 03/10/2025.  She returns to clinic today, 04/08/2025.    Ms. Cristela Salgado is no longer having low back pain.  She notes intermittent pain in her right buttock that extends into her right anterolateral thigh.  This pain is intermittent and is described \"tingling pain.\"  Pain only occurs with sleeping/lying on her left side.  Pain has significantly improved since she was last seen in clinic.    Ms. Cristela Wilsons symptoms do not keep her awake but do wake every 3-4 hours.    Ms. Cristela Salgado denies any weakness.  She denies any catching, locking, popping.  Ms. Cristela Salgado denies any bruising, redness, swelling.  She denies any give way episodes of her lower extremities.  Ms. Cristela Salgado denies any tripping, stumbling, falling.  She is not using any assistive devices for ambulation.  Ms. Cristela Salgado denies any other numbness, tingling, pins-and-needles.  She denies any saddle anesthesia, bowel incontinence, bladder incontinence.    Ms. Cristela Salgado denies that her bowel bilateral extremities become " weak and tired with standing.  She notes that her bilateral lower extremities become weak and tired after walking 1 block.  She does note improvement in these symptoms with leaning forward on a shopping cart.    Ms. Cristela Salgado has not tried acupuncture, chiropractor treatments, injections, massage, or physical therapy for this current episode of pain.  She did note significant benefit from the short course of prednisone 40 mg daily for 5 days.  Ms. Cristela Salgado states that she is taking up to 6 tablets of acetaminophen per day.    History of Present Illness-  - Cristela Salgado, 84-year-old female.  - Feeling good currently, with significant improvement in symptoms.  - Previously experienced pain radiating into the right leg.  - Pain was primarily on the right side, starting from the buttocks and extending down to the knee.  - Pain was described as tingling and occurred when lying on the left side or standing at a counter.  - No pain when lying on the back, but discomfort when lying on the left side.  - Pain would wake her up every three to four hours at night, but alleviated by adjusting a pillow between the legs.  - No current weakness, catching, locking, popping, bruising, redness, swelling, or giving way.  - Previously experienced weakness when standing over the counter, but resolved after prednisone treatment.  - No numbness, tingling, or pins and needles in the groin area.  - Legs get weak and tired when standing or walking for extended periods.  - Can walk about a block and sit for about an hour in a regular chair.  - Leans on a shopping cart to walk further distances.  - No recent acupuncture, chiropractic treatments, injections, massage, or physical therapy.  - Significant improvement after prednisone treatment, which was completed three weeks ago.  -  noted improvement in her condition.  - Concerned about the potential return of symptoms once the effects of prednisone wear off.  - Daughter  visits a chiropractor for neck problems due to her occupation as a [employer].        ALLERGIES:  Allergies   Allergen Reactions    Lisinopril Cough    Hydrochlorothiazide      Renal insufficiency    Iodine Hives     Iodine contrast    Metformin      Loose stools    Niacin      LEG CRAMPS    Norvasc [Amlodipine Besylate] Swelling    Simvastatin      ACHINESS           MEDICATIONS:  Current Outpatient Medications   Medication Sig Dispense Refill    albuterol (PROAIR HFA/PROVENTIL HFA/VENTOLIN HFA) 108 (90 Base) MCG/ACT inhaler Inhale 2 puffs into the lungs every 6 hours as needed for shortness of breath, wheezing or cough. 18 g 11    aspirin 81 MG tablet Take 1 tablet by mouth daily.      Cyanocobalamin (B-12) 1000 MCG SUBL Place 1 tablet under the tongue daily.      diltiazem ER COATED BEADS (CARDIZEM CD) 360 MG 24 hr capsule TAKE 1 CAPSULE BY MOUTH EVERY DAY 90 capsule 1    Elemental iron 65 mg Vitamin C 125 mg (VITRON C)  MG TABS tablet Take 1 tablet by mouth daily.      ezetimibe (ZETIA) 10 MG tablet Take 1 tablet (10 mg) by mouth daily 90 tablet 3    hydrALAZINE (APRESOLINE) 50 MG tablet TAKE 1 TABLET BY MOUTH 3 TIMES DAILY 270 tablet 1    labetalol (NORMODYNE) 100 MG tablet TAKE 1 TABLET BY MOUTH TWICE A  tablet 1    losartan (COZAAR) 100 MG tablet TAKE 1 TABLET BY MOUTH EVERY DAY 90 tablet 1    omeprazole (PRILOSEC) 40 MG DR capsule Take 1 capsule (40 mg) by mouth daily 90 capsule 3    rosuvastatin (CRESTOR) 40 MG tablet TAKE 1 TABLET BY MOUTH EVERY DAY 90 tablet 1    vitamin D3 (CHOLECALCIFEROL) 50 mcg (2000 units) tablet Take 1 tablet (50 mcg) by mouth daily           PAST MEDICAL HISTORY:  Past Medical History:   Diagnosis Date    CAD (coronary artery disease)      mild on CT angio    CKD (chronic kidney disease) stage 3, GFR 30-59 ml/min (H)     Colon polyps 2009    DDD (degenerative disc disease), lumbosacral 7/8/2022    Esophageal reflux 4/30/2008    Family history of ischemic heart disease      Fatigue 4/11/2013    Hiatal hernia 2009    History of blood transfusion 4/08    after hiatal hernia surgery    Hyperlipidemia LDL goal <70     Infection due to 2019 novel coronavirus 11/14/2022    Macular degeneration 1/3/2012    Mild persistent asthma     Nonrheumatic aortic valve stenosis 11/14/2022    Pulmonary hypertension (H) 8/20/2012    mild    Scoliosis     mild     Type 2 diabetes mellitus with diabetic chronic kidney disease  (goal A1C<8) 10/20/2015    Ulcer of the stomach and intestine 2009    Anthony's ulcer    Unspecified essential hypertension     Vitamin D deficiency 1/3/2012         PAST SURGICAL HISTORY:  Past Surgical History:   Procedure Laterality Date    ABDOMEN SURGERY  4/08    hiatal hernia    BREAST SURGERY  1974    biopsy-begign    CARDIAC SURGERY  1995    angiogram    COLONOSCOPY  2008    HERNIA REPAIR  2008    HYSTERECTOMY, IDRIS  1974    Fibroids    ZZC OPEN STOMACH,REPR ULCER,SUTURE  2009    Hiatal hernia, and Anthony's ulcer         FAMILY HISTORY:  Family History   Problem Relation Age of Onset    Heart Disease Mother         Rheumatic fever    Heart Disease Father         CAD, DM, PVD    C.A.D. Father         1st MI in 80s    Hypertension Sister         3 sisters, all with hypertension    Hypertension Sister     Dementia Sister     Family History Negative Son     Family History Negative Daughter     Family History Negative Daughter     C.A.D. Maternal Uncle         age 54 yrs, sudden cardiac    C.A.D. Maternal Uncle         age 55 yrs         SOCIAL HISTORY:  Social History     Socioeconomic History    Marital status:      Spouse name: Not on file    Number of children: 3    Years of education: Not on file    Highest education level: Not on file   Occupational History     Employer: RETIRED   Tobacco Use    Smoking status: Never    Smokeless tobacco: Never   Vaping Use    Vaping status: Never Used   Substance and Sexual Activity    Alcohol use: Yes     Comment: 1-2 drinks week     Drug use: No    Sexual activity: Yes     Partners: Male   Other Topics Concern    Parent/sibling w/ CABG, MI or angioplasty before 65F 55M? No     Service Not Asked    Blood Transfusions Not Asked    Caffeine Concern No     Comment: 1-2 cups daily    Occupational Exposure Not Asked    Hobby Hazards Not Asked    Sleep Concern No     Comment: nocturia    Stress Concern Not Asked    Weight Concern Not Asked    Special Diet Yes     Comment: low salt, limits sugar    Back Care Not Asked    Exercise No     Comment: walking, yardwork, limited due to plantar fascitis    Bike Helmet Not Asked    Seat Belt Not Asked    Self-Exams Not Asked   Social History Narrative    Not on file     Social Drivers of Health     Financial Resource Strain: Low Risk  (3/6/2024)    Financial Resource Strain     Within the past 12 months, have you or your family members you live with been unable to get utilities (heat, electricity) when it was really needed?: No   Food Insecurity: Low Risk  (3/6/2024)    Food Insecurity     Within the past 12 months, did you worry that your food would run out before you got money to buy more?: No     Within the past 12 months, did the food you bought just not last and you didn t have money to get more?: No   Transportation Needs: Low Risk  (3/6/2024)    Transportation Needs     Within the past 12 months, has lack of transportation kept you from medical appointments, getting your medicines, non-medical meetings or appointments, work, or from getting things that you need?: No   Physical Activity: Unknown (3/6/2024)    Exercise Vital Sign     Days of Exercise per Week: 3 days     Minutes of Exercise per Session: Not on file   Stress: No Stress Concern Present (3/6/2024)    [employer] [place] [employer] - Occupational Stress Questionnaire     Feeling of Stress : Not at all   Social Connections: Unknown (3/6/2024)    Social Connection and Isolation Panel [NHANES]     Frequency of Communication  "with Friends and Family: Not on file     Frequency of Social Gatherings with Friends and Family: Once a week     Attends Baptism Services: Not on file     Active Member of Clubs or Organizations: Not on file     Attends Club or Organization Meetings: Not on file     Marital Status: Not on file   Interpersonal Safety: Low Risk  (1/9/2025)    Interpersonal Safety     Do you feel physically and emotionally safe where you currently live?: Yes     Within the past 12 months, have you been hit, slapped, kicked or otherwise physically hurt by someone?: No     Within the past 12 months, have you been humiliated or emotionally abused in other ways by your partner or ex-partner?: No   Housing Stability: Low Risk  (3/6/2024)    Housing Stability     Do you have housing? : Yes     Are you worried about losing your housing?: No         PHYSICAL EXAMINATION:  Vitals:    04/08/25 1450   BP: 135/75   Pulse: 72   SpO2: 98%   Weight: 54.4 kg (120 lb)   Height: 1.575 m (5' 2\")       GENERAL:  No acute distress.  Pleasant and cooperative.   PSYCH:  Normal mood and affect.  HEAD:  Normocephalic.  SPEECH:  No dysarthria.  EYES:  No scleral icterus.  EARS:  Hearing is intact to spoken voice.  NOSE:  Midline, symmetric, no rhinorrhea.  LUNGS:  No respiratory distress.  No increased work of breathing.        IMAGING:  EXAM: MR LUMBAR SPINE W/O CONTRAST  LOCATION: [facility]  DATE: 3/20/2025     INDICATION:  Lumbosacral radiculopathy, Lumbosacral disc herniation, Scoliosis of thoracolumbar spine, unspecified scoliosis type, Facet arthropathy, lumbosacral, Degeneration of intervertebral disc of lumbosacral region with discogenic back pain and lower extremity pain  COMPARISON: Lumbar spine MRI: 2/15/2022. Total spine radiographs: 6/8/2022.  TECHNIQUE: Routine Lumbar Spine MRI without IV contrast.     FINDINGS:   In correlation with prior spine radiographs, there are 12 rib-bearing thoracic vertebral bodies and " transitional lumbosacral anatomy with lumbarized S1 segment. Vertebral body heights are maintained. No suspicious focal marrow replacing lesions. Fat signal lesion within the T12 vertebral body, most consistent with a benign hemangioma. Mild Modic type I degenerative marrow changes spanning L1-L2. Mild periarticular edema about the left facet joint at L5-S1, favored degenerative in etiology. Redemonstration of pronounced lumbar levoscoliosis centered at L3-L4. Grade 1 anterolisthesis of L5 on S1. Normal distal spinal cord and cauda equina with conus medullaris at L2. Asymmetric fatty atrophy of the left psoas musculature compared to the right. Unremarkable visualized bony pelvis.     T12-L1: Moderate disc height loss with desiccation. Circumferential disc osteophyte complex. Mild left facet hypertrophy. Minimal spinal canal stenosis. Mild left neural foraminal stenosis. No substantial right neural foraminal stenosis.      L1-L2: Moderate to advanced disc height loss with desiccation. Circumferential disc osteophyte complex, similar to prior. Mild facet hypertrophy, right greater than left. Similar mild spinal canal stenosis. Moderate left neural foraminal stenosis, slightly progressed. No substantial right neural foraminal stenosis.     L2-L3: Moderate to advanced right eccentric disc height loss with desiccation. Circumferential disc osteophyte complex. Mild to moderate facet arthropathy. No substantial spinal canal stenosis. Mild right neural foraminal stenosis, similar to prior. No substantial left neural foraminal stenosis.      L3-L4: Moderate disc height loss with desiccation. Circumferential disc osteophyte complex. Mild to moderate facet hypertrophy. Mild spinal canal stenosis, similar to prior. Mild right neural foraminal stenosis, similar to prior. No substantial left neural foraminal stenosis.     L4-L5: Advanced disc height loss with desiccation, progressed. Circumferential disc osteophyte complex with  interval decrease in size of disc bulge component. Moderate to advanced facet hypertrophy. Ligamentum flavum thickening. Moderate spinal canal stenosis, slightly improved in the interim. Moderate bilateral neural foraminal stenosis, slightly improved on the right, likely related to decreased disc bulging.     L5-S1: Mild disc height loss with desiccation. Anterolisthesis with disc uncovering/broad-based disc bulging. Advanced bilateral facet hypertrophy. Small bilateral facet joint effusions. Marked ligamentum flavum thickening. Moderate to advanced spinal canal stenosis with effacement of the left subarticular zone, slightly progressed. Moderate to advanced left neural foraminal stenosis with flattening of the exiting left L5 nerve root, progressed. No substantial right neural foraminal stenosis.     S1-S2: Minimal disc height loss with desiccation. Similar broad-based posterior disc bulging. Moderate facet arthropathy, left greater than right. Small right facet joint effusion. No substantial spinal canal stenosis. Moderate left neural foraminal stenosis, slightly progressed. No substantial right neural foraminal stenosis.    IMPRESSION:     1.  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 Advise close attention to numbering schema prior to any surgical or percutaneous intervention involving the lumbar spine.  2.  Extensive multilevel lumbar spondylosis in the setting of lumbar levoscoliosis, further detailed above. In general, degenerative changes have progressed since February 2022 with the exception of L4-L5 with interval improvement noted at this level due to decreased posterior disc bulging.  3.  Spinal canal stenosis is greatest and moderate to advanced with effacement of the left subarticular zone at L5-S1, progressed.  4.  Moderate spinal canal stenosis at L4-L5, slightly improved in the interim.  5.  Neural foraminal stenosis is greatest and moderate to advanced on the left at L5-S1 with  flattening of the exiting left L5 nerve root. Moderate neural foraminal stenosis bilaterally at L4-L5 and on the left at S1-S2.  6.  Mild Modic type I degenerative marrow changes at L1-L2. Mild periarticular edema about the left facet joint at L5-S1, favored degenerative in etiology.         XR SPINE COMPLETE SCOLIOSIS 2 VW 6/8/2022 2:00 PM     INDICATION: Spinal stenosis of lumbar region with neurogenic claudication  COMPARISON: None     IMPRESSION:   Transitional vertebral anatomy with completely lumbarized S1 vertebral body.  Careful attention to the numbering convention is recommended prior to any future percutaneous or surgical intervention. Nomenclature is based on twelve thoracic vertebral bodies and 12 paired ribs.  The first nonrib-bearing vertebral body will be labeled L1.     Sagittal balance is 2.9 cm positive.     Lumbar lordosis measures 8.8 degrees. . This was measured from the superior endplate of L1 to the inferior endplate of L5.     Thoracic kyphosis measures 50.9 degrees. . This was measured from the superior endplate of T1 to the inferior endplate of T12.     Dextroconvex scoliosis of the thoracic spine from the superior endplate of T8 to the inferior plate of T12 measuring 15.8 degrees. Levoconvex scoliosis of the lumbar spine from the superior plate of L1 to the inferior plate of L5 measuring 33.4 degrees.      Coronal balance is 1.1 cm to the left of the CSVL.     No focal consolidation or pleural effusion. Nonobstructive bowel gas pattern. Soft tissues unremarkable.           XR LUMBAR BENDING ONLY 2/3 VIEWS 6/8/2022 2:10 PM     INDICATION: Spinal stenosis of lumbar region with neurogenic claudication  COMPARISON: Scoliosis radiographs performed same day. MRI lumbar spine February 15, 2022.     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  This is different from the literature prior lumbar spine MRI February 15, 2022 Careful attention to the  numbering convention is recommended prior to any future percutaneous or surgical intervention.     On flexion there is anterolisthesis of L5 on S1 measuring 5 mm with normal alignment on extension. The vertebral bodies of the lumbar spine otherwise have normal stature and alignment. Anterior marginal osteophytes L1/L2, L2/L3 and L3/L4. Multilevel degenerative facet arthropathy, most pronounced at L3/L4-L5/S1. Anterior marginal osteophytes at L1/L2 and L2/L3. Atherosclerotic vascular disease. Soft tissues otherwise unremarkable.           MRI OF THE LUMBAR SPINE WITHOUT CONTRAST February 15, 2022 3:21 PM      HISTORY: Low back pain with worsening right lower extremity radicular  pain.     TECHNIQUE: Multiplanar, multisequence MRI images of the lumbar spine were acquired without IV contrast.     COMPARISON: None.     FINDINGS: There are five lumbar-type vertebrae for the purposes of this dictation.      Five lumbar type vertebral bodies. Normal vertebral body heights. 25 degree levoscoliosis apex at L2-L3. Mild Modic 1 marrow change in the L3-L4 apposing endplate on the right. The conus tip is identified at L1-L2. Visualized extraspinal soft tissues are within normal limits.     T12-L1: Moderate to severe disc height and T2 signal loss. Mild circumferential disc osteophyte complex. Mild bilateral facet arthropathy. No spinal canal or neural foraminal stenosis.      L1-L2: Moderate to severe disc height and T2 signal loss. Mild posterior annular bulge. Mild bilateral facet arthropathy. No spinal canal or neural foraminal stenosis.     L2-L3: Moderate to severe disc height and T2 signal loss. Mild circumferential disc osteophyte complex. Mild bilateral facet arthropathy and thickening of ligamenta flava. No spinal canal stenosis. No neural foraminal stenosis.     L3-L4: 9 mm leftward subluxation. Moderate disc height and T2 signal loss. Mild-to-moderate circumferential disc osteophyte complex. Mild bilateral facet  arthropathy. Moderate to severe spinal canal stenosis. Severe right and mild to moderate left neural foraminal stenosis.      L4-L5: 2 mm degenerative anterolisthesis. Mild disc height loss. Moderate disc T2 signal loss. Mild posterior annular bulge and left foraminal disc osteophyte complex. Moderate bilateral facet arthropathy and thickening of ligamenta flava. Mild/moderate spinal canal stenosis in a trefoil configuration. No right neural foraminal stenosis. Mild left neural foraminal stenosis.     L5-S1: Normal disc height. Moderate disc T2 signal loss. Mild posterior annular bulge. Small caudally directed left central disc herniation. Mild bilateral facet arthropathy. Mild left lateral recess stenosis. No central spinal canal stenosis. No right neural foraminal stenosis. Mild left neural foraminal stenosis.     IMPRESSION:  1.  25 degree levoscoliosis apex at L2-L3.  2.  9 mm L3-L4 leftward subluxation with moderate to severe spinal canal stenosis, severe right neural foraminal stenosis and mild to moderate left neural from stenosis.  3.  Mild/moderate L4-L5 spinal canal stenosis in a trefoil configuration.  4.  Mild left L5-S1 lateral recess stenosis.             ASSESSMENT/PLAN:  Ms. Cristela Salgado is an 84-year-old, right-hand-dominant, female.  S1 is lumbarized.  She has anterolisthesis of L5 on S1 with dynamic instability.  Ms. Cristela Salgado has thoracolumbar scoliosis, lumbosacral facet arthropathy, lumbosacral degenerative disc disease, lumbosacral spinal stenosis with neurogenic claudication.  She is not currently having symptoms of lumbosacral radiculopathy.  Discussed diagnoses, pathophysiologies, and treatment options with Ms. Cristela Salgado.  Discussed the options of doing nothing/living with it, physical therapy, chiropractic care, weight loss, core strengthening, oral medications such as gabapentin and pregabalin, lumbosacral epidural corticosteroid injection, referral to neurosurgery.  Ms.  Cristela Salgado is feeling well at this time.  She will follow-up in this clinic in 2 months.    Assessment & Plan  Lumbosacral radiculopathy:  - Suspected acute disc herniation resolved with prednisone, leading to significant improvement in symptoms. No current weakness or significant pain reported.  - Follow-up appointment scheduled in two months to monitor condition. Patient advised to call if symptoms worsen.    Lumbosacral stenosis with neurogenic claudication:  - Symptoms include legs getting weak and tired when standing or walking, relieved by leaning on a shopping cart. Condition improved with prednisone.  - Follow-up appointment scheduled in two months. Patient advised to call if symptoms worsen.    Degeneration of intervertebral disc of lumbosacral region with discogenic back pain and lower extremity pain:  - Degenerative changes in the spine noted. Improvement in symptoms with prednisone.  - Follow-up appointment scheduled in two months. Patient advised to call if symptoms worsen.        Total time on the date of the encounter:  32 minutes were spent on one more or more of the following:  discussion with patient, history, exam, coordinating care, treatment goals, record review, documenting clinical information, and/or data review.    Consent was obtained from the patient to use an AI documentation tool in the creation of this note.      Samir Mathis MD

## 2025-04-08 NOTE — LETTER
"4/8/2025      Cristela Salgado  [address]  [facility] [address]      Dear Colleague,    Thank you for referring your patient, Cristela Salgado, to the [facility]. Please see a copy of my visit note below.    PHYSICAL MEDICINE & REHABILITATION / MEDICAL SPINE        Date:  Apr 8, 2025    Name:  Cristela Salgado  YOB: 1940  MRN:  [id number]      \"I am using a new tool to help me save time with documentation.  Basically it is going to listen to our visit today and uses AI to help me draft my note.  Is it okay if I use this tool today?\"        CHART REVIEW:  Reviewed 06/08/2022 note from Dipesh Méndez MD (neurosurgery).  Ms. Cristela Salgado had back and leg pain beginning in February 2022.  Pain began when she was walking up stairs, and Ms. Cristela Salgado's right leg went numb, and she was unable to walk anymore.  Ms. Cristela Salgado would occasionally get pain into her left leg.  Pain was worse with turning over in bed and getting up initially.  Pain was worse with transitioning from sitting to standing.  Ms. Cristela Salgado was started on gabapentin and tizanidine.  She was referred to physical therapy.  She was referred to medical spine.  If she were to fail conservative management, surgery would require a multilevel lumbar fusion procedure.          CHIEF COMPLAINT:  Right buttock and right anterolateral thigh pain.        HISTORY OF PRESENT ILLNESS:  Ms. Cristela Salgado is an 84-year-old, right-hand-dominant, female.  She has 3 children, 6 grandchildren, and 1 great grandchild.     Ms. Cristela Salgado denied any prior or recent injuries of her mid back, low back, pelvis, hips, thighs, knees, legs, ankles, feet, toes.     Ms. Cristela Salgado denied any personal or family history of autoimmune diseases, rheumatologic diseases, gout, pseudogout.  She denied any personal or family history of neurologic diseases.  Ms. Cristela Salgado denied any " "personal or family history of inflammatory bowel diseases.     On 07/18/2022, Ms. Cristela Salgado had a fluoroscopic guided right paramedian S1-S2 interlaminar epidural corticosteroid injection.  On 08/12/2022, Ms. Cristela Salgado reported she was no longer have any pain in her right lower extremity.     On 08/29/2022, Ms. Cristela Salgado had medial branch blocks of the bilateral L3-L4 and L4-L5 facet joints.  On 08/28/2024, Ms. Cristela Salgado reported that she did not notice any benefit from the medial branch blocks.     On 09/09/2024, Ms. Cristela Salgado had a left paramedian L5-S1 interlaminar epidural corticosteroid injection.  On 10/28/2024, Ms. Cristela Salgado reported 100% benefit from the left paramedian L5-S1 interlaminar epidural corticosteroid injection for 1 week.  She then felt that her pain gradually returned to its baseline in another 1 to 2 weeks.      Ms. Cristela Salgado developed right low back left buttock pain that radiated into the right posterolateral thigh stopping at the right posterolateral knee beginning on 03/06/2025.  This began without injury or incident.    Ms. Cristela Salgado was last seen in this clinic on 03/10/2025.  She returns to clinic today, 04/08/2025.    Ms. Cristela Salgado is no longer having low back pain.  She notes intermittent pain in her right buttock that extends into her right anterolateral thigh.  This pain is intermittent and is described \"tingling pain.\"  Pain only occurs with sleeping/lying on her left side.  Pain has significantly improved since she was last seen in clinic.    Ms. Cristela Salgado's symptoms do not keep her awake but do wake every 3-4 hours.    Ms. Cristela Salgado denies any weakness.  She denies any catching, locking, popping.  Ms. Cristela Salgado denies any bruising, redness, swelling.  She denies any give way episodes of her lower extremities.  Ms. Cristela Salgado denies any tripping, stumbling, falling.  She is not using any " assistive devices for ambulation.  Ms. Cristela Salgado denies any other numbness, tingling, pins-and-needles.  She denies any saddle anesthesia, bowel incontinence, bladder incontinence.    Ms. Cristela Salgado denies that her bowel bilateral extremities become weak and tired with standing.  She notes that her bilateral lower extremities become weak and tired after walking 1 block.  She does note improvement in these symptoms with leaning forward on a shopping cart.    Ms. Cristela Salgado has not tried acupuncture, chiropractor treatments, injections, massage, or physical therapy for this current episode of pain.  She did note significant benefit from the short course of prednisone 40 mg daily for 5 days.  Ms. Cristela Salgado states that she is taking up to 6 tablets of acetaminophen per day.    History of Present Illness-  - Cristela Salgado, 84-year-old female.  - Feeling good currently, with significant improvement in symptoms.  - Previously experienced pain radiating into the right leg.  - Pain was primarily on the right side, starting from the buttocks and extending down to the knee.  - Pain was described as tingling and occurred when lying on the left side or standing at a counter.  - No pain when lying on the back, but discomfort when lying on the left side.  - Pain would wake her up every three to four hours at night, but alleviated by adjusting a pillow between the legs.  - No current weakness, catching, locking, popping, bruising, redness, swelling, or giving way.  - Previously experienced weakness when standing over the counter, but resolved after prednisone treatment.  - No numbness, tingling, or pins and needles in the groin area.  - Legs get weak and tired when standing or walking for extended periods.  - Can walk about a block and sit for about an hour in a regular chair.  - Leans on a shopping cart to walk further distances.  - No recent acupuncture, chiropractic treatments, injections, massage,  or physical therapy.  - Significant improvement after prednisone treatment, which was completed three weeks ago.  -  noted improvement in her condition.  - Concerned about the potential return of symptoms once the effects of prednisone wear off.  - Daughter visits a chiropractor for neck problems due to her occupation as a [employer].        ALLERGIES:  Allergies   Allergen Reactions     Lisinopril Cough     Hydrochlorothiazide      Renal insufficiency     Iodine Hives     Iodine contrast     Metformin      Loose stools     Niacin      LEG CRAMPS     Norvasc [Amlodipine Besylate] Swelling     Simvastatin      ACHINESS           MEDICATIONS:  Current Outpatient Medications   Medication Sig Dispense Refill     albuterol (PROAIR HFA/PROVENTIL HFA/VENTOLIN HFA) 108 (90 Base) MCG/ACT inhaler Inhale 2 puffs into the lungs every 6 hours as needed for shortness of breath, wheezing or cough. 18 g 11     aspirin 81 MG tablet Take 1 tablet by mouth daily.       Cyanocobalamin (B-12) 1000 MCG SUBL Place 1 tablet under the tongue daily.       diltiazem ER COATED BEADS (CARDIZEM CD) 360 MG 24 hr capsule TAKE 1 CAPSULE BY MOUTH EVERY DAY 90 capsule 1     Elemental iron 65 mg Vitamin C 125 mg (VITRON C)  MG TABS tablet Take 1 tablet by mouth daily.       ezetimibe (ZETIA) 10 MG tablet Take 1 tablet (10 mg) by mouth daily 90 tablet 3     hydrALAZINE (APRESOLINE) 50 MG tablet TAKE 1 TABLET BY MOUTH 3 TIMES DAILY 270 tablet 1     labetalol (NORMODYNE) 100 MG tablet TAKE 1 TABLET BY MOUTH TWICE A  tablet 1     losartan (COZAAR) 100 MG tablet TAKE 1 TABLET BY MOUTH EVERY DAY 90 tablet 1     omeprazole (PRILOSEC) 40 MG DR capsule Take 1 capsule (40 mg) by mouth daily 90 capsule 3     rosuvastatin (CRESTOR) 40 MG tablet TAKE 1 TABLET BY MOUTH EVERY DAY 90 tablet 1     vitamin D3 (CHOLECALCIFEROL) 50 mcg (2000 units) tablet Take 1 tablet (50 mcg) by mouth daily           PAST MEDICAL HISTORY:  Past Medical History:    Diagnosis Date     CAD (coronary artery disease)      mild on CT angio     CKD (chronic kidney disease) stage 3, GFR 30-59 ml/min (H)      Colon polyps 2009     DDD (degenerative disc disease), lumbosacral 7/8/2022     Esophageal reflux 4/30/2008     Family history of ischemic heart disease      Fatigue 4/11/2013     Hiatal hernia 2009     History of blood transfusion 4/08    after hiatal hernia surgery     Hyperlipidemia LDL goal <70      Infection due to 2019 novel coronavirus 11/14/2022     Macular degeneration 1/3/2012     Mild persistent asthma      Nonrheumatic aortic valve stenosis 11/14/2022     Pulmonary hypertension (H) 8/20/2012    mild     Scoliosis     mild      Type 2 diabetes mellitus with diabetic chronic kidney disease  (goal A1C<8) 10/20/2015     Ulcer of the stomach and intestine 2009    Anthony's ulcer     Unspecified essential hypertension      Vitamin D deficiency 1/3/2012         PAST SURGICAL HISTORY:  Past Surgical History:   Procedure Laterality Date     ABDOMEN SURGERY  4/08    hiatal hernia     BREAST SURGERY  1974    biopsy-begign     CARDIAC SURGERY  1995    angiogram     COLONOSCOPY  2008     HERNIA REPAIR  2008     HYSTERECTOMY, IDRIS  1974    Fibroids     ZZC OPEN STOMACH,REPR ULCER,SUTURE  2009    Hiatal hernia, and Anthony's ulcer         FAMILY HISTORY:  Family History   Problem Relation Age of Onset     Heart Disease Mother         Rheumatic fever     Heart Disease Father         CAD, DM, PVD     C.A.D. Father         1st MI in 80s     Hypertension Sister         3 sisters, all with hypertension     Hypertension Sister      Dementia Sister      Family History Negative Son      Family History Negative Daughter      Family History Negative Daughter      C.A.D. Maternal Uncle         age 54 yrs, sudden cardiac     C.A.D. Maternal Uncle         age 55 yrs         SOCIAL HISTORY:  Social History     Socioeconomic History     Marital status:      Spouse name: Not on file      Number of children: 3     Years of education: Not on file     Highest education level: Not on file   Occupational History     Employer: RETIRED   Tobacco Use     Smoking status: Never     Smokeless tobacco: Never   Vaping Use     Vaping status: Never Used   Substance and Sexual Activity     Alcohol use: Yes     Comment: 1-2 drinks week     Drug use: No     Sexual activity: Yes     Partners: Male   Other Topics Concern     Parent/sibling w/ CABG, MI or angioplasty before 65F 55M? No      Service Not Asked     Blood Transfusions Not Asked     Caffeine Concern No     Comment: 1-2 cups daily     Occupational Exposure Not Asked     Hobby Hazards Not Asked     Sleep Concern No     Comment: nocturia     Stress Concern Not Asked     Weight Concern Not Asked     Special Diet Yes     Comment: low salt, limits sugar     Back Care Not Asked     Exercise No     Comment: walking, yardwork, limited due to plantar fascitis     Bike Helmet Not Asked     Seat Belt Not Asked     Self-Exams Not Asked   Social History Narrative     Not on file     Social Drivers of Health     Financial Resource Strain: Low Risk  (3/6/2024)    Financial Resource Strain      Within the past 12 months, have you or your family members you live with been unable to get utilities (heat, electricity) when it was really needed?: No   Food Insecurity: Low Risk  (3/6/2024)    Food Insecurity      Within the past 12 months, did you worry that your food would run out before you got money to buy more?: No      Within the past 12 months, did the food you bought just not last and you didn t have money to get more?: No   Transportation Needs: Low Risk  (3/6/2024)    Transportation Needs      Within the past 12 months, has lack of transportation kept you from medical appointments, getting your medicines, non-medical meetings or appointments, work, or from getting things that you need?: No   Physical Activity: Unknown (3/6/2024)    Exercise Vital Sign      Days of  "Exercise per Week: 3 days      Minutes of Exercise per Session: Not on file   Stress: No Stress Concern Present (3/6/2024)    [employer] - Occupational Stress Questionnaire      Feeling of Stress : Not at all   Social Connections: Unknown (3/6/2024)    Social Connection and Isolation Panel [NHANES]      Frequency of Communication with Friends and Family: Not on file      Frequency of Social Gatherings with Friends and Family: Once a week      Attends Hoahaoism Services: Not on file      Active Member of Clubs or Organizations: Not on file      Attends Club or Organization Meetings: Not on file      Marital Status: Not on file   Interpersonal Safety: Low Risk  (1/9/2025)    Interpersonal Safety      Do you feel physically and emotionally safe where you currently live?: Yes      Within the past 12 months, have you been hit, slapped, kicked or otherwise physically hurt by someone?: No      Within the past 12 months, have you been humiliated or emotionally abused in other ways by your partner or ex-partner?: No   Housing Stability: Low Risk  (3/6/2024)    Housing Stability      Do you have housing? : Yes      Are you worried about losing your housing?: No         PHYSICAL EXAMINATION:  Vitals:    04/08/25 1450   BP: 135/75   Pulse: 72   SpO2: 98%   Weight: 54.4 kg (120 lb)   Height: 1.575 m (5' 2\")       GENERAL:  No acute distress.  Pleasant and cooperative.   PSYCH:  Normal mood and affect.  HEAD:  Normocephalic.  SPEECH:  No dysarthria.  EYES:  No scleral icterus.  EARS:  Hearing is intact to spoken voice.  NOSE:  Midline, symmetric, no rhinorrhea.  LUNGS:  No respiratory distress.  No increased work of breathing.        IMAGING:  EXAM: MR LUMBAR SPINE W/O CONTRAST  LOCATION: [facility]  DATE: 3/20/2025     INDICATION:  Lumbosacral radiculopathy, Lumbosacral disc herniation, Scoliosis of thoracolumbar spine, unspecified scoliosis type, Facet arthropathy, " lumbosacral, Degeneration of intervertebral disc of lumbosacral region with discogenic back pain and lower extremity pain  COMPARISON: Lumbar spine MRI: 2/15/2022. Total spine radiographs: 6/8/2022.  TECHNIQUE: Routine Lumbar Spine MRI without IV contrast.     FINDINGS:   In correlation with prior spine radiographs, there are 12 rib-bearing thoracic vertebral bodies and transitional lumbosacral anatomy with lumbarized S1 segment. Vertebral body heights are maintained. No suspicious focal marrow replacing lesions. Fat signal lesion within the T12 vertebral body, most consistent with a benign hemangioma. Mild Modic type I degenerative marrow changes spanning L1-L2. Mild periarticular edema about the left facet joint at L5-S1, favored degenerative in etiology. Redemonstration of pronounced lumbar levoscoliosis centered at L3-L4. Grade 1 anterolisthesis of L5 on S1. Normal distal spinal cord and cauda equina with conus medullaris at L2. Asymmetric fatty atrophy of the left psoas musculature compared to the right. Unremarkable visualized bony pelvis.     T12-L1: Moderate disc height loss with desiccation. Circumferential disc osteophyte complex. Mild left facet hypertrophy. Minimal spinal canal stenosis. Mild left neural foraminal stenosis. No substantial right neural foraminal stenosis.      L1-L2: Moderate to advanced disc height loss with desiccation. Circumferential disc osteophyte complex, similar to prior. Mild facet hypertrophy, right greater than left. Similar mild spinal canal stenosis. Moderate left neural foraminal stenosis, slightly progressed. No substantial right neural foraminal stenosis.     L2-L3: Moderate to advanced right eccentric disc height loss with desiccation. Circumferential disc osteophyte complex. Mild to moderate facet arthropathy. No substantial spinal canal stenosis. Mild right neural foraminal stenosis, similar to prior. No substantial left neural foraminal stenosis.      L3-L4: Moderate  disc height loss with desiccation. Circumferential disc osteophyte complex. Mild to moderate facet hypertrophy. Mild spinal canal stenosis, similar to prior. Mild right neural foraminal stenosis, similar to prior. No substantial left neural foraminal stenosis.     L4-L5: Advanced disc height loss with desiccation, progressed. Circumferential disc osteophyte complex with interval decrease in size of disc bulge component. Moderate to advanced facet hypertrophy. Ligamentum flavum thickening. Moderate spinal canal stenosis, slightly improved in the interim. Moderate bilateral neural foraminal stenosis, slightly improved on the right, likely related to decreased disc bulging.     L5-S1: Mild disc height loss with desiccation. Anterolisthesis with disc uncovering/broad-based disc bulging. Advanced bilateral facet hypertrophy. Small bilateral facet joint effusions. Marked ligamentum flavum thickening. Moderate to advanced spinal canal stenosis with effacement of the left subarticular zone, slightly progressed. Moderate to advanced left neural foraminal stenosis with flattening of the exiting left L5 nerve root, progressed. No substantial right neural foraminal stenosis.     S1-S2: Minimal disc height loss with desiccation. Similar broad-based posterior disc bulging. Moderate facet arthropathy, left greater than right. Small right facet joint effusion. No substantial spinal canal stenosis. Moderate left neural foraminal stenosis, slightly progressed. No substantial right neural foraminal stenosis.    IMPRESSION:     1.  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 Advise close attention to numbering schema prior to any surgical or percutaneous intervention involving the lumbar spine.  2.  Extensive multilevel lumbar spondylosis in the setting of lumbar levoscoliosis, further detailed above. In general, degenerative changes have progressed since February 2022 with the exception of L4-L5 with interval  improvement noted at this level due to decreased posterior disc bulging.  3.  Spinal canal stenosis is greatest and moderate to advanced with effacement of the left subarticular zone at L5-S1, progressed.  4.  Moderate spinal canal stenosis at L4-L5, slightly improved in the interim.  5.  Neural foraminal stenosis is greatest and moderate to advanced on the left at L5-S1 with flattening of the exiting left L5 nerve root. Moderate neural foraminal stenosis bilaterally at L4-L5 and on the left at S1-S2.  6.  Mild Modic type I degenerative marrow changes at L1-L2. Mild periarticular edema about the left facet joint at L5-S1, favored degenerative in etiology.         XR SPINE COMPLETE SCOLIOSIS 2 VW 6/8/2022 2:00 PM     INDICATION: Spinal stenosis of lumbar region with neurogenic claudication  COMPARISON: None     IMPRESSION:   Transitional vertebral anatomy with completely lumbarized S1 vertebral body.  Careful attention to the numbering convention is recommended prior to any future percutaneous or surgical intervention. Nomenclature is based on twelve thoracic vertebral bodies and 12 paired ribs.  The first nonrib-bearing vertebral body will be labeled L1.     Sagittal balance is 2.9 cm positive.     Lumbar lordosis measures 8.8 degrees. . This was measured from the superior endplate of L1 to the inferior endplate of L5.     Thoracic kyphosis measures 50.9 degrees. . This was measured from the superior endplate of T1 to the inferior endplate of T12.     Dextroconvex scoliosis of the thoracic spine from the superior endplate of T8 to the inferior plate of T12 measuring 15.8 degrees. Levoconvex scoliosis of the lumbar spine from the superior plate of L1 to the inferior plate of L5 measuring 33.4 degrees.      Coronal balance is 1.1 cm to the left of the CSVL.     No focal consolidation or pleural effusion. Nonobstructive bowel gas pattern. Soft tissues unremarkable.           XR LUMBAR BENDING ONLY 2/3 VIEWS 6/8/2022  2:10 PM     INDICATION: Spinal stenosis of lumbar region with neurogenic claudication  COMPARISON: Scoliosis radiographs performed same day. MRI lumbar spine February 15, 2022.     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  This is different from the literature prior lumbar spine MRI February 15, 2022 Careful attention to the numbering convention is recommended prior to any future percutaneous or surgical intervention.     On flexion there is anterolisthesis of L5 on S1 measuring 5 mm with normal alignment on extension. The vertebral bodies of the lumbar spine otherwise have normal stature and alignment. Anterior marginal osteophytes L1/L2, L2/L3 and L3/L4. Multilevel degenerative facet arthropathy, most pronounced at L3/L4-L5/S1. Anterior marginal osteophytes at L1/L2 and L2/L3. Atherosclerotic vascular disease. Soft tissues otherwise unremarkable.           MRI OF THE LUMBAR SPINE WITHOUT CONTRAST February 15, 2022 3:21 PM      HISTORY: Low back pain with worsening right lower extremity radicular  pain.     TECHNIQUE: Multiplanar, multisequence MRI images of the lumbar spine were acquired without IV contrast.     COMPARISON: None.     FINDINGS: There are five lumbar-type vertebrae for the purposes of this dictation.      Five lumbar type vertebral bodies. Normal vertebral body heights. 25 degree levoscoliosis apex at L2-L3. Mild Modic 1 marrow change in the L3-L4 apposing endplate on the right. The conus tip is identified at L1-L2. Visualized extraspinal soft tissues are within normal limits.     T12-L1: Moderate to severe disc height and T2 signal loss. Mild circumferential disc osteophyte complex. Mild bilateral facet arthropathy. No spinal canal or neural foraminal stenosis.      L1-L2: Moderate to severe disc height and T2 signal loss. Mild posterior annular bulge. Mild bilateral facet arthropathy. No spinal canal or neural foraminal stenosis.     L2-L3:  Moderate to severe disc height and T2 signal loss. Mild circumferential disc osteophyte complex. Mild bilateral facet arthropathy and thickening of ligamenta flava. No spinal canal stenosis. No neural foraminal stenosis.     L3-L4: 9 mm leftward subluxation. Moderate disc height and T2 signal loss. Mild-to-moderate circumferential disc osteophyte complex. Mild bilateral facet arthropathy. Moderate to severe spinal canal stenosis. Severe right and mild to moderate left neural foraminal stenosis.      L4-L5: 2 mm degenerative anterolisthesis. Mild disc height loss. Moderate disc T2 signal loss. Mild posterior annular bulge and left foraminal disc osteophyte complex. Moderate bilateral facet arthropathy and thickening of ligamenta flava. Mild/moderate spinal canal stenosis in a trefoil configuration. No right neural foraminal stenosis. Mild left neural foraminal stenosis.     L5-S1: Normal disc height. Moderate disc T2 signal loss. Mild posterior annular bulge. Small caudally directed left central disc herniation. Mild bilateral facet arthropathy. Mild left lateral recess stenosis. No central spinal canal stenosis. No right neural foraminal stenosis. Mild left neural foraminal stenosis.     IMPRESSION:  1.  25 degree levoscoliosis apex at L2-L3.  2.  9 mm L3-L4 leftward subluxation with moderate to severe spinal canal stenosis, severe right neural foraminal stenosis and mild to moderate left neural from stenosis.  3.  Mild/moderate L4-L5 spinal canal stenosis in a trefoil configuration.  4.  Mild left L5-S1 lateral recess stenosis.             ASSESSMENT/PLAN:  Ms. Cristela Salgado is an 84-year-old, right-hand-dominant, female.  S1 is lumbarized.  She has anterolisthesis of L5 on S1 with dynamic instability.  Ms. Cristela Salgado has thoracolumbar scoliosis, lumbosacral facet arthropathy, lumbosacral degenerative disc disease, lumbosacral spinal stenosis with neurogenic claudication.  She is not currently having  symptoms of lumbosacral radiculopathy.  Discussed diagnoses, pathophysiologies, and treatment options with Ms. Cristela Salgado.  Discussed the options of doing nothing/living with it, physical therapy, chiropractic care, weight loss, core strengthening, oral medications such as gabapentin and pregabalin, lumbosacral epidural corticosteroid injection, referral to neurosurgery.  Ms. Cristela Salgado is feeling well at this time.  She will follow-up in this clinic in 2 months.    Assessment & Plan  Lumbosacral radiculopathy:  - Suspected acute disc herniation resolved with prednisone, leading to significant improvement in symptoms. No current weakness or significant pain reported.  - Follow-up appointment scheduled in two months to monitor condition. Patient advised to call if symptoms worsen.    Lumbosacral stenosis with neurogenic claudication:  - Symptoms include legs getting weak and tired when standing or walking, relieved by leaning on a shopping cart. Condition improved with prednisone.  - Follow-up appointment scheduled in two months. Patient advised to call if symptoms worsen.    Degeneration of intervertebral disc of lumbosacral region with discogenic back pain and lower extremity pain:  - Degenerative changes in the spine noted. Improvement in symptoms with prednisone.  - Follow-up appointment scheduled in two months. Patient advised to call if symptoms worsen.        Total time on the date of the encounter:  32 minutes were spent on one more or more of the following:  discussion with patient, history, exam, coordinating care, treatment goals, record review, documenting clinical information, and/or data review.    Consent was obtained from the patient to use an AI documentation tool in the creation of this note.      Samir Mathis MD        Again, thank you for allowing me to participate in the care of your patient.        Sincerely,        Samir Mathis MD    Electronically signed

## 2025-05-13 ENCOUNTER — TELEPHONE (OUTPATIENT)
Facility: CLINIC | Age: 85
End: 2025-05-13
Payer: COMMERCIAL

## 2025-05-13 NOTE — TELEPHONE ENCOUNTER
Called to reschedule appt 7/11 w/ Charu FROST and callback information    [phone]    Danae Morris  Structural Heart Procedure   [facility]/ [facility]

## 2025-05-30 ENCOUNTER — TRANSFERRED RECORDS (OUTPATIENT)
Dept: HEALTH INFORMATION MANAGEMENT | Facility: CLINIC | Age: 85
End: 2025-05-30
Payer: COMMERCIAL

## 2025-06-23 ENCOUNTER — TELEPHONE (OUTPATIENT)
Dept: INTERNAL MEDICINE | Facility: CLINIC | Age: 85
End: 2025-06-23
Payer: COMMERCIAL

## 2025-06-23 NOTE — TELEPHONE ENCOUNTER
"labetalol (NORMODYNE) 100 MG tablet   diltiazem ER COATED BEADS (CARDIZEM CD) 360 MG 24 hr capsule     [facility] pharmacist called from [facility] in [facility] stating labetalol and ditiazen is getting an interaction message: \"patient is at an increased risk of cardiovascular collapse.\"    They need provider to review and approve to fill.    "

## 2025-06-25 ENCOUNTER — PATIENT OUTREACH (OUTPATIENT)
Dept: CARE COORDINATION | Facility: CLINIC | Age: 85
End: 2025-06-25
Payer: COMMERCIAL

## 2025-06-25 NOTE — TELEPHONE ENCOUNTER
Tell pharmacy to fill Rx as prescribed. MD aware that both meds can affect heart rate. Heart rates have been normal and pt also followed by cardiology who is aware of pt's current medications   WDL

## 2025-06-25 NOTE — TELEPHONE ENCOUNTER
Called and left detailed VM for [facility] on provider line with note below.   Asked them to call back if further clarification or information is needed.     Thank you,  Sherita Chen RN

## 2025-07-10 ENCOUNTER — HOSPITAL ENCOUNTER (OUTPATIENT)
Dept: CARDIOLOGY | Facility: CLINIC | Age: 85
End: 2025-07-10
Attending: INTERNAL MEDICINE
Payer: COMMERCIAL

## 2025-07-10 DIAGNOSIS — I35.0 NONRHEUMATIC AORTIC VALVE STENOSIS: ICD-10-CM

## 2025-07-10 LAB — LVEF ECHO: NORMAL

## 2025-07-10 PROCEDURE — 93306 TTE W/DOPPLER COMPLETE: CPT

## 2025-07-14 ENCOUNTER — OFFICE VISIT (OUTPATIENT)
Dept: CARDIOLOGY | Facility: CLINIC | Age: 85
End: 2025-07-14
Attending: INTERNAL MEDICINE
Payer: COMMERCIAL

## 2025-07-14 VITALS
BODY MASS INDEX: 22.45 KG/M2 | OXYGEN SATURATION: 98 % | WEIGHT: 122 LBS | SYSTOLIC BLOOD PRESSURE: 118 MMHG | HEIGHT: 62 IN | HEART RATE: 60 BPM | DIASTOLIC BLOOD PRESSURE: 66 MMHG

## 2025-07-14 DIAGNOSIS — I35.0 NONRHEUMATIC AORTIC VALVE STENOSIS: ICD-10-CM

## 2025-07-14 PROCEDURE — 3074F SYST BP LT 130 MM HG: CPT

## 2025-07-14 PROCEDURE — 3078F DIAST BP <80 MM HG: CPT

## 2025-07-14 PROCEDURE — 99215 OFFICE O/P EST HI 40 MIN: CPT

## 2025-07-14 NOTE — PROGRESS NOTES
~Cardiology Clinic Visit~    Referring Cardiologist: Dr. Cheek  Reason for visit: cardiology follow up      Cristela Salgado MRN# [id number]   YOB: 1940 Age: 84 year old       HPI:    Cristela Salgado is a delightful 84 year old patient with past medical history significant for:    Moderate aortic stenosis   Asthma   Hyperlipidemia  Chronic kidney disease stage IIIa  Anemia    I had the opportunity to see Cristela Salgado for cardiology follow up.  In review, the patient was evaluated in our valve clinic for aortic stenosis by Dr. Cheek in 11/2024.  She was most recently seen by my colleague Charu Grewal CNP on 12/27/2024 and reported worsening dyspnea on exertion with minimal activity.  An echocardiogram from 11/2024 reported progression of her aortic stenosis to the severe range with dimensionless index of 0.31.  A transesophageal for better assessment was recommended.    Transesophageal echocardiogram was performed on 1/9/2025 and findings more consistent with moderate aortic stenosis with mean valve gradient 21 mL,  Vmax 2.1 m/s, DI 0.35 60%.     Following this the patient reported improvement in dyspnea and fatigue with iron supplementation and increased hemoglobin level.    A repeat echocardiogram was completed on 7/10/2025 and findings consistent with moderate aortic stenosis.  Mean valve gradient 33 mmHg, CHANG 1.2 cm , DI 0.36, and LVEF 65-70%.    Ms. Salgado returns for 6 month cardiology follow up and is accompanied by her Zander demarco. Overall, she reports feeling well from a cardiovascular standpoint. She does note a right upper chest/shoulder pain after waking last week. This lasted about an hour and resolved with aspirin. No recurrence. Otherwise no chest pain, pressure, or tightness. No shortness of breath or dyspnea on exertion. No lower extremity swelling, orthopnea, or PND. No lightheadedness, dizziness, or syncope. No palpitations or racing heart. No blood in urine or  stool. Going to [facility] for spinal stenosis, may have upcoming spinal procedure.     Diagnotic studies:  Echocardiogram 7/10/2025:  Left ventricular systolic function is normal.  The visual ejection fraction is 65-70%.  The right ventricle is normal in structure, function and size.  Moderate valvular aortic stenosis.  mean 33mmHg, calc CHANG 1.2cm2  In comparison to previous study, AV gradients have increased      Assessment:  Moderate aortic stenosis, asymptomatic   Echocardiogram 7/10/2025: mean aortic valve gradient 33 mmHg, calc CHANG 1.2 cm2  Asthma managed with inhalers, stable  Hyperlipidemia on rosuvastatin and ezetimibe  Chronic kidney disease stage IIIa  Anemia on iron supplementation. Most recent Hgb 11.5 (1/2025)      Plan:   It was my pleasure to see Ms. Salgado for cardiology follow up today. Overall, she reports feeling well from a cardiac standpoint and has no exertional symptoms. Additionally, she has had no recurrence of her prior dyspnea or fatigue that was likely related to anemia. We reviewed the results of her recent echocardiogram which does show increased aortic valve gradients however remains in the moderate range. Plan to repeat echocardiogram in 6 months.   Single episode of upper right chest/shoulder discomfort that was likely musculoskeletal. No exertional symptoms. For now, monitor for any recurrence.   We reviewed symptoms of aortic stenosis and to keep us updated if any new symptoms.  Follow up with me in 6 months after echocardiogram, sooner if any new concerns.     Thank you for the opportunity to participate in this pleasant patient's care.    We would be happy to see this patient sooner for any concerns in the meantime.    JESSE Dunn, CNP   Nurse Practitioner  [facility]      Today's clinic visit entailed:  A total of 40 minutes was spent with patient, on chart review and documentation today.         Orders Placed This Encounter   Procedures    Follow-Up  with Cardiology MADY    Echocardiogram Complete     No orders of the defined types were placed in this encounter.    There are no discontinued medications.      Encounter Diagnosis   Name Primary?    Nonrheumatic aortic valve stenosis        CURRENT MEDICATIONS:  Current Outpatient Medications   Medication Sig Dispense Refill    albuterol (PROAIR HFA/PROVENTIL HFA/VENTOLIN HFA) 108 (90 Base) MCG/ACT inhaler Inhale 2 puffs into the lungs every 6 hours as needed for shortness of breath, wheezing or cough. 18 g 11    aspirin 81 MG tablet Take 1 tablet by mouth daily.      Cyanocobalamin (B-12) 1000 MCG SUBL Place 1 tablet under the tongue daily.      diltiazem ER COATED BEADS (CARDIZEM CD) 360 MG 24 hr capsule TAKE 1 CAPSULE BY MOUTH EVERY DAY 90 capsule 1    Elemental iron 65 mg Vitamin C 125 mg (VITRON C)  MG TABS tablet Take 1 tablet by mouth daily.      ezetimibe (ZETIA) 10 MG tablet Take 1 tablet (10 mg) by mouth daily 90 tablet 3    hydrALAZINE (APRESOLINE) 50 MG tablet TAKE 1 TABLET BY MOUTH 3 TIMES DAILY 270 tablet 1    labetalol (NORMODYNE) 100 MG tablet TAKE 1 TABLET BY MOUTH TWICE A  tablet 1    losartan (COZAAR) 100 MG tablet TAKE 1 TABLET BY MOUTH EVERY DAY 90 tablet 1    omeprazole (PRILOSEC) 40 MG DR capsule Take 1 capsule (40 mg) by mouth daily 90 capsule 3    rosuvastatin (CRESTOR) 40 MG tablet TAKE 1 TABLET BY MOUTH EVERY DAY 90 tablet 1    vitamin D3 (CHOLECALCIFEROL) 50 mcg (2000 units) tablet Take 1 tablet (50 mcg) by mouth daily         ALLERGIES     Allergies   Allergen Reactions    Lisinopril Cough    Hydrochlorothiazide      Renal insufficiency    Iodine Hives     Iodine contrast    Metformin      Loose stools    Niacin      LEG CRAMPS    Norvasc [Amlodipine Besylate] Swelling    Simvastatin      ACHINESS         PAST MEDICAL HISTORY:  Past Medical History:   Diagnosis Date    CAD (coronary artery disease)      mild on CT angio    CKD (chronic kidney disease) stage 3, GFR 30-59  ml/min (H)     Colon polyps 2009    DDD (degenerative disc disease), lumbosacral 7/8/2022    Esophageal reflux 4/30/2008    Family history of ischemic heart disease     Fatigue 4/11/2013    Hiatal hernia 2009    History of blood transfusion 4/08    after hiatal hernia surgery    Hyperlipidemia LDL goal <70     Infection due to 2019 novel coronavirus 11/14/2022    Macular degeneration 1/3/2012    Mild persistent asthma     Nonrheumatic aortic valve stenosis 11/14/2022    Pulmonary hypertension (H) 8/20/2012    mild    Scoliosis     mild     Type 2 diabetes mellitus with diabetic chronic kidney disease  (goal A1C<8) 10/20/2015    Ulcer of the stomach and intestine 2009    Anthony's ulcer    Unspecified essential hypertension     Vitamin D deficiency 1/3/2012       PAST SURGICAL HISTORY:  Past Surgical History:   Procedure Laterality Date    ABDOMEN SURGERY  4/08    hiatal hernia    BREAST SURGERY  1974    biopsy-begign    CARDIAC SURGERY  1995    angiogram    COLONOSCOPY  2008    HERNIA REPAIR  2008    HYSTERECTOMY, IDRIS  1974    Fibroids    ZZC OPEN STOMACH,REPR ULCER,SUTURE  2009    Hiatal hernia, and Anthony's ulcer       FAMILY HISTORY:  Family History   Problem Relation Age of Onset    Heart Disease Mother         Rheumatic fever    Heart Disease Father         CAD, DM, PVD    C.A.D. Father         1st MI in 80s    Hypertension Sister         3 sisters, all with hypertension    Hypertension Sister     Dementia Sister     Family History Negative Son     Family History Negative Daughter     Family History Negative Daughter     C.A.D. Maternal Uncle         age 54 yrs, sudden cardiac    C.A.D. Maternal Uncle         age 55 yrs       SOCIAL HISTORY:  Social History     Socioeconomic History    Marital status:     Number of children: 3   Occupational History     Employer: RETIRED   Tobacco Use    Smoking status: Never    Smokeless tobacco: Never   Vaping Use    Vaping status: Never Used   Substance and Sexual  Activity    Alcohol use: Yes     Comment: 1-2 drinks week    Drug use: No    Sexual activity: Yes     Partners: Male   Other Topics Concern    Parent/sibling w/ CABG, MI or angioplasty before 65F 55M? No    Caffeine Concern No     Comment: 1-2 cups daily    Sleep Concern No     Comment: nocturia    Special Diet Yes     Comment: low salt, limits sugar    Exercise No     Comment: walking, yardwork, limited due to plantar fascitis     Social Drivers of Health     Financial Resource Strain: Low Risk  (3/6/2024)    Financial Resource Strain     Within the past 12 months, have you or your family members you live with been unable to get utilities (heat, electricity) when it was really needed?: No   Food Insecurity: Low Risk  (3/6/2024)    Food Insecurity     Within the past 12 months, did you worry that your food would run out before you got money to buy more?: No     Within the past 12 months, did the food you bought just not last and you didn t have money to get more?: No   Transportation Needs: Low Risk  (3/6/2024)    Transportation Needs     Within the past 12 months, has lack of transportation kept you from medical appointments, getting your medicines, non-medical meetings or appointments, work, or from getting things that you need?: No   Physical Activity: Unknown (3/6/2024)    Exercise Vital Sign     Days of Exercise per Week: 3 days   Stress: No Stress Concern Present (3/6/2024)    [place] [employer] - Occupational Stress Questionnaire     Feeling of Stress : Not at all   Social Connections: Unknown (3/6/2024)    Social Connection and Isolation Panel [NHANES]     Frequency of Social Gatherings with Friends and Family: Once a week   Interpersonal Safety: Low Risk  (1/9/2025)    Interpersonal Safety     Do you feel physically and emotionally safe where you currently live?: Yes     Within the past 12 months, have you been hit, slapped, kicked or otherwise physically hurt by someone?: No      "Within the past 12 months, have you been humiliated or emotionally abused in other ways by your partner or ex-partner?: No   Housing Stability: Low Risk  (3/6/2024)    Housing Stability     Do you have housing? : Yes     Are you worried about losing your housing?: No       Review of Systems:  Skin:        Eyes:       ENT:       Respiratory:       Cardiovascular:       Gastroenterology:      Genitourinary:       Musculoskeletal:       Neurologic:       Psychiatric:       Heme/Lymph/Imm:       Endocrine:         Physical Exam:    Vitals: /66   Pulse 60   Ht 1.575 m (5' 2\")   Wt 55.3 kg (122 lb)   SpO2 98%   BMI 22.31 kg/m    Constitutional: Well nourished and in no apparent distress.  Neck: Supple.  Respiratory: Breathing non-labored. Lungs clear to auscultation bilaterally. No crackles, wheezes, rhonchi, or rales.  Cardiovascular:  Regular rate and rhythm, normal S1 and S2. 2+ systolic murmur. No rub or gallop.  Skin: Warm, dry.   Extremities: No edema.  Neurologic: No gross motor deficits. Alert, awake, and oriented to person, place and time.  Psychiatric: Affect appropriate.        Recent Lab Results:  LIPID RESULTS:  Lab Results   Component Value Date    CHOL 186 03/06/2024    CHOL 182 07/23/2020    HDL 74 03/06/2024    HDL 63 07/23/2020    LDL 90 03/06/2024    LDL 91 07/23/2020    TRIG 111 03/06/2024    TRIG 140 07/23/2020    CHOLHDLRATIO 4.0 08/03/2015       LIVER ENZYME RESULTS:  Lab Results   Component Value Date    AST 16 03/06/2024    AST 15 06/08/2021    ALT 11 03/06/2024    ALT 18 06/08/2021       CBC RESULTS:  Lab Results   Component Value Date    WBC 8.9 11/21/2024    WBC 6.6 05/17/2019    RBC 3.95 11/21/2024    RBC 4.43 05/17/2019    HGB 11.5 (L) 01/06/2025    HGB 13.0 07/23/2020    HCT 32.5 (L) 11/21/2024    HCT 39.4 05/17/2019    MCV 82 11/21/2024    MCV 89 05/17/2019    MCH 25.3 (L) 11/21/2024    MCH 30.2 05/17/2019    MCHC 30.8 (L) 11/21/2024    MCHC 34.0 05/17/2019    RDW 15.6 (H) " 11/21/2024    RDW 13.2 05/17/2019     11/21/2024     05/17/2019       BMP RESULTS:  Lab Results   Component Value Date     12/02/2024     06/08/2021    POTASSIUM 4.2 12/02/2024    POTASSIUM 4.1 05/28/2022    POTASSIUM 4.1 06/08/2021    CHLORIDE 107 12/02/2024    CHLORIDE 109 05/28/2022    CHLORIDE 103 06/08/2021    CO2 24 12/02/2024    CO2 25 05/28/2022    CO2 26 06/08/2021    ANIONGAP 9 12/02/2024    ANIONGAP 5 05/28/2022    ANIONGAP 6 06/08/2021     (H) 12/02/2024     (H) 05/28/2022     (H) 06/08/2021    BUN 18.4 12/02/2024    BUN 13 05/28/2022    BUN 17 06/08/2021    CR 1.10 (H) 12/02/2024    CR 1.23 (H) 06/08/2021    GFRESTIMATED 50 (L) 12/02/2024    GFRESTIMATED 42 (L) 06/08/2021    GFRESTBLACK 48 (L) 06/08/2021    JORDAN 9.0 12/02/2024    JORDAN 8.9 06/08/2021        A1C RESULTS:  Lab Results   Component Value Date    A1C 7.1 (H) 12/02/2024    A1C 6.7 (H) 06/08/2021       INR RESULTS:  Lab Results   Component Value Date    INR 0.97 04/16/2009           CC  Stacy Cheek MD  [id number]  [place] [id number]

## 2025-07-14 NOTE — LETTER
7/14/2025    Jose De Jesus Snow MD  [address] [facility] [address]    RE: Cristela Salgado       Dear Colleague,     I had the pleasure of seeing Cristela Salgado in the [facility].          ~Cardiology Clinic Visit~    Referring Cardiologist: Dr. Cheek  Reason for visit: cardiology follow up      Cristela Salgado MRN# [id number]   YOB: 1940 Age: 84 year old       HPI:    Cristela Salgado is a delightful 84 year old patient with past medical history significant for:    Moderate aortic stenosis   Asthma   Hyperlipidemia  Chronic kidney disease stage IIIa  Anemia    I had the opportunity to see Cristela Salgado for cardiology follow up.  In review, the patient was evaluated in our valve clinic for aortic stenosis by Dr. Cheek in 11/2024.  She was most recently seen by my colleague Charu Grewal CNP on 12/27/2024 and reported worsening dyspnea on exertion with minimal activity.  An echocardiogram from 11/2024 reported progression of her aortic stenosis to the severe range with dimensionless index of 0.31.  A transesophageal for better assessment was recommended.    Transesophageal echocardiogram was performed on 1/9/2025 and findings more consistent with moderate aortic stenosis with mean valve gradient 21 mL,  Vmax 2.1 m/s, DI 0.35 60%.     Following this the patient reported improvement in dyspnea and fatigue with iron supplementation and increased hemoglobin level.    A repeat echocardiogram was completed on 7/10/2025 and findings consistent with moderate aortic stenosis.  Mean valve gradient 33 mmHg, CHANG 1.2 cm , DI 0.36, and LVEF 65-70%.    Ms. Salgado returns for 6 month cardiology follow up and is accompanied by her Zander demarco. Overall, she reports feeling well from a cardiovascular standpoint. She does note a right upper chest/shoulder pain after waking last week. This lasted about an hour and resolved with aspirin. No recurrence. Otherwise no chest pain, pressure, or  tightness. No shortness of breath or dyspnea on exertion. No lower extremity swelling, orthopnea, or PND. No lightheadedness, dizziness, or syncope. No palpitations or racing heart. No blood in urine or stool. Going to [facility] for [facility] stenosis, may have upcoming spinal procedure.     Diagnotic studies:  Echocardiogram 7/10/2025:  Left ventricular systolic function is normal.  The visual ejection fraction is 65-70%.  The right ventricle is normal in structure, function and size.  Moderate valvular aortic stenosis.  mean 33mmHg, calc CHANG 1.2cm2  In comparison to previous study, AV gradients have increased      Assessment:  Moderate aortic stenosis, asymptomatic   Echocardiogram 7/10/2025: mean aortic valve gradient 33 mmHg, calc CHANG 1.2 cm2  Asthma managed with inhalers, stable  Hyperlipidemia on rosuvastatin and ezetimibe  Chronic kidney disease stage IIIa  Anemia on iron supplementation. Most recent Hgb 11.5 (1/2025)      Plan:   It was my pleasure to see Ms. Salgado for cardiology follow up today. Overall, she reports feeling well from a cardiac standpoint and has no exertional symptoms. Additionally, she has had no recurrence of her prior dyspnea or fatigue that was likely related to anemia. We reviewed the results of her recent echocardiogram which does show increased aortic valve gradients however remains in the moderate range. Plan to repeat echocardiogram in 6 months.   Single episode of upper right chest/shoulder discomfort that was likely musculoskeletal. No exertional symptoms. For now, monitor for any recurrence.   We reviewed symptoms of aortic stenosis and to keep us updated if any new symptoms.  Follow up with me in 6 months after echocardiogram, sooner if any new concerns.     Thank you for the opportunity to participate in this pleasant patient's care.    We would be happy to see this patient sooner for any concerns in the meantime.    JESSE Dunn, CNP   Nurse Practitioner  [facility]  - Heart Care      Today's clinic visit entailed:  A total of 40 minutes was spent with patient, on chart review and documentation today.         Orders Placed This Encounter   Procedures     Follow-Up with Cardiology MADY     Echocardiogram Complete     No orders of the defined types were placed in this encounter.    There are no discontinued medications.      Encounter Diagnosis   Name Primary?     Nonrheumatic aortic valve stenosis        CURRENT MEDICATIONS:  Current Outpatient Medications   Medication Sig Dispense Refill     albuterol (PROAIR HFA/PROVENTIL HFA/VENTOLIN HFA) 108 (90 Base) MCG/ACT inhaler Inhale 2 puffs into the lungs every 6 hours as needed for shortness of breath, wheezing or cough. 18 g 11     aspirin 81 MG tablet Take 1 tablet by mouth daily.       Cyanocobalamin (B-12) 1000 MCG SUBL Place 1 tablet under the tongue daily.       diltiazem ER COATED BEADS (CARDIZEM CD) 360 MG 24 hr capsule TAKE 1 CAPSULE BY MOUTH EVERY DAY 90 capsule 1     Elemental iron 65 mg Vitamin C 125 mg (VITRON C)  MG TABS tablet Take 1 tablet by mouth daily.       ezetimibe (ZETIA) 10 MG tablet Take 1 tablet (10 mg) by mouth daily 90 tablet 3     hydrALAZINE (APRESOLINE) 50 MG tablet TAKE 1 TABLET BY MOUTH 3 TIMES DAILY 270 tablet 1     labetalol (NORMODYNE) 100 MG tablet TAKE 1 TABLET BY MOUTH TWICE A  tablet 1     losartan (COZAAR) 100 MG tablet TAKE 1 TABLET BY MOUTH EVERY DAY 90 tablet 1     omeprazole (PRILOSEC) 40 MG DR capsule Take 1 capsule (40 mg) by mouth daily 90 capsule 3     rosuvastatin (CRESTOR) 40 MG tablet TAKE 1 TABLET BY MOUTH EVERY DAY 90 tablet 1     vitamin D3 (CHOLECALCIFEROL) 50 mcg (2000 units) tablet Take 1 tablet (50 mcg) by mouth daily         ALLERGIES     Allergies   Allergen Reactions     Lisinopril Cough     Hydrochlorothiazide      Renal insufficiency     Iodine Hives     Iodine contrast     Metformin      Loose stools     Niacin      LEG CRAMPS     Norvasc [Amlodipine  Besylate] Swelling     Simvastatin      ACHINESS         PAST MEDICAL HISTORY:  Past Medical History:   Diagnosis Date     CAD (coronary artery disease)      mild on CT angio     CKD (chronic kidney disease) stage 3, GFR 30-59 ml/min (H)      Colon polyps 2009     DDD (degenerative disc disease), lumbosacral 7/8/2022     Esophageal reflux 4/30/2008     Family history of ischemic heart disease      Fatigue 4/11/2013     Hiatal hernia 2009     History of blood transfusion 4/08    after hiatal hernia surgery     Hyperlipidemia LDL goal <70      Infection due to 2019 novel coronavirus 11/14/2022     Macular degeneration 1/3/2012     Mild persistent asthma      Nonrheumatic aortic valve stenosis 11/14/2022     Pulmonary hypertension (H) 8/20/2012    mild     Scoliosis     mild      Type 2 diabetes mellitus with diabetic chronic kidney disease  (goal A1C<8) 10/20/2015     Ulcer of the stomach and intestine 2009    Anthony's ulcer     Unspecified essential hypertension      Vitamin D deficiency 1/3/2012       PAST SURGICAL HISTORY:  Past Surgical History:   Procedure Laterality Date     ABDOMEN SURGERY  4/08    hiatal hernia     BREAST SURGERY  1974    biopsy-begign     CARDIAC SURGERY  1995    angiogram     COLONOSCOPY  2008     HERNIA REPAIR  2008     HYSTERECTOMY, IDRIS  1974    Fibroids     ZZC OPEN STOMACH,REPR ULCER,SUTURE  2009    Hiatal hernia, and Anthony's ulcer       FAMILY HISTORY:  Family History   Problem Relation Age of Onset     Heart Disease Mother         Rheumatic fever     Heart Disease Father         CAD, DM, PVD     C.A.D. Father         1st MI in 80s     Hypertension Sister         3 sisters, all with hypertension     Hypertension Sister      Dementia Sister      Family History Negative Son      Family History Negative Daughter      Family History Negative Daughter      C.A.D. Maternal Uncle         age 54 yrs, sudden cardiac     C.A.D. Maternal Uncle         age 55 yrs       SOCIAL HISTORY:  Social  History     Socioeconomic History     Marital status:      Number of children: 3   Occupational History     Employer: RETIRED   Tobacco Use     Smoking status: Never     Smokeless tobacco: Never   Vaping Use     Vaping status: Never Used   Substance and Sexual Activity     Alcohol use: Yes     Comment: 1-2 drinks week     Drug use: No     Sexual activity: Yes     Partners: Male   Other Topics Concern     Parent/sibling w/ CABG, MI or angioplasty before 65F 55M? No     Caffeine Concern No     Comment: 1-2 cups daily     Sleep Concern No     Comment: nocturia     Special Diet Yes     Comment: low salt, limits sugar     Exercise No     Comment: walking, yardwork, limited due to plantar fascitis     Social Drivers of Health     Financial Resource Strain: Low Risk  (3/6/2024)    Financial Resource Strain      Within the past 12 months, have you or your family members you live with been unable to get utilities (heat, electricity) when it was really needed?: No   Food Insecurity: Low Risk  (3/6/2024)    Food Insecurity      Within the past 12 months, did you worry that your food would run out before you got money to buy more?: No      Within the past 12 months, did the food you bought just not last and you didn t have money to get more?: No   Transportation Needs: Low Risk  (3/6/2024)    Transportation Needs      Within the past 12 months, has lack of transportation kept you from medical appointments, getting your medicines, non-medical meetings or appointments, work, or from getting things that you need?: No   Physical Activity: Unknown (3/6/2024)    Exercise Vital Sign      Days of Exercise per Week: 3 days   Stress: No Stress Concern Present (3/6/2024)    [employer] - Occupational Stress Questionnaire      Feeling of Stress : Not at all   Social Connections: Unknown (3/6/2024)    Social Connection and Isolation Panel [NHANES]      Frequency of Social Gatherings with Friends and  "Family: Once a week   Interpersonal Safety: Low Risk  (1/9/2025)    Interpersonal Safety      Do you feel physically and emotionally safe where you currently live?: Yes      Within the past 12 months, have you been hit, slapped, kicked or otherwise physically hurt by someone?: No      Within the past 12 months, have you been humiliated or emotionally abused in other ways by your partner or ex-partner?: No   Housing Stability: Low Risk  (3/6/2024)    Housing Stability      Do you have housing? : Yes      Are you worried about losing your housing?: No       Review of Systems:  Skin:        Eyes:       ENT:       Respiratory:       Cardiovascular:       Gastroenterology:      Genitourinary:       Musculoskeletal:       Neurologic:       Psychiatric:       Heme/Lymph/Imm:       Endocrine:         Physical Exam:    Vitals: /66   Pulse 60   Ht 1.575 m (5' 2\")   Wt 55.3 kg (122 lb)   SpO2 98%   BMI 22.31 kg/m    Constitutional: Well nourished and in no apparent distress.  Neck: Supple.  Respiratory: Breathing non-labored. Lungs clear to auscultation bilaterally. No crackles, wheezes, rhonchi, or rales.  Cardiovascular:  Regular rate and rhythm, normal S1 and S2. 2+ systolic murmur. No rub or gallop.  Skin: Warm, dry.   Extremities: No edema.  Neurologic: No gross motor deficits. Alert, awake, and oriented to person, place and time.  Psychiatric: Affect appropriate.        Recent Lab Results:  LIPID RESULTS:  Lab Results   Component Value Date    CHOL 186 03/06/2024    CHOL 182 07/23/2020    HDL 74 03/06/2024    HDL 63 07/23/2020    LDL 90 03/06/2024    LDL 91 07/23/2020    TRIG 111 03/06/2024    TRIG 140 07/23/2020    CHOLHDLRATIO 4.0 08/03/2015       LIVER ENZYME RESULTS:  Lab Results   Component Value Date    AST 16 03/06/2024    AST 15 06/08/2021    ALT 11 03/06/2024    ALT 18 06/08/2021       CBC RESULTS:  Lab Results   Component Value Date    WBC 8.9 11/21/2024    WBC 6.6 05/17/2019    RBC 3.95 11/21/2024 "    RBC 4.43 05/17/2019    HGB 11.5 (L) 01/06/2025    HGB 13.0 07/23/2020    HCT 32.5 (L) 11/21/2024    HCT 39.4 05/17/2019    MCV 82 11/21/2024    MCV 89 05/17/2019    MCH 25.3 (L) 11/21/2024    MCH 30.2 05/17/2019    MCHC 30.8 (L) 11/21/2024    MCHC 34.0 05/17/2019    RDW 15.6 (H) 11/21/2024    RDW 13.2 05/17/2019     11/21/2024     05/17/2019       BMP RESULTS:  Lab Results   Component Value Date     12/02/2024     06/08/2021    POTASSIUM 4.2 12/02/2024    POTASSIUM 4.1 05/28/2022    POTASSIUM 4.1 06/08/2021    CHLORIDE 107 12/02/2024    CHLORIDE 109 05/28/2022    CHLORIDE 103 06/08/2021    CO2 24 12/02/2024    CO2 25 05/28/2022    CO2 26 06/08/2021    ANIONGAP 9 12/02/2024    ANIONGAP 5 05/28/2022    ANIONGAP 6 06/08/2021     (H) 12/02/2024     (H) 05/28/2022     (H) 06/08/2021    BUN 18.4 12/02/2024    BUN 13 05/28/2022    BUN 17 06/08/2021    CR 1.10 (H) 12/02/2024    CR 1.23 (H) 06/08/2021    GFRESTIMATED 50 (L) 12/02/2024    GFRESTIMATED 42 (L) 06/08/2021    GFRESTBLACK 48 (L) 06/08/2021    JORDAN 9.0 12/02/2024    JORDAN 8.9 06/08/2021        A1C RESULTS:  Lab Results   Component Value Date    A1C 7.1 (H) 12/02/2024    A1C 6.7 (H) 06/08/2021       INR RESULTS:  Lab Results   Component Value Date    INR 0.97 04/16/2009           CC  Stacy Cheek MD  [address]  [place] [id number]                  Thank you for allowing me to participate in the care of your patient.      Sincerely,     JESSE Dunn [facility]  cc:   Stacy Cheek MD  [address]  [place] [id number]

## 2025-07-14 NOTE — PATIENT INSTRUCTIONS
Thank you for your visit with the [facility] today.    Today's Summary:    Your aortic valve looks stable and continues to be in the moderate stenosis range. For now, we'll continue to keep a close eye and repeat an echocardiogram in 6 months  Symptoms to monitor for with aortic stenosis: worsening shortness of breath with exertion, fatigue, decreased exercise tolerance, or new lightheadedness or fainting.   No changes to medications today.    Follow-up:  Cardiology follow up at [facility]: 6 months with me.     Cardiology Scheduling ~[phone]  Cardiology Clinic RN ~ [phone]    It was a pleasure seeing you today.     JESSE Dunn, CNP  Certified Nurse Practitioner  [facility]  July 14, 2025  ________________________________________________________

## 2025-07-23 ENCOUNTER — PATIENT OUTREACH (OUTPATIENT)
Dept: CARE COORDINATION | Facility: CLINIC | Age: 85
End: 2025-07-23
Payer: COMMERCIAL

## (undated) RX ORDER — LIDOCAINE HYDROCHLORIDE 10 MG/ML
INJECTION, SOLUTION EPIDURAL; INFILTRATION; INTRACAUDAL; PERINEURAL
Status: DISPENSED
Start: 2025-01-09

## (undated) RX ORDER — LIDOCAINE HYDROCHLORIDE 40 MG/ML
SOLUTION TOPICAL
Status: DISPENSED
Start: 2025-01-09

## (undated) RX ORDER — AMINOPHYLLINE 25 MG/ML
INJECTION, SOLUTION INTRAVENOUS
Status: DISPENSED
Start: 2023-01-12

## (undated) RX ORDER — GLYCOPYRROLATE 0.2 MG/ML
INJECTION, SOLUTION INTRAMUSCULAR; INTRAVENOUS
Status: DISPENSED
Start: 2025-01-09

## (undated) RX ORDER — FENTANYL CITRATE 50 UG/ML
INJECTION, SOLUTION INTRAMUSCULAR; INTRAVENOUS
Status: DISPENSED
Start: 2025-01-09

## (undated) RX ORDER — FLUMAZENIL 0.1 MG/ML
INJECTION, SOLUTION INTRAVENOUS
Status: DISPENSED
Start: 2025-01-09

## (undated) RX ORDER — NALOXONE HYDROCHLORIDE 0.4 MG/ML
INJECTION, SOLUTION INTRAMUSCULAR; INTRAVENOUS; SUBCUTANEOUS
Status: DISPENSED
Start: 2025-01-09

## (undated) RX ORDER — REGADENOSON 0.08 MG/ML
INJECTION, SOLUTION INTRAVENOUS
Status: DISPENSED
Start: 2023-01-12